# Patient Record
Sex: FEMALE | Race: WHITE | Employment: OTHER | URBAN - METROPOLITAN AREA
[De-identification: names, ages, dates, MRNs, and addresses within clinical notes are randomized per-mention and may not be internally consistent; named-entity substitution may affect disease eponyms.]

---

## 2017-02-22 DIAGNOSIS — K21.9 GASTROESOPHAGEAL REFLUX DISEASE WITHOUT ESOPHAGITIS: Chronic | ICD-10-CM

## 2017-02-23 RX ORDER — PANTOPRAZOLE SODIUM 40 MG/1
40 TABLET, DELAYED RELEASE ORAL DAILY
Qty: 90 TABLET | Refills: 1 | Status: SHIPPED | OUTPATIENT
Start: 2017-02-23 | End: 2017-06-15 | Stop reason: SDUPTHER

## 2017-06-05 ENCOUNTER — OFFICE VISIT (OUTPATIENT)
Dept: SLEEP MEDICINE | Age: 82
End: 2017-06-05

## 2017-06-05 VITALS
OXYGEN SATURATION: 97 % | BODY MASS INDEX: 35.82 KG/M2 | DIASTOLIC BLOOD PRESSURE: 90 MMHG | SYSTOLIC BLOOD PRESSURE: 150 MMHG | HEIGHT: 65 IN | HEART RATE: 97 BPM | WEIGHT: 215 LBS

## 2017-06-05 DIAGNOSIS — G47.33 OBSTRUCTIVE SLEEP APNEA (ADULT) (PEDIATRIC): Primary | Chronic | ICD-10-CM

## 2017-06-05 DIAGNOSIS — E66.9 NON MORBID OBESITY, UNSPECIFIED OBESITY TYPE: Chronic | ICD-10-CM

## 2017-06-05 DIAGNOSIS — I10 HYPERTENSION, ESSENTIAL: Chronic | ICD-10-CM

## 2017-06-05 DIAGNOSIS — K21.9 GASTROESOPHAGEAL REFLUX DISEASE WITHOUT ESOPHAGITIS: Chronic | ICD-10-CM

## 2017-06-05 PROCEDURE — 99214 OFFICE O/P EST MOD 30 MIN: CPT | Performed by: NURSE PRACTITIONER

## 2017-06-05 RX ORDER — MODAFINIL 200 MG/1
200 TABLET ORAL EVERY MORNING
Qty: 90 TABLET | Refills: 1 | Status: SHIPPED | OUTPATIENT
Start: 2017-06-05 | End: 2018-12-04 | Stop reason: SDUPTHER

## 2017-06-05 ASSESSMENT — ENCOUNTER SYMPTOMS
RHINORRHEA: 0
APNEA: 0
SHORTNESS OF BREATH: 0
COUGH: 0
ABDOMINAL PAIN: 0
ABDOMINAL DISTENTION: 0
SINUS PRESSURE: 0

## 2017-06-05 ASSESSMENT — SLEEP AND FATIGUE QUESTIONNAIRES
HOW LIKELY ARE YOU TO NOD OFF OR FALL ASLEEP WHILE SITTING AND READING: 1
HOW LIKELY ARE YOU TO NOD OFF OR FALL ASLEEP WHILE WATCHING TV: 1
HOW LIKELY ARE YOU TO NOD OFF OR FALL ASLEEP WHILE SITTING INACTIVE IN A PUBLIC PLACE: 0
HOW LIKELY ARE YOU TO NOD OFF OR FALL ASLEEP WHILE SITTING QUIETLY AFTER LUNCH WITHOUT ALCOHOL: 0
HOW LIKELY ARE YOU TO NOD OFF OR FALL ASLEEP WHILE LYING DOWN TO REST IN THE AFTERNOON WHEN CIRCUMSTANCES PERMIT: 0
HOW LIKELY ARE YOU TO NOD OFF OR FALL ASLEEP WHEN YOU ARE A PASSENGER IN A CAR FOR AN HOUR WITHOUT A BREAK: 0
ESS TOTAL SCORE: 2
HOW LIKELY ARE YOU TO NOD OFF OR FALL ASLEEP WHILE SITTING AND TALKING TO SOMEONE: 0
HOW LIKELY ARE YOU TO NOD OFF OR FALL ASLEEP IN A CAR, WHILE STOPPED FOR A FEW MINUTES IN TRAFFIC: 0

## 2017-06-15 ENCOUNTER — OFFICE VISIT (OUTPATIENT)
Dept: FAMILY MEDICINE CLINIC | Age: 82
End: 2017-06-15

## 2017-06-15 VITALS
BODY MASS INDEX: 36.44 KG/M2 | WEIGHT: 219 LBS | HEART RATE: 68 BPM | DIASTOLIC BLOOD PRESSURE: 78 MMHG | SYSTOLIC BLOOD PRESSURE: 128 MMHG

## 2017-06-15 DIAGNOSIS — E78.2 MIXED HYPERLIPIDEMIA: Chronic | ICD-10-CM

## 2017-06-15 DIAGNOSIS — G47.33 OBSTRUCTIVE SLEEP APNEA: Chronic | ICD-10-CM

## 2017-06-15 DIAGNOSIS — K21.9 GASTROESOPHAGEAL REFLUX DISEASE WITHOUT ESOPHAGITIS: Chronic | ICD-10-CM

## 2017-06-15 DIAGNOSIS — M19.90 ARTHRITIS: ICD-10-CM

## 2017-06-15 DIAGNOSIS — I10 ESSENTIAL HYPERTENSION: Primary | Chronic | ICD-10-CM

## 2017-06-15 PROCEDURE — 99214 OFFICE O/P EST MOD 30 MIN: CPT | Performed by: FAMILY MEDICINE

## 2017-06-15 RX ORDER — PRAVASTATIN SODIUM 20 MG
20 TABLET ORAL EVERY EVENING
Qty: 90 TABLET | Refills: 1 | Status: SHIPPED | OUTPATIENT
Start: 2017-06-15 | End: 2017-12-04 | Stop reason: SDUPTHER

## 2017-06-15 RX ORDER — CELECOXIB 200 MG/1
200 CAPSULE ORAL DAILY
Qty: 90 CAPSULE | Refills: 1 | Status: SHIPPED | OUTPATIENT
Start: 2017-06-15 | End: 2017-12-04 | Stop reason: SDUPTHER

## 2017-06-15 RX ORDER — PANTOPRAZOLE SODIUM 40 MG/1
40 TABLET, DELAYED RELEASE ORAL DAILY
Qty: 90 TABLET | Refills: 1 | Status: SHIPPED | OUTPATIENT
Start: 2017-06-15 | End: 2017-12-04 | Stop reason: SDUPTHER

## 2017-06-15 RX ORDER — LANOLIN ALCOHOL/MO/W.PET/CERES
1000 CREAM (GRAM) TOPICAL DAILY
COMMUNITY
End: 2019-12-09

## 2017-06-15 RX ORDER — AMLODIPINE BESYLATE AND BENAZEPRIL HYDROCHLORIDE 5; 40 MG/1; MG/1
1 CAPSULE ORAL DAILY
Qty: 90 CAPSULE | Refills: 1 | Status: SHIPPED | OUTPATIENT
Start: 2017-06-15 | End: 2017-12-04 | Stop reason: SDUPTHER

## 2017-07-18 ENCOUNTER — OFFICE VISIT (OUTPATIENT)
Dept: FAMILY MEDICINE CLINIC | Age: 82
End: 2017-07-18

## 2017-07-18 VITALS
SYSTOLIC BLOOD PRESSURE: 128 MMHG | WEIGHT: 223 LBS | HEART RATE: 76 BPM | DIASTOLIC BLOOD PRESSURE: 70 MMHG | BODY MASS INDEX: 37.11 KG/M2

## 2017-07-18 DIAGNOSIS — R30.0 DYSURIA: Primary | ICD-10-CM

## 2017-07-18 DIAGNOSIS — M25.562 ACUTE PAIN OF LEFT KNEE: ICD-10-CM

## 2017-07-18 DIAGNOSIS — M54.6 CHRONIC LEFT-SIDED THORACIC BACK PAIN: ICD-10-CM

## 2017-07-18 DIAGNOSIS — G89.29 CHRONIC LEFT-SIDED THORACIC BACK PAIN: ICD-10-CM

## 2017-07-18 LAB
BILIRUBIN, POC: NORMAL
BLOOD URINE, POC: NORMAL
CLARITY, POC: CLEAR
COLOR, POC: YELLOW
GLUCOSE URINE, POC: NORMAL
KETONES, POC: NORMAL
LEUKOCYTE EST, POC: NORMAL
NITRITE, POC: NORMAL
PH, POC: 5.5
PROTEIN, POC: NORMAL
SPECIFIC GRAVITY, POC: 1.02
UROBILINOGEN, POC: 0.2

## 2017-07-18 PROCEDURE — 81002 URINALYSIS NONAUTO W/O SCOPE: CPT | Performed by: FAMILY MEDICINE

## 2017-07-18 PROCEDURE — 99214 OFFICE O/P EST MOD 30 MIN: CPT | Performed by: FAMILY MEDICINE

## 2017-07-18 RX ORDER — METAXALONE 800 MG/1
800 TABLET ORAL 3 TIMES DAILY
Qty: 30 TABLET | Refills: 0 | Status: SHIPPED | OUTPATIENT
Start: 2017-07-18 | End: 2017-07-28

## 2017-07-18 RX ORDER — CHOLECALCIFEROL (VITAMIN D3) 1250 MCG
CAPSULE ORAL
COMMUNITY
End: 2020-06-08

## 2017-07-18 RX ORDER — DULOXETIN HYDROCHLORIDE 60 MG/1
60 CAPSULE, DELAYED RELEASE ORAL DAILY
Qty: 90 CAPSULE | Refills: 1 | Status: SHIPPED | OUTPATIENT
Start: 2017-07-18 | End: 2017-12-04 | Stop reason: SDUPTHER

## 2017-07-18 RX ORDER — SULFAMETHOXAZOLE AND TRIMETHOPRIM 800; 160 MG/1; MG/1
1 TABLET ORAL 2 TIMES DAILY
Qty: 14 TABLET | Refills: 0 | Status: SHIPPED | OUTPATIENT
Start: 2017-07-18 | End: 2017-07-25

## 2017-07-18 RX ORDER — MELOXICAM 15 MG/1
15 TABLET ORAL DAILY
Qty: 30 TABLET | Refills: 3 | Status: SHIPPED | OUTPATIENT
Start: 2017-07-18 | End: 2020-04-21 | Stop reason: ALTCHOICE

## 2017-07-21 LAB
ORGANISM: ABNORMAL
URINE CULTURE, ROUTINE: ABNORMAL

## 2017-07-21 RX ORDER — NITROFURANTOIN 25; 75 MG/1; MG/1
100 CAPSULE ORAL 2 TIMES DAILY
Qty: 14 CAPSULE | Refills: 0 | Status: SHIPPED | OUTPATIENT
Start: 2017-07-21 | End: 2017-07-28

## 2017-07-23 ASSESSMENT — ENCOUNTER SYMPTOMS
BACK PAIN: 1
EYES NEGATIVE: 1
RESPIRATORY NEGATIVE: 1

## 2017-12-04 ENCOUNTER — OFFICE VISIT (OUTPATIENT)
Dept: FAMILY MEDICINE CLINIC | Age: 82
End: 2017-12-04

## 2017-12-04 VITALS
SYSTOLIC BLOOD PRESSURE: 122 MMHG | DIASTOLIC BLOOD PRESSURE: 80 MMHG | BODY MASS INDEX: 37.28 KG/M2 | WEIGHT: 224 LBS | HEART RATE: 62 BPM

## 2017-12-04 DIAGNOSIS — M19.90 ARTHRITIS: ICD-10-CM

## 2017-12-04 DIAGNOSIS — R53.83 FATIGUE, UNSPECIFIED TYPE: ICD-10-CM

## 2017-12-04 DIAGNOSIS — M25.512 CHRONIC LEFT SHOULDER PAIN: ICD-10-CM

## 2017-12-04 DIAGNOSIS — I10 HYPERTENSION, UNSPECIFIED TYPE: ICD-10-CM

## 2017-12-04 DIAGNOSIS — G89.29 CHRONIC BILATERAL LOW BACK PAIN WITHOUT SCIATICA: ICD-10-CM

## 2017-12-04 DIAGNOSIS — R53.83 FATIGUE, UNSPECIFIED TYPE: Primary | ICD-10-CM

## 2017-12-04 DIAGNOSIS — M54.50 CHRONIC BILATERAL LOW BACK PAIN WITHOUT SCIATICA: ICD-10-CM

## 2017-12-04 DIAGNOSIS — G89.29 CHRONIC LEFT SHOULDER PAIN: ICD-10-CM

## 2017-12-04 DIAGNOSIS — K21.9 GASTROESOPHAGEAL REFLUX DISEASE WITHOUT ESOPHAGITIS: Chronic | ICD-10-CM

## 2017-12-04 LAB
ALBUMIN SERPL-MCNC: 4.4 G/DL (ref 3.4–5)
ALP BLD-CCNC: 62 U/L (ref 40–129)
ALT SERPL-CCNC: 22 U/L (ref 10–40)
ANION GAP SERPL CALCULATED.3IONS-SCNC: 15 MMOL/L (ref 3–16)
AST SERPL-CCNC: 22 U/L (ref 15–37)
BILIRUB SERPL-MCNC: 0.6 MG/DL (ref 0–1)
BILIRUBIN DIRECT: <0.2 MG/DL (ref 0–0.3)
BILIRUBIN, INDIRECT: NORMAL MG/DL (ref 0–1)
BUN BLDV-MCNC: 16 MG/DL (ref 7–20)
CALCIUM SERPL-MCNC: 9.7 MG/DL (ref 8.3–10.6)
CHLORIDE BLD-SCNC: 102 MMOL/L (ref 99–110)
CHOLESTEROL, TOTAL: 159 MG/DL (ref 0–199)
CO2: 26 MMOL/L (ref 21–32)
CREAT SERPL-MCNC: 0.7 MG/DL (ref 0.6–1.2)
GFR AFRICAN AMERICAN: >60
GFR NON-AFRICAN AMERICAN: >60
GLUCOSE BLD-MCNC: 118 MG/DL (ref 70–99)
HCT VFR BLD CALC: 41.6 % (ref 36–48)
HDLC SERPL-MCNC: 54 MG/DL (ref 40–60)
HEMOGLOBIN: 13.7 G/DL (ref 12–16)
LDL CHOLESTEROL CALCULATED: 75 MG/DL
MCH RBC QN AUTO: 31.6 PG (ref 26–34)
MCHC RBC AUTO-ENTMCNC: 32.9 G/DL (ref 31–36)
MCV RBC AUTO: 96 FL (ref 80–100)
PDW BLD-RTO: 14.8 % (ref 12.4–15.4)
PLATELET # BLD: 218 K/UL (ref 135–450)
PMV BLD AUTO: 9.3 FL (ref 5–10.5)
POTASSIUM SERPL-SCNC: 5.3 MMOL/L (ref 3.5–5.1)
RBC # BLD: 4.33 M/UL (ref 4–5.2)
SODIUM BLD-SCNC: 143 MMOL/L (ref 136–145)
TOTAL PROTEIN: 6.5 G/DL (ref 6.4–8.2)
TRIGL SERPL-MCNC: 149 MG/DL (ref 0–150)
TSH SERPL DL<=0.05 MIU/L-ACNC: 1.94 UIU/ML (ref 0.27–4.2)
VLDLC SERPL CALC-MCNC: 30 MG/DL
WBC # BLD: 6.4 K/UL (ref 4–11)

## 2017-12-04 PROCEDURE — G0008 ADMIN INFLUENZA VIRUS VAC: HCPCS | Performed by: FAMILY MEDICINE

## 2017-12-04 PROCEDURE — 99214 OFFICE O/P EST MOD 30 MIN: CPT | Performed by: FAMILY MEDICINE

## 2017-12-04 PROCEDURE — 90662 IIV NO PRSV INCREASED AG IM: CPT | Performed by: FAMILY MEDICINE

## 2017-12-04 RX ORDER — PANTOPRAZOLE SODIUM 40 MG/1
40 TABLET, DELAYED RELEASE ORAL DAILY
Qty: 90 TABLET | Refills: 1 | Status: SHIPPED | OUTPATIENT
Start: 2017-12-04 | End: 2018-07-19 | Stop reason: SDUPTHER

## 2017-12-04 RX ORDER — MODAFINIL 200 MG/1
TABLET ORAL
COMMUNITY
Start: 2017-11-14 | End: 2018-12-04 | Stop reason: CLARIF

## 2017-12-04 RX ORDER — PRAVASTATIN SODIUM 20 MG
20 TABLET ORAL EVERY EVENING
Qty: 90 TABLET | Refills: 1 | Status: SHIPPED | OUTPATIENT
Start: 2017-12-04 | End: 2018-07-20 | Stop reason: SDUPTHER

## 2017-12-04 RX ORDER — DULOXETIN HYDROCHLORIDE 60 MG/1
60 CAPSULE, DELAYED RELEASE ORAL DAILY
Qty: 90 CAPSULE | Refills: 1 | Status: SHIPPED | OUTPATIENT
Start: 2017-12-04 | End: 2018-07-19 | Stop reason: SDUPTHER

## 2017-12-04 RX ORDER — AMLODIPINE BESYLATE AND BENAZEPRIL HYDROCHLORIDE 5; 40 MG/1; MG/1
1 CAPSULE ORAL DAILY
Qty: 90 CAPSULE | Refills: 1 | Status: SHIPPED | OUTPATIENT
Start: 2017-12-04 | End: 2018-07-19 | Stop reason: SDUPTHER

## 2017-12-04 RX ORDER — CELECOXIB 200 MG/1
200 CAPSULE ORAL DAILY
Qty: 90 CAPSULE | Refills: 1 | Status: SHIPPED | OUTPATIENT
Start: 2017-12-04 | End: 2018-09-17 | Stop reason: SDUPTHER

## 2017-12-04 RX ORDER — INDAPAMIDE 1.25 MG/1
1.25 TABLET, FILM COATED ORAL EVERY MORNING
Qty: 30 TABLET | Refills: 2 | Status: SHIPPED | OUTPATIENT
Start: 2017-12-04 | End: 2018-01-23 | Stop reason: SDUPTHER

## 2017-12-04 RX ORDER — ERGOCALCIFEROL 1.25 MG/1
CAPSULE ORAL
COMMUNITY
Start: 2017-11-17 | End: 2020-06-08

## 2017-12-04 RX ORDER — HYDROCODONE BITARTRATE AND ACETAMINOPHEN 5; 325 MG/1; MG/1
TABLET ORAL
COMMUNITY
Start: 2017-11-29 | End: 2018-03-19

## 2017-12-04 NOTE — PROGRESS NOTES
Vaccine Information Sheet, \"Influenza - Inactivated\"  given to Be Wells, or parent/legal guardian of  Be Wells and verbalized understanding. Patient responses:    Have you ever had a reaction to a flu vaccine? No  Are you able to eat eggs without adverse effects? Yes  Do you have any current illness? No  Have you ever had [place] Syndrome? No    Flu vaccine given per order. Please see immunization tab.
malaise. Patient describes the following psychologic symptoms: none. Patient denies fever, significant change in weight, exercise intolerance, unusual rashes, cold intolerance, constipation and change in hair texture. and GI blood loss. Symptoms have stabilized. Severity has been mild. Previous visits for this problem: yes, last seen several months ago by me. Review of Systems   Constitutional:. Negative for fever, activity change and appetite change. HENT: Negative. Eyes: Negative. Respiratory: Negative. Cardiovascular: Negative. Gastrointestinal: Negative. Objective:   Physical Exam   Constitutional: She is oriented to person, place, and time. HENT:   Mouth/Throat: Oropharynx is clear and moist.   Eyes: Conjunctivae are normal. No scleral icterus. Neck: No JVD present. No thyromegaly present. Cardiovascular: Normal rate, regular rhythm, normal heart sounds and intact distal pulses. Exam reveals no gallop and no friction rub. No murmur heard. Pulmonary/Chest: Effort normal and breath sounds normal. She has no wheezes. She has no rales. Abdominal: Soft. Bowel sounds are normal. She exhibits no distension and no mass. There is no hepatosplenomegaly. There is no tenderness. No hernia. Musculoskeletal: She exhibits no edema. Lymphadenopathy:     She has no cervical adenopathy. Neurological: She is alert and oriented to person, place, and time. Skin: No rash noted. Psychiatric: She has a normal mood and affect. Vitals reviewed. Assessment:        1. Fatigue, unspecified type  TSH without Reflex    Basic Metabolic Panel    Hepatic Function Panel   2. Arthritis  amLODIPine-benazepril (LOTREL) 5-40 MG per capsule    celecoxib (CELEBREX) 200 MG capsule    CBC   3. Gastroesophageal reflux disease without esophagitis  pantoprazole (PROTONIX) 40 MG tablet   4. Hypertension, unspecified type  CBC    Lipid Panel   5.  Chronic left shoulder pain  External Referral To Physical

## 2017-12-05 ENCOUNTER — OFFICE VISIT (OUTPATIENT)
Dept: SLEEP MEDICINE | Age: 82
End: 2017-12-05

## 2017-12-05 VITALS
OXYGEN SATURATION: 93 % | BODY MASS INDEX: 37.32 KG/M2 | SYSTOLIC BLOOD PRESSURE: 138 MMHG | DIASTOLIC BLOOD PRESSURE: 79 MMHG | HEIGHT: 65 IN | HEART RATE: 76 BPM | WEIGHT: 224 LBS

## 2017-12-05 DIAGNOSIS — I10 HYPERTENSION, ESSENTIAL: Chronic | ICD-10-CM

## 2017-12-05 DIAGNOSIS — K21.9 GASTROESOPHAGEAL REFLUX DISEASE WITHOUT ESOPHAGITIS: Chronic | ICD-10-CM

## 2017-12-05 DIAGNOSIS — G47.33 OBSTRUCTIVE SLEEP APNEA SYNDROME: Primary | Chronic | ICD-10-CM

## 2017-12-05 DIAGNOSIS — E66.9 NON MORBID OBESITY, UNSPECIFIED OBESITY TYPE: Chronic | ICD-10-CM

## 2017-12-05 PROCEDURE — 99214 OFFICE O/P EST MOD 30 MIN: CPT | Performed by: NURSE PRACTITIONER

## 2017-12-05 RX ORDER — FLUTICASONE PROPIONATE 50 MCG
2 SPRAY, SUSPENSION (ML) NASAL DAILY
Qty: 3 BOTTLE | Refills: 3 | Status: SHIPPED | OUTPATIENT
Start: 2017-12-05 | End: 2019-08-12 | Stop reason: SDUPTHER

## 2017-12-05 ASSESSMENT — ENCOUNTER SYMPTOMS
ABDOMINAL DISTENTION: 0
SINUS PRESSURE: 0
APNEA: 0
COUGH: 0
SHORTNESS OF BREATH: 0
ABDOMINAL PAIN: 0
RHINORRHEA: 0

## 2017-12-05 ASSESSMENT — SLEEP AND FATIGUE QUESTIONNAIRES
HOW LIKELY ARE YOU TO NOD OFF OR FALL ASLEEP WHILE LYING DOWN TO REST IN THE AFTERNOON WHEN CIRCUMSTANCES PERMIT: 0
HOW LIKELY ARE YOU TO NOD OFF OR FALL ASLEEP WHEN YOU ARE A PASSENGER IN A CAR FOR AN HOUR WITHOUT A BREAK: 2
HOW LIKELY ARE YOU TO NOD OFF OR FALL ASLEEP IN A CAR, WHILE STOPPED FOR A FEW MINUTES IN TRAFFIC: 0
HOW LIKELY ARE YOU TO NOD OFF OR FALL ASLEEP WHILE SITTING AND READING: 1
HOW LIKELY ARE YOU TO NOD OFF OR FALL ASLEEP WHILE WATCHING TV: 1
HOW LIKELY ARE YOU TO NOD OFF OR FALL ASLEEP WHILE SITTING AND TALKING TO SOMEONE: 0
HOW LIKELY ARE YOU TO NOD OFF OR FALL ASLEEP WHILE SITTING INACTIVE IN A PUBLIC PLACE: 0
HOW LIKELY ARE YOU TO NOD OFF OR FALL ASLEEP WHILE SITTING QUIETLY AFTER LUNCH WITHOUT ALCOHOL: 1
ESS TOTAL SCORE: 5

## 2017-12-05 NOTE — PROGRESS NOTES
pantoprazole (PROTONIX) 40 MG tablet Take 1 tablet by mouth daily 12/4/17  Yes Archana Robin MD   pravastatin (PRAVACHOL) 20 MG tablet Take 1 tablet by mouth every evening 12/4/17  Yes Archana Robin MD   celecoxib (CELEBREX) 200 MG capsule Take 1 capsule by mouth daily 12/4/17  Yes Archana Robin MD   vitamin B-12 (CYANOCOBALAMIN) 1000 MCG tablet Take 1,000 mcg by mouth daily   Yes Historical Provider, MD   fluticasone (FLONASE) 50 MCG/ACT nasal spray 2 sprays by Nasal route daily 12/2/16  Yes Stacie Banerjee MD   meclizine (ANTIVERT) 25 MG tablet Take 25 mg by mouth 2 times daily. Yes Historical Provider, MD   vitamin E 1000 UNITS capsule Take 1,000 Units by mouth daily. Yes Historical Provider, MD   Methylcellulose, Laxative, (CITRUCEL) 500 MG TABS Take 1,000 mg by mouth daily. Yes Historical Provider, MD   Pyridoxine HCl (VITAMIN B-6) 100 MG tablet Take 100 mg by mouth daily. Yes Historical Provider, MD   HYDROcodone-acetaminophen [facility]) 5-325 MG per tablet  11/29/17   Historical Provider, MD   indapamide (LOZOL) 1.25 MG tablet Take 1 tablet by mouth every morning 12/4/17   Archana Robin MD   Cholecalciferol (VITAMIN D3) 05522 units CAPS Take by mouth    Historical Provider, MD   meloxicam (MOBIC) 15 MG tablet Take 1 tablet by mouth daily 7/18/17   Archana Robin MD   Ascorbic Acid (VITAMIN C) 500 MG tablet Take 500 mg by mouth daily.     Historical Provider, MD       Allergies as of 12/05/2017 - Review Complete 12/05/2017   Allergen Reaction Noted    Lodine [etodolac] Rash 07/07/2010       Patient Active Problem List   Diagnosis    Malignant neoplasm of breast ([address]    Hyperlipidemia    Hypertension    Hypercholesterolemia    Gastroesophageal reflux disease    Rectocele    Arthritis    Sleep apnea    Fatigue    Spinal stenosis of lumbar region    Numbness and tingling of both legs    GERD (gastroesophageal reflux disease)    Neuropathy (HCC)    B12 deficiency    Spinal stenosis    Elevated LFTs  Breast cancer in situ    Allergic rhinitis    Myxoma    Wound check, abscess    Depression screening    Obstructive sleep apnea       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 REMOVAL  1/08    bilateral    COLONOSCOPY  2005    30, R7X 7X 2005    CYSTOCELE REPAIR  1989    DILATION AND CURETTAGE OF UTERUS  yrs ago    DOPPLER ECHOCARDIOGRAPHY  2007    EYE SURGERY      bilat catarats    HYSTERECTOMY  4/1974    JOINT REPLACEMENT      left hip   [facility]    MOHS SURGERY  2/08    nose    OTHER SURGICAL HISTORY  12/22/06    epidural    [facility]    SUTURE REMOVAL  june 2004    sutures in 3 fingers    TONSILLECTOMY      TOTAL HIP ARTHROPLASTY  2006    L       Family History   Problem Relation Age of Onset    Cancer Brother      Prostate    Cancer Mother      Stomach Cancer at age 80    Stroke Father     Cancer Paternal Grandmother      intraadbominal at age 80    Cancer Other      Leukemia age 70       Vitals:  Weight BMI   Wt Readings from Last 3 Encounters:   12/05/17 224 lb (101.6 kg)   12/04/17 224 lb (101.6 kg)   07/18/17 223 lb (101.2 kg)    Body mass index is 37.28 kg/m².      BP HR SaO2   BP Readings from Last 3

## 2017-12-21 ENCOUNTER — TELEPHONE (OUTPATIENT)
Dept: FAMILY MEDICINE CLINIC | Age: 82
End: 2017-12-21

## 2017-12-21 NOTE — TELEPHONE ENCOUNTER
What does the note needs to say?, I have told the pt may need PT assessment so the insurance can cover some of the cost

## 2017-12-21 NOTE — TELEPHONE ENCOUNTER
Pt is calling about getting a stair lift. She says she is having them come out on 12/28/17 to install it. She needs a note from her doctor. See [employer] e-mail from 12/18/17.     Last office visit 12/5/17

## 2017-12-22 NOTE — TELEPHONE ENCOUNTER
Left patient a message informing her of the prescription being completed. Just need to find out if she wants us to fax it or if she is coming in the office and picking it up.

## 2018-01-23 ENCOUNTER — OFFICE VISIT (OUTPATIENT)
Dept: FAMILY MEDICINE CLINIC | Age: 83
End: 2018-01-23

## 2018-01-23 VITALS
RESPIRATION RATE: 16 BRPM | HEIGHT: 65 IN | BODY MASS INDEX: 37.72 KG/M2 | OXYGEN SATURATION: 96 % | SYSTOLIC BLOOD PRESSURE: 154 MMHG | HEART RATE: 73 BPM | DIASTOLIC BLOOD PRESSURE: 80 MMHG | WEIGHT: 226.4 LBS

## 2018-01-23 DIAGNOSIS — R82.998 LEUKOCYTES IN URINE: ICD-10-CM

## 2018-01-23 DIAGNOSIS — R53.83 FATIGUE, UNSPECIFIED TYPE: ICD-10-CM

## 2018-01-23 DIAGNOSIS — I10 ESSENTIAL HYPERTENSION: Primary | Chronic | ICD-10-CM

## 2018-01-23 DIAGNOSIS — R06.02 SOB (SHORTNESS OF BREATH): ICD-10-CM

## 2018-01-23 LAB
BILIRUBIN, POC: ABNORMAL
BLOOD URINE, POC: ABNORMAL
CLARITY, POC: CLEAR
COLOR, POC: YELLOW
GLUCOSE URINE, POC: ABNORMAL
KETONES, POC: ABNORMAL
LEUKOCYTE EST, POC: ABNORMAL
NITRITE, POC: ABNORMAL
PH, POC: 6.5
PROTEIN, POC: ABNORMAL
SPECIFIC GRAVITY, POC: 1.01
UROBILINOGEN, POC: 0.2

## 2018-01-23 PROCEDURE — 81002 URINALYSIS NONAUTO W/O SCOPE: CPT | Performed by: FAMILY MEDICINE

## 2018-01-23 PROCEDURE — 99214 OFFICE O/P EST MOD 30 MIN: CPT | Performed by: FAMILY MEDICINE

## 2018-01-23 RX ORDER — ERGOCALCIFEROL (VITAMIN D2) 1250 MCG
50000 CAPSULE ORAL
COMMUNITY
Start: 2018-01-08 | End: 2021-05-03 | Stop reason: SDUPTHER

## 2018-01-23 RX ORDER — INDAPAMIDE 1.25 MG/1
1.25 TABLET, FILM COATED ORAL EVERY MORNING
Qty: 90 TABLET | Refills: 1 | Status: SHIPPED | OUTPATIENT
Start: 2018-01-23 | End: 2018-07-19 | Stop reason: SDUPTHER

## 2018-01-23 ASSESSMENT — PATIENT HEALTH QUESTIONNAIRE - PHQ9
SUM OF ALL RESPONSES TO PHQ9 QUESTIONS 1 & 2: 0
1. LITTLE INTEREST OR PLEASURE IN DOING THINGS: 0
SUM OF ALL RESPONSES TO PHQ QUESTIONS 1-9: 0
2. FEELING DOWN, DEPRESSED OR HOPELESS: 0

## 2018-01-23 NOTE — PROGRESS NOTES
intraadbominal at age 80    Cancer Other      Leukemia age 70     Social History     Social History    Marital status:      Spouse name: Andrew Mendez Number of children: 2    Years of education: N/A     Social History Main Topics    Smoking status: Never Smoker    Smokeless tobacco: Never Used    Alcohol use Yes      Comment: very rare occasions    Drug use: No    Sexual activity: Yes     Partners: Male     Other Topics Concern    None     Social History Narrative    None     Current Outpatient Prescriptions   Medication Sig Dispense Refill    ergocalciferol (ERGOCALCIFEROL) 05455 units capsule Take 50,000 Units by mouth      indapamide (LOZOL) 1.25 MG tablet Take 1 tablet by mouth every morning 90 tablet 1    Calcium Polycarbophil (FIBER-CAPS PO) Take by mouth      Multiple Vitamins-Minerals (VISION VITAMINS PO) Take by mouth      fluticasone (FLONASE) 50 MCG/ACT nasal spray 2 sprays by Nasal route daily 3 Bottle 3    vitamin D (ERGOCALCIFEROL) 31663 units CAPS capsule       HYDROcodone-acetaminophen (NORCO) 5-325 MG per tablet       modafinil (PROVIGIL) 200 MG tablet       DULoxetine (CYMBALTA) 60 MG extended release capsule Take 1 capsule by mouth daily 90 capsule 1    amLODIPine-benazepril (LOTREL) 5-40 MG per capsule Take 1 capsule by mouth daily 90 capsule 1    metoprolol tartrate (LOPRESSOR) 25 MG tablet Take 1 tablet by mouth 2 times daily 180 tablet 1    pantoprazole (PROTONIX) 40 MG tablet Take 1 tablet by mouth daily 90 tablet 1    pravastatin (PRAVACHOL) 20 MG tablet Take 1 tablet by mouth every evening 90 tablet 1    celecoxib (CELEBREX) 200 MG capsule Take 1 capsule by mouth daily 90 capsule 1    Cholecalciferol (VITAMIN D3) 98717 units CAPS Take by mouth      meloxicam (MOBIC) 15 MG tablet Take 1 tablet by mouth daily 30 tablet 3    vitamin B-12 (CYANOCOBALAMIN) 1000 MCG tablet Take 1,000 mcg by mouth daily      meclizine (ANTIVERT) 25 MG tablet Take 25 mg by mouth 2 times daily.  vitamin E 1000 UNITS capsule Take 1,000 Units by mouth daily.  Methylcellulose, Laxative, (CITRUCEL) 500 MG TABS Take 1,000 mg by mouth daily.  Ascorbic Acid (VITAMIN C) 500 MG tablet Take 500 mg by mouth daily.  Pyridoxine HCl (VITAMIN B-6) 100 MG tablet Take 100 mg by mouth daily.  nitrofurantoin, macrocrystal-monohydrate, (MACROBID) 100 MG capsule Take 1 capsule by mouth 2 times daily for 5 days 10 capsule 0    phenazopyridine (PYRIDIUM) 200 MG tablet Take 1 tablet by mouth 3 times daily as needed for Pain 9 tablet 0     No current facility-administered medications for this visit. Allergies   Allergen Reactions    Lodine [Etodolac] Rash         Objective:   Physical Exam   Constitutional: She is oriented to person, place, and time. HENT:   Mouth/Throat: Oropharynx is clear and moist.   Eyes: Conjunctivae are normal. No scleral icterus. Neck: No JVD present. No thyromegaly present. Cardiovascular: Normal rate, regular rhythm, normal heart sounds and intact distal pulses. Exam reveals no gallop and no friction rub. No murmur heard. Pulmonary/Chest: Effort normal and breath sounds normal. She has no wheezes. She has no rales. Abdominal: Soft. Bowel sounds are normal. She exhibits no distension and no mass. There is no hepatosplenomegaly. There is no tenderness. No hernia. Musculoskeletal: She exhibits no edema. Lymphadenopathy:     She has no cervical adenopathy. Neurological: She is alert and oriented to person, place, and time. Skin: No rash noted. Psychiatric: She has a normal mood and affect. Vitals reviewed. Assessment:        1. Essential hypertension  POCT Urinalysis no Micro   2. Fatigue, unspecified type  ECHO 2D WO Color Doppler Complete    POCT Urinalysis no Micro   3. SOB (shortness of breath)  ECHO 2D WO Color Doppler Complete   4.  Leukocytes in urine  URINE CULTURE                Plan:      See orders  Close follow up

## 2018-01-24 RX ORDER — NITROFURANTOIN 25; 75 MG/1; MG/1
100 CAPSULE ORAL 2 TIMES DAILY
Qty: 10 CAPSULE | Refills: 0 | Status: SHIPPED | OUTPATIENT
Start: 2018-01-24 | End: 2020-05-25 | Stop reason: SDUPTHER

## 2018-01-24 RX ORDER — PHENAZOPYRIDINE HYDROCHLORIDE 200 MG/1
200 TABLET, FILM COATED ORAL 3 TIMES DAILY PRN
Qty: 9 TABLET | Refills: 0 | Status: SHIPPED | OUTPATIENT
Start: 2018-01-24 | End: 2019-01-24

## 2018-01-25 LAB
ORGANISM: ABNORMAL
URINE CULTURE, ROUTINE: ABNORMAL
URINE CULTURE, ROUTINE: ABNORMAL

## 2018-02-13 ENCOUNTER — OFFICE VISIT (OUTPATIENT)
Dept: SLEEP MEDICINE | Age: 83
End: 2018-02-13

## 2018-02-13 VITALS
OXYGEN SATURATION: 95 % | SYSTOLIC BLOOD PRESSURE: 138 MMHG | HEIGHT: 66 IN | HEART RATE: 112 BPM | DIASTOLIC BLOOD PRESSURE: 60 MMHG | BODY MASS INDEX: 35.36 KG/M2 | WEIGHT: 220 LBS

## 2018-02-13 DIAGNOSIS — G47.33 OBSTRUCTIVE SLEEP APNEA SYNDROME: Primary | Chronic | ICD-10-CM

## 2018-02-13 DIAGNOSIS — E66.9 NON MORBID OBESITY, UNSPECIFIED OBESITY TYPE: Chronic | ICD-10-CM

## 2018-02-13 DIAGNOSIS — K21.9 GASTROESOPHAGEAL REFLUX DISEASE WITHOUT ESOPHAGITIS: Chronic | ICD-10-CM

## 2018-02-13 DIAGNOSIS — I10 HYPERTENSION, ESSENTIAL: Chronic | ICD-10-CM

## 2018-02-13 PROCEDURE — 99214 OFFICE O/P EST MOD 30 MIN: CPT | Performed by: INTERNAL MEDICINE

## 2018-02-13 ASSESSMENT — SLEEP AND FATIGUE QUESTIONNAIRES
HOW LIKELY ARE YOU TO NOD OFF OR FALL ASLEEP WHILE SITTING AND TALKING TO SOMEONE: 0
HOW LIKELY ARE YOU TO NOD OFF OR FALL ASLEEP WHEN YOU ARE A PASSENGER IN A CAR FOR AN HOUR WITHOUT A BREAK: 0
HOW LIKELY ARE YOU TO NOD OFF OR FALL ASLEEP IN A CAR, WHILE STOPPED FOR A FEW MINUTES IN TRAFFIC: 0
HOW LIKELY ARE YOU TO NOD OFF OR FALL ASLEEP WHILE WATCHING TV: 1
HOW LIKELY ARE YOU TO NOD OFF OR FALL ASLEEP WHILE SITTING QUIETLY AFTER LUNCH WITHOUT ALCOHOL: 0
HOW LIKELY ARE YOU TO NOD OFF OR FALL ASLEEP WHILE SITTING AND READING: 1
HOW LIKELY ARE YOU TO NOD OFF OR FALL ASLEEP WHILE SITTING INACTIVE IN A PUBLIC PLACE: 0
ESS TOTAL SCORE: 2
HOW LIKELY ARE YOU TO NOD OFF OR FALL ASLEEP WHILE LYING DOWN TO REST IN THE AFTERNOON WHEN CIRCUMSTANCES PERMIT: 0

## 2018-02-13 ASSESSMENT — ENCOUNTER SYMPTOMS
RHINORRHEA: 0
SHORTNESS OF BREATH: 0
CHOKING: 0
COUGH: 0
APNEA: 0

## 2018-02-13 NOTE — LETTER
[facility]  [facility] [address] [place] [address]  Phone: [phone]  Fax: [phone]    February 13, 2018       Patient: Roosevelt Beckham   MR Number: [id number]   YOB: 1934   Date of Visit: 2/13/2018       Reagan Mata was seen for a follow up visit today. Here is my assessment and plan as well as an attached copy of her visit today:      ASSESSMENT:  Judy Mitchell was seen today for sleep apnea. Diagnoses and all orders for this visit:    Obstructive sleep apnea syndrome    Hypertension, essential    Gastroesophageal reflux disease without esophagitis    Non morbid obesity, unspecified obesity type        Plan:     Reviewed compliance download with pt. Supplies and parts as needed for her machine. These are medically necessary. Continue medications per her PCP and other physicians. Limit caffeine use after 3pm.  Encouraged her to work on weight loss through diet and exercise. The chronic medical conditions listed are directly related to the primary diagnosis listed above. The management of the primary diagnosis affects the secondary diagnosis and vice versa. Cont meds for HTN and GERD. Question possible wean of lopressor to see if adding to her fatigue if BP will allow. Despite several previous pressure changes and AHI <5 on her machine still with fatigue. 6 month f/u. If you have questions or concerns, please do not hesitate to call me. I look forward to following Judy Mitchell along with you.     Sincerely,      Efra Shannon MD    CC providers:  Jensen Martin MD  [address]   [address]  [facility] [address]  VIA [facility] [address]

## 2018-03-19 ENCOUNTER — OFFICE VISIT (OUTPATIENT)
Dept: FAMILY MEDICINE CLINIC | Age: 83
End: 2018-03-19

## 2018-03-19 VITALS
DIASTOLIC BLOOD PRESSURE: 90 MMHG | RESPIRATION RATE: 16 BRPM | BODY MASS INDEX: 37.32 KG/M2 | HEIGHT: 65 IN | WEIGHT: 224 LBS | SYSTOLIC BLOOD PRESSURE: 130 MMHG

## 2018-03-19 DIAGNOSIS — Z00.00 ROUTINE GENERAL MEDICAL EXAMINATION AT A HEALTH CARE FACILITY: Primary | ICD-10-CM

## 2018-03-19 PROCEDURE — G0439 PPPS, SUBSEQ VISIT: HCPCS | Performed by: FAMILY MEDICINE

## 2018-03-19 ASSESSMENT — LIFESTYLE VARIABLES: HOW OFTEN DO YOU HAVE A DRINK CONTAINING ALCOHOL: 0

## 2018-03-19 ASSESSMENT — PATIENT HEALTH QUESTIONNAIRE - PHQ9: SUM OF ALL RESPONSES TO PHQ QUESTIONS 1-9: 0

## 2018-03-19 ASSESSMENT — ANXIETY QUESTIONNAIRES: GAD7 TOTAL SCORE: 2

## 2018-03-19 NOTE — PATIENT INSTRUCTIONS
A serving is 1 slice of bread, 1 ounce of dry cereal, or ½ cup of cooked rice, pasta, or cooked cereal. Try to choose whole-grain products as much as possible. · Limit lean meat, poultry, and fish to 2 servings each day. A serving is 3 ounces, about the size of a deck of cards. · Eat 4 to 5 servings of nuts, seeds, and legumes (cooked dried beans, lentils, and split peas) each week. A serving is 1/3 cup of nuts, 2 tablespoons of seeds, or ½ cup of cooked beans or peas. · Limit fats and oils to 2 to 3 servings each day. A serving is 1 teaspoon of vegetable oil or 2 tablespoons of salad dressing. · Limit sweets and added sugars to 5 servings or less a week. A serving is 1 tablespoon jelly or jam, ½ cup sorbet, or 1 cup of lemonade. · Eat less than 2,300 milligrams (mg) of sodium a day. If you limit your sodium to 1,500 mg a day, you can lower your blood pressure even more. Tips for success  · Start small. Do not try to make dramatic changes to your diet all at once. You might feel that you are missing out on your favorite foods and then be more likely to not follow the plan. Make small changes, and stick with them. Once those changes become habit, add a few more changes. · Try some of the following:  ¨ Make it a goal to eat a fruit or vegetable at every meal and at snacks. This will make it easy to get the recommended amount of fruits and vegetables each day. ¨ Try yogurt topped with fruit and nuts for a snack or healthy dessert. ¨ Add lettuce, tomato, cucumber, and onion to sandwiches. ¨ Combine a ready-made pizza crust with low-fat mozzarella cheese and lots of vegetable toppings. Try using tomatoes, squash, spinach, broccoli, carrots, cauliflower, and onions. ¨ Have a variety of cut-up vegetables with a low-fat dip as an appetizer instead of chips and dip. ¨ Sprinkle sunflower seeds or chopped almonds over salads. Or try adding chopped walnuts or almonds to cooked vegetables.   ¨ Try some vegetarian tell you how much sodium is in each serving. Make sure that you look at the serving size. If you eat more than the serving size, you have eaten more sodium. · Food labels also tell you the Percent Daily Value for sodium. Choose products with low Percent Daily Values for sodium. · Be aware that sodium can come in forms other than salt, including monosodium glutamate (MSG), sodium citrate, and sodium bicarbonate (baking soda). MSG is often added to Asian food. When you eat out, you can sometimes ask for food without MSG or added salt. Buy low-sodium foods  · Buy foods that are labeled \"unsalted\" (no salt added), \"sodium-free\" (less than 5 mg of sodium per serving), or \"low-sodium\" (less than 140 mg of sodium per serving). Foods labeled \"reduced-sodium\" and \"light sodium\" may still have too much sodium. Be sure to read the label to see how much sodium you are getting. · Buy fresh vegetables, or frozen vegetables without added sauces. Buy low-sodium versions of canned vegetables, soups, and other canned goods. Prepare low-sodium meals  · Cut back on the amount of salt you use in cooking. This will help you adjust to the taste. Do not add salt after cooking. One teaspoon of salt has about 2,300 mg of sodium. · Take the salt shaker off the table. · Flavor your food with garlic, lemon juice, onion, vinegar, herbs, and spices. Do not use soy sauce, lite soy sauce, steak sauce, onion salt, garlic salt, celery salt, mustard, or ketchup on your food. · Use low-sodium salad dressings, sauces, and ketchup. Or make your own salad dressings and sauces without adding salt. · Use less salt (or none) when recipes call for it. You can often use half the salt a recipe calls for without losing flavor. Other foods such as rice, pasta, and grains do not need added salt. · Rinse canned vegetables, and cook them in fresh water. This removes some-but not all-of the salt.   · Avoid water that is naturally high in sodium or that has been treated with water softeners, which add sodium. Call your local water company to find out the sodium content of your water supply. If you buy bottled water, read the label and choose a sodium-free brand. Avoid high-sodium foods  · Avoid eating:  ¨ Smoked, cured, salted, and canned meat, fish, and poultry. ¨ Ham, goldstein, hot dogs, and luncheon meats. ¨ Regular, hard, and processed cheese and regular peanut butter. ¨ Crackers with salted tops, and other salted snack foods such as pretzels, chips, and salted popcorn. ¨ Frozen prepared meals, unless labeled low-sodium. ¨ Canned and dried soups, broths, and bouillon, unless labeled sodium-free or low-sodium. ¨ Canned vegetables, unless labeled sodium-free or low-sodium. ¨ Western Natalie fries, pizza, tacos, and other fast foods. ¨ Pickles, olives, ketchup, and other condiments, especially soy sauce, unless labeled sodium-free or low-sodium. Where can you learn more? Go to https://Digital Message Display.Boxbe. org and sign in to your Korrio account. Enter L093 in the [facility] box to learn more about \"Low Sodium Diet (2,000 Milligram): Care Instructions. \"     If you do not have an account, please click on the \"Sign Up Now\" link. Current as of: May 12, 2017  Content Version: 11.5  © 1218-8303 Healthwise, [employer]. Care instructions adapted under license by [facility] ([facility]). If you have questions about a medical condition or this instruction, always ask your healthcare professional. [place] [address] any warranty or liability for your use of this information. Patient Education        Preventing Falls: Care Instructions  Your Care Instructions    Getting around your home safely can be a challenge if you have injuries or health problems that make it easy for you to fall. Loose rugs and furniture in walkways are among the dangers for many older people who have problems walking or who have poor eyesight.  People who have conditions hands and knees, and crawl to the chair or other stable piece of furniture. 6. Put your hands on the chair. 7. Move one foot forward, and place it flat on the floor. Your other leg should be bent with the knee on the floor. 8. Rise slowly, turn your body, and sit in the chair. Stay seated for a bit and think about how you feel. Call for help. Even if you feel okay, let someone know what happened to you. You might not know that you have a serious injury. If you cannot get up  1. If you think you are injured after a fall or you cannot get up, try not to panic. 2. Call out for help. 3. If you have a phone within reach or you have an emergency call device, use it to call for help. 4. If you do not have a phone within reach, try to slide yourself toward it. If you cannot get to the phone, try to slide toward a door or window or a place where you think you can be heard. 5. Mayes or use an object to make noise so someone might hear you. 6. If you can reach something that you can use for a pillow, place it under your head. Try to stay warm by covering yourself with a blanket or clothing while you wait for help. When should you call for help? Call 911 anytime you think you may need emergency care. For example, call if:  ? · You passed out (lost consciousness). ? · You cannot get up after a fall. ? · You have severe pain. ?Call your doctor now or seek immediate medical care if:  ? · You have new or worse pain. ? · You are dizzy or lightheaded. ? · You hit your head. ? Watch closely for changes in your health, and be sure to contact your doctor if:  ? · You do not get better as expected. Where can you learn more? Go to https://Comenta TVxavier.ePod Solar. org and sign in to your [employer] account. Enter X079 in the [employer] box to learn more about \"How to Get Up Safely After a Fall: Care Instructions. \"     If you do not have an account, please click on the \"Sign Up Now\" link.   Current as the pavement may vary. · Make sure to wear the correct eyeglasses, if you need them. Reading glasses or bifocals can make it harder to see hazards that might be in your way. · If you are walking outdoors for exercise, try to:  ¨ Walk in well-lighted, well-maintained areas. These include high school or college tracks, shopping malls, and public spaces. ¨ Walk with a partner. ¨ Watch out for cracked sidewalks, curbs, changes in the height of the pavement, exposed tree roots, and debris such as fallen leaves or branches. Where can you learn more? Go to https://Salesconxpepiceweb.healthBaifendian. org and sign in to your [employer] account. Enter P532 in the [employer] box to learn more about \"Preventing Outdoor Falls: Care Instructions. \"     If you do not have an account, please click on the \"Sign Up Now\" link. Current as of: May 12, 2017  Content Version: 11.5  © 6010-7735 Healthwise, [employer]an[employer]. Care instructions adapted under license by [facility] ([facility]). If you have questions about a medical condition or this instruction, always ask your healthcare professional. [place] [address] any warranty or liability for your use of this information. Personalized Preventive Plan for Celine Carlson - 3/19/2018  Medicare offers a range of preventive health benefits. Some of the tests and screenings are paid in full while other may be subject to a deductible, co-insurance, and/or copay. Some of these benefits include a comprehensive review of your medical history including lifestyle, illnesses that may run in your family, and various assessments and screenings as appropriate. After reviewing your medical record and screening and assessments performed today your provider may have ordered immunizations, labs, imaging, and/or referrals for you. A list of these orders (if applicable) as well as your Preventive Care list are included within your After Visit Summary for your review.     Other

## 2018-03-19 NOTE — PROGRESS NOTES
SURGICAL HISTORY  12/22/06    epidural    RECTOCELE REPAIR  1989    SUTURE REMOVAL  june 2004    sutures in 3 fingers    TONSILLECTOMY      TOTAL HIP ARTHROPLASTY  2006    L     Family History   Problem Relation Age of Onset    Cancer Brother      Prostate    Cancer Mother      Stomach Cancer at age 80    Stroke Father     Cancer Paternal Grandmother      intraadbominal at age 80    Cancer Other      Leukemia age 70       CareTeam (Including outside providers/suppliers regularly involved in providing care):   Patient Care Team:  Joey Adan MD as PCP - General (Family Medicine)  Joey Adan MD as PCP - S Attributed Provider  Demetria Haney MD as Consulting Physician (Otolaryngology)  Fabi Plunkett MD as Consulting Physician (Sleep Medicine)    Wt Readings from Last 3 Encounters:   03/19/18 224 lb (101.6 kg)   02/13/18 220 lb (99.8 kg)   01/23/18 226 lb 6.4 oz (102.7 kg)     Vitals:    03/19/18 1017   Resp: 16   Weight: 224 lb (101.6 kg)   Height: 5' 4.5\" (1.638 m)       General Appearance: alert and oriented to person, place and time, well developed and well- nourished, in no acute distress  Skin: warm and dry, no rash or erythema  Head: normocephalic and atraumatic  Eyes: pupils equal, round, and reactive to light, extraocular eye movements intact, conjunctivae normal  ENT: tympanic membrane, external ear and ear canal normal bilaterally, nose without deformity, nasal mucosa and turbinates normal without polyps  Neck: supple and non-tender without mass, no thyromegaly or thyroid nodules, no cervical lymphadenopathy  Pulmonary/Chest: clear to auscultation bilaterally- no wheezes, rales or rhonchi, normal air movement, no respiratory distress  Cardiovascular: normal rate, regular rhythm, normal S1 and S2, no murmurs, rubs, clicks, or gallops, distal pulses intact, no carotid bruits  Abdomen: soft, non-tender, non-distended, normal bowel sounds, no masses or organomegaly  Extremities: no cyanosis, clubbing or edema  Musculoskeletal: normal range of motion, no joint swelling, deformity or tenderness  Neurologic: reflexes normal and symmetric, no cranial nerve deficit, gait, coordination and speech normal    Patient's complete Health Risk Assessment and screening values have been reviewed and are found in Flowsheets. The following problems were reviewed today and where indicated follow up appointments were made and/or referrals ordered. Positive Risk Factor Screenings with Interventions:             General Health:  General  In general, how would you say your health is?: Good  In the past 7 days, have you experienced any of the following?: (!) New or Increased Fatigue, New or Increased Pain  Do you get the social and emotional support that you need?: Yes  Do you have a Living Will?: Yes  General Health Risk Interventions:  · Pain issues: pt is in PT    Health Habits/Nutrition:  Health Habits/Nutrition  Do you exercise for at least 20 minutes 2-3 times per week?: Yes  Have you lost any weight without trying in the past 3 months?: No  Do you eat fewer than 2 meals per day?: No  Have you seen a dentist within the past year?: Yes  Body mass index is 37.86 kg/m².   Health Habits/Nutrition Interventions:  · Inadequate physical activity:  educational materials provided to promote increased physical activity    Hearing/Vision:  Hearing/Vision  Do you or your family notice any trouble with your hearing?: (!) Yes  Do you have difficulty driving, watching TV, or doing any of your daily activities because of your eyesight?: No  Have you had an eye exam within the past year?: Yes  Hearing/Vision Interventions:  · None indicated    Safety:  Safety  Do you have working smoke detectors?: Yes  Have all throw rugs been removed or fastened?: Yes  Do you have non-slip mats in all bathtubs?: (!) No  Do all of your stairways have a railing or banister?: Yes  Are your doorways, halls and stairs free of clutter?: Yes  Do you always fasten your seatbelt when you are in a car?: Yes  Safety Interventions:  · None indicated    ADL:  ADLs  In the past 7 days, did you need help from others to perform any of the following everyday activities?: (!) Walking/Balance  In the past 7 days, did you need help from others to take care of any of the following?: None  ADL Interventions:  · pt with PT       Personalized Preventive Plan   Current Health Maintenance Status  Immunization History   Administered Date(s) Administered    Influenza Virus Vaccine 12/20/2012    Influenza Whole 10/23/2008, 10/21/2009, 11/02/2010    Influenza, High Dose 11/01/2012, 11/01/2013, 12/04/2017    Pneumococcal 13-valent Conjugate ([id number]) 09/14/2015    Pneumococcal Conjugate 7-valent 09/14/2015    Pneumococcal Polysaccharide ([id number]) 01/01/2013    Td 07/01/2004    Zoster Live (Zostavax) 06/07/2012        Health Maintenance   Topic Date Due    DTaP/Tdap/Td vaccine (1 - Tdap) 07/02/2004    Shingles Vaccine (1 of 2 - 2 Dose Series) 08/07/2012    Potassium monitoring  12/04/2018    Creatinine monitoring  12/04/2018    DEXA (modify frequency per FRAX score)  Completed    Flu vaccine  Completed    Pneumococcal low/med risk  Completed     Recommendations for Preventive Services Due: see orders.   Pt is doing okay , continue the same   reviewed BW , discussed with the pt her life style, safety, information is given   Continue with PT at home on her own

## 2018-07-19 DIAGNOSIS — M19.90 ARTHRITIS: ICD-10-CM

## 2018-07-19 DIAGNOSIS — K21.9 GASTROESOPHAGEAL REFLUX DISEASE WITHOUT ESOPHAGITIS: Chronic | ICD-10-CM

## 2018-07-20 RX ORDER — DULOXETIN HYDROCHLORIDE 60 MG/1
CAPSULE, DELAYED RELEASE ORAL
Qty: 90 CAPSULE | Refills: 1 | Status: SHIPPED | OUTPATIENT
Start: 2018-07-20 | End: 2018-09-17 | Stop reason: SDUPTHER

## 2018-07-20 RX ORDER — INDAPAMIDE 1.25 MG/1
TABLET, FILM COATED ORAL
Qty: 90 TABLET | Refills: 1 | Status: SHIPPED | OUTPATIENT
Start: 2018-07-20 | End: 2018-09-17 | Stop reason: SDUPTHER

## 2018-07-20 RX ORDER — PANTOPRAZOLE SODIUM 40 MG/1
TABLET, DELAYED RELEASE ORAL
Qty: 90 TABLET | Refills: 1 | Status: SHIPPED | OUTPATIENT
Start: 2018-07-20 | End: 2018-09-17 | Stop reason: SDUPTHER

## 2018-07-20 RX ORDER — AMLODIPINE BESYLATE AND BENAZEPRIL HYDROCHLORIDE 5; 40 MG/1; MG/1
CAPSULE ORAL
Qty: 90 CAPSULE | Refills: 1 | Status: SHIPPED | OUTPATIENT
Start: 2018-07-20 | End: 2018-09-17 | Stop reason: SDUPTHER

## 2018-07-20 RX ORDER — PRAVASTATIN SODIUM 20 MG
TABLET ORAL
Qty: 90 TABLET | Refills: 0 | Status: SHIPPED | OUTPATIENT
Start: 2018-07-20 | End: 2018-09-19 | Stop reason: SDUPTHER

## 2018-07-20 NOTE — TELEPHONE ENCOUNTER
Medication:   Requested Prescriptions     Pending Prescriptions Disp Refills    indapamide (LOZOL) 1.25 MG tablet [Pharmacy Med Name: INDAPAMIDE TABS 1.25MG] 90 tablet 1     Sig: TAKE 1 TABLET EVERY MORNING    metoprolol tartrate (LOPRESSOR) 25 MG tablet [Pharmacy Med Name: METOPROLOL TARTRATE TABS 25MG] 180 tablet 1     Sig: TAKE 1 TABLET TWICE A DAY    amLODIPine-benazepril (LOTREL) 5-40 MG per capsule [Pharmacy Med Name: AMLODIPINE/BENAZEPRIL CAPS 5/40MG] 90 capsule 1     Sig: TAKE 1 CAPSULE DAILY    pantoprazole (PROTONIX) 40 MG tablet [Pharmacy Med Name: PANTOPRAZOLE SOD DR TABS 40MG] 90 tablet 1     Sig: TAKE 1 TABLET DAILY    DULoxetine (CYMBALTA) 60 MG extended release capsule [Pharmacy Med Name: DULOXETINE HCL DR CAPS 60MG] 90 capsule 1     Sig: TAKE 1 CAPSULE DAILY         Last appt: 3/19/2018   Next appt: 9/17/2018    Last Labs DM: No results found for: LABA1C  Last Lipid:   Lab Results   Component Value Date    CHOL 159 12/04/2017    TRIG 149 12/04/2017    HDL 54 12/04/2017    HDL 63 05/30/2012    LDLCALC 75 12/04/2017     Last PSA: No results found for: PSA  Last Thyroid:   Lab Results   Component Value Date    TSH 1.94 12/04/2017    T4FREE 1.3 08/18/2014

## 2018-09-17 ENCOUNTER — HOSPITAL ENCOUNTER (OUTPATIENT)
Dept: VASCULAR LAB | Age: 83
Discharge: HOME | End: 2018-09-17
Attending: FAMILY MEDICINE | Admitting: FAMILY MEDICINE

## 2018-09-17 ENCOUNTER — OFFICE VISIT (OUTPATIENT)
Dept: FAMILY MEDICINE CLINIC | Age: 83
End: 2018-09-17

## 2018-09-17 VITALS
BODY MASS INDEX: 36.37 KG/M2 | RESPIRATION RATE: 14 BRPM | HEART RATE: 71 BPM | SYSTOLIC BLOOD PRESSURE: 132 MMHG | OXYGEN SATURATION: 96 % | WEIGHT: 215.2 LBS | DIASTOLIC BLOOD PRESSURE: 70 MMHG

## 2018-09-17 DIAGNOSIS — M19.90 ARTHRITIS: ICD-10-CM

## 2018-09-17 DIAGNOSIS — I10 ESSENTIAL HYPERTENSION: Primary | Chronic | ICD-10-CM

## 2018-09-17 DIAGNOSIS — M79.89 OTHER SPECIFIED SOFT TISSUE DISORDERS: ICD-10-CM

## 2018-09-17 DIAGNOSIS — M25.561 PAIN AND SWELLING OF KNEE, RIGHT: ICD-10-CM

## 2018-09-17 DIAGNOSIS — M79.89 PAIN AND SWELLING OF RIGHT LOWER EXTREMITY: Primary | ICD-10-CM

## 2018-09-17 DIAGNOSIS — M79.604 PAIN AND SWELLING OF RIGHT LOWER EXTREMITY: Primary | ICD-10-CM

## 2018-09-17 DIAGNOSIS — E78.2 MIXED HYPERLIPIDEMIA: Chronic | ICD-10-CM

## 2018-09-17 DIAGNOSIS — R30.0 DYSURIA: ICD-10-CM

## 2018-09-17 DIAGNOSIS — K21.9 GASTROESOPHAGEAL REFLUX DISEASE WITHOUT ESOPHAGITIS: Chronic | ICD-10-CM

## 2018-09-17 DIAGNOSIS — M25.461 PAIN AND SWELLING OF KNEE, RIGHT: ICD-10-CM

## 2018-09-17 LAB
ALBUMIN SERPL-MCNC: 4.5 G/DL (ref 3.4–5)
ALP BLD-CCNC: 47 U/L (ref 40–129)
ALT SERPL-CCNC: 33 U/L (ref 10–40)
ANION GAP SERPL CALCULATED.3IONS-SCNC: 12 MMOL/L (ref 3–16)
AST SERPL-CCNC: 26 U/L (ref 15–37)
BILIRUB SERPL-MCNC: 0.7 MG/DL (ref 0–1)
BILIRUBIN DIRECT: <0.2 MG/DL (ref 0–0.3)
BILIRUBIN, INDIRECT: NORMAL MG/DL (ref 0–1)
BILIRUBIN, POC: ABNORMAL
BLOOD URINE, POC: ABNORMAL
BUN BLDV-MCNC: 17 MG/DL (ref 7–20)
CALCIUM SERPL-MCNC: 9.6 MG/DL (ref 8.3–10.6)
CHLORIDE BLD-SCNC: 100 MMOL/L (ref 99–110)
CHOLESTEROL, TOTAL: 161 MG/DL (ref 0–199)
CLARITY, POC: CLEAR
CO2: 28 MMOL/L (ref 21–32)
COLOR, POC: ABNORMAL
CREAT SERPL-MCNC: 0.9 MG/DL (ref 0.6–1.2)
GFR AFRICAN AMERICAN: >60
GFR NON-AFRICAN AMERICAN: 60
GLUCOSE BLD-MCNC: 118 MG/DL (ref 70–99)
GLUCOSE URINE, POC: ABNORMAL
HCT VFR BLD CALC: 43.6 % (ref 36–48)
HDLC SERPL-MCNC: 57 MG/DL (ref 40–60)
HEMOGLOBIN: 14.5 G/DL (ref 12–16)
KETONES, POC: ABNORMAL
LDL CHOLESTEROL CALCULATED: 79 MG/DL
LEUKOCYTE EST, POC: ABNORMAL
MCH RBC QN AUTO: 32.4 PG (ref 26–34)
MCHC RBC AUTO-ENTMCNC: 33.3 G/DL (ref 31–36)
MCV RBC AUTO: 97.2 FL (ref 80–100)
NITRITE, POC: ABNORMAL
PDW BLD-RTO: 13.8 % (ref 12.4–15.4)
PH, POC: 6.5
PLATELET # BLD: 227 K/UL (ref 135–450)
PMV BLD AUTO: 8.5 FL (ref 5–10.5)
POTASSIUM SERPL-SCNC: 5 MMOL/L (ref 3.5–5.1)
PROTEIN, POC: ABNORMAL
RBC # BLD: 4.49 M/UL (ref 4–5.2)
SODIUM BLD-SCNC: 140 MMOL/L (ref 136–145)
SPECIFIC GRAVITY, POC: 1.01
TOTAL PROTEIN: 6.5 G/DL (ref 6.4–8.2)
TRIGL SERPL-MCNC: 124 MG/DL (ref 0–150)
TSH SERPL DL<=0.05 MIU/L-ACNC: 1.46 UIU/ML (ref 0.27–4.2)
UROBILINOGEN, POC: 0.2
VLDLC SERPL CALC-MCNC: 25 MG/DL
WBC # BLD: 6.8 K/UL (ref 4–11)

## 2018-09-17 PROCEDURE — 99214 OFFICE O/P EST MOD 30 MIN: CPT | Performed by: FAMILY MEDICINE

## 2018-09-17 PROCEDURE — 81002 URINALYSIS NONAUTO W/O SCOPE: CPT | Performed by: FAMILY MEDICINE

## 2018-09-17 PROCEDURE — 36415 COLL VENOUS BLD VENIPUNCTURE: CPT | Performed by: FAMILY MEDICINE

## 2018-09-17 RX ORDER — CELECOXIB 200 MG/1
200 CAPSULE ORAL DAILY
Qty: 90 CAPSULE | Refills: 1 | Status: SHIPPED | OUTPATIENT
Start: 2018-09-17 | End: 2019-04-26 | Stop reason: SDUPTHER

## 2018-09-17 RX ORDER — DULOXETIN HYDROCHLORIDE 60 MG/1
60 CAPSULE, DELAYED RELEASE ORAL DAILY
Qty: 90 CAPSULE | Refills: 1 | Status: SHIPPED | OUTPATIENT
Start: 2018-09-17 | End: 2019-04-26 | Stop reason: SDUPTHER

## 2018-09-17 RX ORDER — AMLODIPINE BESYLATE AND BENAZEPRIL HYDROCHLORIDE 5; 40 MG/1; MG/1
1 CAPSULE ORAL DAILY
Qty: 90 CAPSULE | Refills: 1 | Status: SHIPPED | OUTPATIENT
Start: 2018-09-17 | End: 2019-04-26 | Stop reason: SDUPTHER

## 2018-09-17 RX ORDER — INDAPAMIDE 1.25 MG/1
1.25 TABLET, FILM COATED ORAL DAILY
Qty: 90 TABLET | Refills: 1 | Status: SHIPPED | OUTPATIENT
Start: 2018-09-17 | End: 2019-04-26 | Stop reason: SDUPTHER

## 2018-09-17 RX ORDER — PANTOPRAZOLE SODIUM 40 MG/1
40 TABLET, DELAYED RELEASE ORAL DAILY
Qty: 90 TABLET | Refills: 1 | Status: SHIPPED | OUTPATIENT
Start: 2018-09-17 | End: 2019-04-26 | Stop reason: SDUPTHER

## 2018-09-17 NOTE — PROGRESS NOTES
Subjective:      Patient ID: Hanna Ward is a 80 y.o. female. HPI  Hypertension: Patient here for follow-up of elevated blood pressure. She is not exercising and is adherent to low salt diet. Blood pressure is well controlled at home. Cardiac symptoms none. Patient denies chest pain, chest pressure/discomfort, claudication, dyspnea, exertional chest pressure/discomfort, fatigue, irregular heart beat, lower extremity edema, near-syncope, orthopnea, palpitations, paroxysmal nocturnal dyspnea and syncope. Cardiovascular risk factors: advanced age (older than 54 for men, 72 for women), dyslipidemia, hypertension and obesity (BMI >= 30 kg/m2). Use of agents associated with hypertension: none. History of target organ damage: none. The diuretic had helped her with the edema feels better, tolerate the med's well no SE  She feels more SOB recently with increase in activity, she had Echo years ago, no CP no palpitation she still does her daily activity but she needs to take break in between  Hyperlipidemia: Patient presents with hyperlipidemia. She was tested because HTN. Her last labs showed Total cholesterol see lab . fatigue. There is a family history of hyperlipidemia. There is not a family history of early ischemia heart disease. Pt with history of sleep apnea on CPAP doing okay she is followed by    GERD: Patient complains of heartburn. This has been associated with need to clear throat frequently. She denies no other symptoms. Symptoms have been present for several years. She denies dysphagia. She has not lost weight. She denies melena, hematochezia, hematemesis, and coffee ground emesis. Medical therapy in the past has included proton pump inhibitors. .   Patient seen Ortho recently for left knee pain the did injection now off steroids helps her so much now she feels better, she is concerned about the right knee swelling in the medial aspect coming from the knee has been there for  few weeks  not changing Pt. Patient concern about increased frequency urinating in a little bit of burning Felt the symptoms few days got better then she started feeling again no fever no chills no one lower abdominal pain or back pain no nausea she had history of recurrent UTIs in the past wants her urine to be checked    Review of Systems   Constitutional:. Negative for fever, activity change and appetite change. HENT: Negative. Eyes: Negative. Respiratory: Negative. Cardiovascular: Negative. Gastrointestinal: Negative.     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 REMOVAL  1/08    bilateral    COLONOSCOPY  2005    30, R7X 7X 2005    CYSTOCELE REPAIR  1989    DILATION AND CURETTAGE OF UTERUS  yrs ago    DOPPLER ECHOCARDIOGRAPHY  2007    EYE SURGERY      bilat catarats    HYSTERECTOMY  4/1974    JOINT REPLACEMENT      left hip   [address]        MOHS SURGERY  2/08    nose    OTHER SURGICAL HISTORY  12/22/06    epidural    [facility]    SUTURE REMOVAL  june 2004    sutures in 3 fingers    TONSILLECTOMY      TOTAL HIP ARTHROPLASTY  2006    L     Family History   Problem Relation Age of Onset    Cancer Brother         Prostate    Cancer Gastroesophageal reflux disease without esophagitis  pantoprazole (PROTONIX) 40 MG tablet   4. Pain and swelling of knee, right  US DOPPLER VENOUS LEG RIGHT   5. Dysuria  POCT Urinalysis no Micro    URINE CULTURE   6.  Mixed hyperlipidemia                Plan:      See orders  Increase hydration  Follow-up with  Ortho  ,

## 2018-09-19 DIAGNOSIS — M19.90 ARTHRITIS: ICD-10-CM

## 2018-09-19 LAB — URINE CULTURE, ROUTINE: NORMAL

## 2018-09-20 RX ORDER — CELECOXIB 200 MG/1
CAPSULE ORAL
Qty: 90 CAPSULE | Refills: 1 | OUTPATIENT
Start: 2018-09-20

## 2018-09-20 RX ORDER — PRAVASTATIN SODIUM 20 MG
TABLET ORAL
Qty: 90 TABLET | Refills: 0 | Status: SHIPPED | OUTPATIENT
Start: 2018-09-20 | End: 2019-04-26 | Stop reason: SDUPTHER

## 2018-09-20 NOTE — TELEPHONE ENCOUNTER
Medication:   Requested Prescriptions     Pending Prescriptions Disp Refills    pravastatin (PRAVACHOL) 20 MG tablet [Pharmacy Med Name: PRAVASTATIN TABS 20MG] 90 tablet 0     Sig: TAKE 1 TABLET EVERY EVENING     Last Filled:  7.24.18    Last appt: 9/17/2018   Next appt: Visit date not found    Last Lipid:   Lab Results   Component Value Date    CHOL 161 09/17/2018    TRIG 124 09/17/2018    HDL 57 09/17/2018    HDL 63 05/30/2012    LDLCALC 79 09/17/2018

## 2018-12-04 ENCOUNTER — OFFICE VISIT (OUTPATIENT)
Dept: PULMONOLOGY | Age: 83
End: 2018-12-04
Payer: MEDICARE

## 2018-12-04 VITALS
HEIGHT: 65 IN | BODY MASS INDEX: 36.32 KG/M2 | OXYGEN SATURATION: 97 % | DIASTOLIC BLOOD PRESSURE: 82 MMHG | WEIGHT: 218 LBS | SYSTOLIC BLOOD PRESSURE: 131 MMHG | HEART RATE: 80 BPM

## 2018-12-04 DIAGNOSIS — E66.9 NON MORBID OBESITY, UNSPECIFIED OBESITY TYPE: ICD-10-CM

## 2018-12-04 DIAGNOSIS — J30.9 ALLERGIC RHINITIS, UNSPECIFIED SEASONALITY, UNSPECIFIED TRIGGER: Chronic | ICD-10-CM

## 2018-12-04 DIAGNOSIS — K21.9 GASTROESOPHAGEAL REFLUX DISEASE, ESOPHAGITIS PRESENCE NOT SPECIFIED: Chronic | ICD-10-CM

## 2018-12-04 DIAGNOSIS — I10 ESSENTIAL HYPERTENSION: Chronic | ICD-10-CM

## 2018-12-04 DIAGNOSIS — G47.33 OBSTRUCTIVE SLEEP APNEA: Primary | Chronic | ICD-10-CM

## 2018-12-04 PROCEDURE — 99214 OFFICE O/P EST MOD 30 MIN: CPT | Performed by: INTERNAL MEDICINE

## 2018-12-04 RX ORDER — MODAFINIL 200 MG/1
200 TABLET ORAL EVERY MORNING
Qty: 90 TABLET | Refills: 1 | Status: SHIPPED | OUTPATIENT
Start: 2018-12-04 | End: 2019-06-02

## 2018-12-04 ASSESSMENT — SLEEP AND FATIGUE QUESTIONNAIRES
HOW LIKELY ARE YOU TO NOD OFF OR FALL ASLEEP IN A CAR, WHILE STOPPED FOR A FEW MINUTES IN TRAFFIC: 0
HOW LIKELY ARE YOU TO NOD OFF OR FALL ASLEEP WHILE SITTING INACTIVE IN A PUBLIC PLACE: 0
HOW LIKELY ARE YOU TO NOD OFF OR FALL ASLEEP WHILE WATCHING TV: 1
HOW LIKELY ARE YOU TO NOD OFF OR FALL ASLEEP WHILE LYING DOWN TO REST IN THE AFTERNOON WHEN CIRCUMSTANCES PERMIT: 0
HOW LIKELY ARE YOU TO NOD OFF OR FALL ASLEEP WHILE SITTING AND TALKING TO SOMEONE: 0
ESS TOTAL SCORE: 3
HOW LIKELY ARE YOU TO NOD OFF OR FALL ASLEEP WHEN YOU ARE A PASSENGER IN A CAR FOR AN HOUR WITHOUT A BREAK: 1
HOW LIKELY ARE YOU TO NOD OFF OR FALL ASLEEP WHILE SITTING AND READING: 1
HOW LIKELY ARE YOU TO NOD OFF OR FALL ASLEEP WHILE SITTING QUIETLY AFTER LUNCH WITHOUT ALCOHOL: 0

## 2018-12-04 ASSESSMENT — ENCOUNTER SYMPTOMS
CHOKING: 0
RHINORRHEA: 0
COUGH: 0
SHORTNESS OF BREATH: 0
APNEA: 0

## 2018-12-04 NOTE — LETTER
[facility] Sleep Medicine  [address]  [facility] [address]  Phone: [phone]  Fax: [phone]    December 4, 2018       Patient: Leni Arango   MR Number: [id number]   YOB: 1934   Date of Visit: 12/4/2018       John Hamm was seen for a follow up visit today. Here is my assessment and plan as well as an attached copy of her visit today:    Obstructive sleep apnea  Chronic- Stable. Reviewed compliance download with pt. Supplies and parts as needed for her machine. These are medically necessary. Continue medications per her PCP and other physicians. Limit caffeine use after 3pm.   Cont provigil PRN as needed while driving. Hypertension  Chronic- Stable. Cont meds per PCP and other physicians. GERD (gastroesophageal reflux disease)  Chronic- Stable. Cont meds per PCP and other physicians. Allergic rhinitis  Chronic- Stable. Cont meds per PCP and other physicians. Non morbid obesity, unspecified obesity type  Chronic-Stable. Encouraged her to work on weight loss through diet and exercise. If you have questions or concerns, please do not hesitate to call me. I look forward to following Elizabeth Puente along with you.     Sincerely,    Pauly Horne MD    CC providers:  Radha Stewart MD  [address] [facility]  [address]  [place] [address]  VIA [facility] [address]

## 2018-12-04 NOTE — PROGRESS NOTES
Nicki Cristina MD, FAA, [address]  [facility]  3rd Floor, [address] [facility], [address] [phone]   [address] [place] [address] [address] [place] [address] Esteban   [facility], . [address]ina [address] [phone] [address] [address] [phone]    Assessment/Plan:     Obstructive sleep apnea  Chronic- Stable. Reviewed compliance download with pt. Supplies and parts as needed for her machine. These are medically necessary. Continue medications per her PCP and other physicians. Limit caffeine use after 3pm.   Cont provigil PRN as needed while driving. Hypertension  Chronic- Stable. Cont meds per PCP and other physicians. GERD (gastroesophageal reflux disease)  Chronic- Stable. Cont meds per PCP and other physicians. Allergic rhinitis  Chronic- Stable. Cont meds per PCP and other physicians. Non morbid obesity, unspecified obesity type  Chronic-Stable. Encouraged her to work on weight loss through diet and exercise. The primary encounter diagnosis was Obstructive sleep apnea. Diagnoses of Essential hypertension, Gastroesophageal reflux disease, esophagitis presence not specified, Allergic rhinitis, unspecified seasonality, unspecified trigger, and Non morbid obesity, unspecified obesity type were also pertinent to this visit. The chronic medical conditions listed are directly related to the primary diagnosis listed above. The management of the primary diagnosis affects the secondary diagnosis and vice versa. Orders Placed This Encounter   Medications    modafinil (PROVIGIL) 200 MG tablet     Sig: Take 1 tablet by mouth every morning for 180 days. .     Dispense:  90 tablet     Refill:  1     Subjective:     Patient ID: Jacek Hills is a 80 y.o. female.     Chief Complaint   Patient presents with    Sleep Apnea     Subjective   HPI:    Machine Modem/Download Info:  Compliance (hours/night): 5.5 hrs/night  Download AHI (/hour): 5.6 /HR  Average CPAP Pressure : 15 cmH2O APAP - Settings  Pressure Min: 12 cmH2O  Pressure Max: 20 cmH2O     Comfort Settings  Humidity Level (0-8): 3  Heated Tubing (Yes/No): Yes  Flex/EPR (0-3): 3       NELLY: She continues to do well with her machine at the current settings. No issues with EDS, snoring, or apneas. She is waking refreshed, for the most part, in the am.  No HA, dryness, or congestion. No issues using her machine. Still taking provigil 200 mg PRN when driving ling distance which really helps. No rash or HA on provigil. Hypertension, gastroesophageal reflux disease, allergic rhinitis, and obesity: stable on meds and followed by pt's PCP and other physicians. 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      Social History Main Topics    Smoking status: Never Smoker    Smokeless tobacco: Never Used    Alcohol use Yes      Comment: very rare occasions    Drug use: No    Sexual activity: Yes     Partners: Male     Other Topics Concern    Not on file     Social History Narrative    No narrative on file       Current Outpatient Prescriptions   Medication Sig Dispense Refill    pravastatin (PRAVACHOL) 20 MG tablet TAKE 1 TABLET EVERY EVENING 90 tablet 0    amLODIPine-benazepril (LOTREL) 5-40 MG per capsule Take 1 capsule by mouth daily 90 capsule 1    celecoxib (CELEBREX) 200 MG capsule Take 1 capsule by mouth daily 90 capsule 1    DULoxetine (CYMBALTA) 60 MG extended release capsule Take 1 capsule by mouth daily 90 capsule 1    indapamide (LOZOL) 1.25 MG tablet Take 1 tablet by mouth daily 90 tablet 1    metoprolol tartrate (LOPRESSOR) 25 MG tablet Take 1 tablet by mouth 2 times daily 180 tablet 1    pantoprazole (PROTONIX) 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    Carcinoma nos     Chronic back pain     GERD (gastroesophageal reflux disease)     Hypercholesterolemia     Hyperlipidemia     Hypertension     Menopause age unsure    Non morbid obesity, unspecified obesity type 12/4/2018    Pneumonia     Pneumonia     Postmenopausal hormone replacement therapy     Rectocele 1970s    Seasonal allergies     Sleep apnea 2010    Spinal stenosis of lumbar region     Gets epidural injections every 6 months       Past Surgical History:   Procedure Laterality Date    BREAST BIOPSY Right 5/3/13    biopsy    BREAST LUMPECTOMY  9/27/05    right lumpectomy   [address] REMOVAL  1/08    bilateral    COLONOSCOPY  2005    30, R7X 7X 2005    CYSTOCELE REPAIR  1989    DILATION AND CURETTAGE OF UTERUS  yrs ago    DOPPLER ECHOCARDIOGRAPHY  2007    EYE SURGERY      bilat catarats    HYSTERECTOMY  4/1974    JOINT REPLACEMENT      left hip   [address] [facility]    MOHS SURGERY  2/08    nose    OTHER SURGICAL HISTORY  12/22/06    epidural    [facility]    SUTURE REMOVAL  june 2004    sutures in 3 fingers    TONSILLECTOMY      TOTAL HIP ARTHROPLASTY  2006    L       Family History   Problem Relation Age of Onset    Cancer Brother         Prostate    Cancer Mother         Stomach Cancer at age 80    Stroke Father     Cancer Paternal Grandmother         intraadbominal at age 80    Cancer Other         Leukemia age 70           ROS:  Review of Systems   Constitutional: Negative. HENT: Negative for congestion, ear pain and rhinorrhea. Respiratory: Negative for apnea, cough, choking and shortness of breath. Cardiovascular: Negative for chest pain, palpitations and leg swelling.    Musculoskeletal: Negative

## 2018-12-04 NOTE — ASSESSMENT & PLAN NOTE
Chronic- Stable. Reviewed compliance download with pt. Supplies and parts as needed for her machine. These are medically necessary. Continue medications per her PCP and other physicians. Limit caffeine use after 3pm.   Cont provigil PRN as needed while driving.

## 2019-04-26 ENCOUNTER — OFFICE VISIT (OUTPATIENT)
Dept: FAMILY MEDICINE CLINIC | Age: 84
End: 2019-04-26
Payer: MEDICARE

## 2019-04-26 VITALS
WEIGHT: 217 LBS | OXYGEN SATURATION: 99 % | BODY MASS INDEX: 36.11 KG/M2 | HEART RATE: 78 BPM | SYSTOLIC BLOOD PRESSURE: 138 MMHG | DIASTOLIC BLOOD PRESSURE: 80 MMHG

## 2019-04-26 DIAGNOSIS — M17.12 OSTEOARTHRITIS OF LEFT KNEE, UNSPECIFIED OSTEOARTHRITIS TYPE: ICD-10-CM

## 2019-04-26 DIAGNOSIS — I10 ESSENTIAL HYPERTENSION: Primary | ICD-10-CM

## 2019-04-26 DIAGNOSIS — K21.9 GASTROESOPHAGEAL REFLUX DISEASE WITHOUT ESOPHAGITIS: Chronic | ICD-10-CM

## 2019-04-26 DIAGNOSIS — E55.9 VITAMIN D DEFICIENCY: ICD-10-CM

## 2019-04-26 DIAGNOSIS — M19.90 ARTHRITIS: ICD-10-CM

## 2019-04-26 DIAGNOSIS — E78.2 MIXED HYPERLIPIDEMIA: ICD-10-CM

## 2019-04-26 LAB
ALBUMIN SERPL-MCNC: 4.2 G/DL (ref 3.4–5)
ALP BLD-CCNC: 48 U/L (ref 40–129)
ALT SERPL-CCNC: 32 U/L (ref 10–40)
ANION GAP SERPL CALCULATED.3IONS-SCNC: 16 MMOL/L (ref 3–16)
AST SERPL-CCNC: 50 U/L (ref 15–37)
BILIRUB SERPL-MCNC: 0.6 MG/DL (ref 0–1)
BILIRUBIN DIRECT: <0.2 MG/DL (ref 0–0.3)
BILIRUBIN, INDIRECT: ABNORMAL MG/DL (ref 0–1)
BUN BLDV-MCNC: 17 MG/DL (ref 7–20)
CALCIUM SERPL-MCNC: 9.4 MG/DL (ref 8.3–10.6)
CHLORIDE BLD-SCNC: 99 MMOL/L (ref 99–110)
CHOLESTEROL, TOTAL: 172 MG/DL (ref 0–199)
CO2: 25 MMOL/L (ref 21–32)
CREAT SERPL-MCNC: 0.7 MG/DL (ref 0.6–1.2)
GFR AFRICAN AMERICAN: >60
GFR NON-AFRICAN AMERICAN: >60
GLUCOSE BLD-MCNC: 141 MG/DL (ref 70–99)
HCT VFR BLD CALC: 42.1 % (ref 36–48)
HDLC SERPL-MCNC: 49 MG/DL (ref 40–60)
HEMOGLOBIN: 14.3 G/DL (ref 12–16)
LDL CHOLESTEROL CALCULATED: 91 MG/DL
MCH RBC QN AUTO: 33.2 PG (ref 26–34)
MCHC RBC AUTO-ENTMCNC: 34 G/DL (ref 31–36)
MCV RBC AUTO: 97.6 FL (ref 80–100)
PDW BLD-RTO: 13.3 % (ref 12.4–15.4)
PLATELET # BLD: 210 K/UL (ref 135–450)
PLATELET SLIDE REVIEW: ADEQUATE
PMV BLD AUTO: 10.4 FL (ref 5–10.5)
POTASSIUM SERPL-SCNC: 5.5 MMOL/L (ref 3.5–5.1)
RBC # BLD: 4.32 M/UL (ref 4–5.2)
SODIUM BLD-SCNC: 140 MMOL/L (ref 136–145)
TOTAL PROTEIN: 7 G/DL (ref 6.4–8.2)
TRIGL SERPL-MCNC: 160 MG/DL (ref 0–150)
TSH SERPL DL<=0.05 MIU/L-ACNC: 2.39 UIU/ML (ref 0.27–4.2)
VITAMIN D 25-HYDROXY: 24.3 NG/ML
VLDLC SERPL CALC-MCNC: 32 MG/DL
WBC # BLD: 8.4 K/UL (ref 4–11)

## 2019-04-26 PROCEDURE — 36415 COLL VENOUS BLD VENIPUNCTURE: CPT | Performed by: FAMILY MEDICINE

## 2019-04-26 PROCEDURE — 99214 OFFICE O/P EST MOD 30 MIN: CPT | Performed by: FAMILY MEDICINE

## 2019-04-26 RX ORDER — DULOXETIN HYDROCHLORIDE 60 MG/1
60 CAPSULE, DELAYED RELEASE ORAL DAILY
Qty: 90 CAPSULE | Refills: 1 | Status: SHIPPED | OUTPATIENT
Start: 2019-04-26 | End: 2019-08-12 | Stop reason: SDUPTHER

## 2019-04-26 RX ORDER — PRAVASTATIN SODIUM 20 MG
20 TABLET ORAL DAILY
Qty: 90 TABLET | Refills: 1 | Status: SHIPPED | OUTPATIENT
Start: 2019-04-26 | End: 2019-08-12 | Stop reason: SDUPTHER

## 2019-04-26 RX ORDER — CELECOXIB 200 MG/1
200 CAPSULE ORAL DAILY
Qty: 90 CAPSULE | Refills: 1 | Status: SHIPPED | OUTPATIENT
Start: 2019-04-26 | End: 2019-08-12 | Stop reason: SDUPTHER

## 2019-04-26 RX ORDER — PANTOPRAZOLE SODIUM 40 MG/1
40 TABLET, DELAYED RELEASE ORAL DAILY
Qty: 90 TABLET | Refills: 1 | Status: SHIPPED | OUTPATIENT
Start: 2019-04-26 | End: 2019-08-12 | Stop reason: SDUPTHER

## 2019-04-26 RX ORDER — AMLODIPINE BESYLATE AND BENAZEPRIL HYDROCHLORIDE 5; 40 MG/1; MG/1
1 CAPSULE ORAL DAILY
Qty: 90 CAPSULE | Refills: 1 | Status: SHIPPED | OUTPATIENT
Start: 2019-04-26 | End: 2020-03-19

## 2019-04-26 RX ORDER — INDAPAMIDE 1.25 MG/1
1.25 TABLET, FILM COATED ORAL DAILY
Qty: 90 TABLET | Refills: 1 | Status: SHIPPED | OUTPATIENT
Start: 2019-04-26 | End: 2019-08-12 | Stop reason: SDUPTHER

## 2019-04-26 ASSESSMENT — PATIENT HEALTH QUESTIONNAIRE - PHQ9
SUM OF ALL RESPONSES TO PHQ9 QUESTIONS 1 & 2: 0
SUM OF ALL RESPONSES TO PHQ QUESTIONS 1-9: 0
SUM OF ALL RESPONSES TO PHQ QUESTIONS 1-9: 0
2. FEELING DOWN, DEPRESSED OR HOPELESS: 0
1. LITTLE INTEREST OR PLEASURE IN DOING THINGS: 0

## 2019-04-26 NOTE — PROGRESS NOTES
Subjective:      Patient ID: Jamari Omer is a 80 y.o. female. HPI  Hypertension: Patient here for follow-up of elevated blood pressure. She is not exercising and is adherent to low salt diet. Blood pressure is well controlled at home. Cardiac symptoms none. Patient denies chest pain, chest pressure/discomfort, claudication, dyspnea, exertional chest pressure/discomfort, fatigue, irregular heart beat, lower extremity edema, near-syncope, orthopnea, palpitations, paroxysmal nocturnal dyspnea and syncope. Cardiovascular risk factors: advanced age (older than 54 for men, 72 for women), dyslipidemia, hypertension and obesity (BMI >= 30 kg/m2). Use of agents associated with hypertension: none. History of target organ damage: none. Hyperlipidemia: Patient presents with hyperlipidemia. She was tested because HTN. Her last labs showed Total cholesterol see lab . fatigue. There is a family history of hyperlipidemia. There is not a family history of early ischemia heart disease. Pt with history of sleep apnea on CPAP doing okay she is followed by    GERD: Patient complains of heartburn. This has been associated with need to clear throat frequently. She denies no other symptoms. Symptoms have been present for several years. She denies dysphagia. She has not lost weight. She denies melena, hematochezia, hematemesis, and coffee ground emesis. Medical therapy in the past has included proton pump inhibitors. .   Patient with left knee pain on and off she sees Ortho, not interested in surgery now she may be calling them for steroid injection   Patient recently was diagnosed with all cancer she seen a dentist/and an oral surgeon took care of it the recheck was completely fine  Issue with low vitamin D  Was diagnosed with abnormal memory gram she had history of breast cancer years ago, the biopsy show no evidence of atypia or malignancy recommend a 6 months follow-up  Constitutional:. Negative for fever, on file    Highest education level: Not on file   Occupational History    Not on file   Social Needs    Financial resource strain: Not on file    Food insecurity:     Worry: Not on file     Inability: Not on file    Transportation needs:     Medical: Not on file     Non-medical: Not on file   Tobacco Use    Smoking status: Never Smoker    Smokeless tobacco: Never Used   Substance and Sexual Activity    Alcohol use: Yes     Comment: very rare occasions    Drug use: No    Sexual activity: Yes     Partners: Male   Lifestyle    Physical activity:     Days per week: Not on file     Minutes per session: Not on file    Stress: Not on file   Relationships    Social connections:     Talks on phone: Not on file     Gets together: Not on file     Attends Advent service: Not on file     Active member of club or organization: Not on file     Attends meetings of clubs or organizations: Not on file     Relationship status: Not on file    Intimate partner violence:     Fear of current or ex partner: Not on file     Emotionally abused: Not on file     Physically abused: Not on file     Forced sexual activity: Not on file   Other Topics Concern    Not on file   Social History Narrative    Not on file     Current Outpatient Medications   Medication Sig Dispense Refill    modafinil (PROVIGIL) 200 MG tablet Take 1 tablet by mouth every morning for 180 days. . 90 tablet 1    pravastatin (PRAVACHOL) 20 MG tablet TAKE 1 TABLET EVERY EVENING 90 tablet 0    amLODIPine-benazepril (LOTREL) 5-40 MG per capsule Take 1 capsule by mouth daily 90 capsule 1    celecoxib (CELEBREX) 200 MG capsule Take 1 capsule by mouth daily 90 capsule 1    DULoxetine (CYMBALTA) 60 MG extended release capsule Take 1 capsule by mouth daily 90 capsule 1    metoprolol tartrate (LOPRESSOR) 25 MG tablet Take 1 tablet by mouth 2 times daily 180 tablet 1    pantoprazole (PROTONIX) 40 MG tablet Take 1 tablet by mouth daily 90 tablet 1    Calcium Polycarbophil (FIBER-CAPS PO) Take by mouth      Multiple Vitamins-Minerals (VISION VITAMINS PO) Take by mouth      vitamin D (ERGOCALCIFEROL) 33784 units CAPS capsule       Cholecalciferol (VITAMIN D3) 64141 units CAPS Take by mouth      meloxicam (MOBIC) 15 MG tablet Take 1 tablet by mouth daily 30 tablet 3    vitamin B-12 (CYANOCOBALAMIN) 1000 MCG tablet Take 1,000 mcg by mouth daily      meclizine (ANTIVERT) 25 MG tablet Take 25 mg by mouth 2 times daily.  vitamin E 1000 UNITS capsule Take 1,000 Units by mouth daily.  Methylcellulose, Laxative, (CITRUCEL) 500 MG TABS Take 1,000 mg by mouth daily.  Ascorbic Acid (VITAMIN C) 500 MG tablet Take 500 mg by mouth daily.  Pyridoxine HCl (VITAMIN B-6) 100 MG tablet Take 100 mg by mouth daily.  indapamide (LOZOL) 1.25 MG tablet Take 1 tablet by mouth daily 90 tablet 1    ergocalciferol (ERGOCALCIFEROL) 30393 units capsule Take 50,000 Units by mouth      fluticasone (FLONASE) 50 MCG/ACT nasal spray 2 sprays by Nasal route daily 3 Bottle 3     No current facility-administered medications for this visit. Allergies   Allergen Reactions    Lodine [Etodolac] Rash         Objective:   Physical Exam   Constitutional: She is oriented to person, place, and time. HENT:   Mouth/Throat: Oropharynx is clear and moist.   Eyes: Conjunctivae are normal. No scleral icterus. Neck: No JVD present. No thyromegaly present. Cardiovascular: Normal rate, regular rhythm, normal heart sounds and intact distal pulses. Exam reveals no gallop and no friction rub. No murmur heard. Pulmonary/Chest: Effort normal and breath sounds normal. She has no wheezes. She has no rales. Abdominal: Soft. Bowel sounds are normal. She exhibits no distension and no mass. There is no hepatosplenomegaly. There is no tenderness. No hernia. Musculoskeletal: She exhibits no edema. Lymphadenopathy:     She has no cervical adenopathy.    Neurological: She is alert and oriented to person, place, and time. Skin: No rash noted. Psychiatric: She has a normal mood and affect. Vitals reviewed. Assessment:         Diagnosis Orders   1. Essential hypertension  CBC    Basic Metabolic Panel    Hepatic Function Panel    TSH without Reflex    Lipid Panel    Lipid Panel    TSH without Reflex    Hepatic Function Panel    Basic Metabolic Panel    CBC   2. Mixed hyperlipidemia  CBC    Basic Metabolic Panel    Hepatic Function Panel    TSH without Reflex    Lipid Panel    Lipid Panel    TSH without Reflex    Hepatic Function Panel    Basic Metabolic Panel    CBC   3. Osteoarthritis of left knee, unspecified osteoarthritis type     4. Arthritis  amLODIPine-benazepril (LOTREL) 5-40 MG per capsule    celecoxib (CELEBREX) 200 MG capsule   5. Gastroesophageal reflux disease without esophagitis  pantoprazole (PROTONIX) 40 MG tablet   6.  Vitamin D deficiency  Vitamin D 25 Hydroxy    Vitamin D 25 Hydroxy              Plan:      See orders    ,

## 2019-04-29 DIAGNOSIS — R73.9 HYPERGLYCEMIA: Primary | ICD-10-CM

## 2019-04-30 DIAGNOSIS — R73.9 HYPERGLYCEMIA: ICD-10-CM

## 2019-04-30 LAB
ESTIMATED AVERAGE GLUCOSE: 148.5 MG/DL
HBA1C MFR BLD: 6.8 %

## 2019-06-03 ENCOUNTER — OFFICE VISIT (OUTPATIENT)
Dept: PULMONOLOGY | Age: 84
End: 2019-06-03
Payer: MEDICARE

## 2019-06-03 VITALS
BODY MASS INDEX: 36.32 KG/M2 | SYSTOLIC BLOOD PRESSURE: 118 MMHG | HEIGHT: 65 IN | DIASTOLIC BLOOD PRESSURE: 70 MMHG | OXYGEN SATURATION: 97 % | WEIGHT: 218 LBS | HEART RATE: 82 BPM

## 2019-06-03 DIAGNOSIS — I10 ESSENTIAL HYPERTENSION: Chronic | ICD-10-CM

## 2019-06-03 DIAGNOSIS — K21.9 GASTROESOPHAGEAL REFLUX DISEASE, ESOPHAGITIS PRESENCE NOT SPECIFIED: Chronic | ICD-10-CM

## 2019-06-03 DIAGNOSIS — E66.9 NON MORBID OBESITY, UNSPECIFIED OBESITY TYPE: Chronic | ICD-10-CM

## 2019-06-03 DIAGNOSIS — G47.33 OBSTRUCTIVE SLEEP APNEA: Chronic | ICD-10-CM

## 2019-06-03 PROCEDURE — 99214 OFFICE O/P EST MOD 30 MIN: CPT | Performed by: INTERNAL MEDICINE

## 2019-06-03 RX ORDER — MODAFINIL 200 MG/1
200 TABLET ORAL PRN
COMMUNITY
End: 2022-02-15

## 2019-06-03 ASSESSMENT — ENCOUNTER SYMPTOMS
SHORTNESS OF BREATH: 0
RHINORRHEA: 0
CHOKING: 0
COUGH: 0
APNEA: 0

## 2019-06-03 ASSESSMENT — SLEEP AND FATIGUE QUESTIONNAIRES
HOW LIKELY ARE YOU TO NOD OFF OR FALL ASLEEP IN A CAR, WHILE STOPPED FOR A FEW MINUTES IN TRAFFIC: 0
HOW LIKELY ARE YOU TO NOD OFF OR FALL ASLEEP WHILE SITTING QUIETLY AFTER LUNCH WITHOUT ALCOHOL: 1
HOW LIKELY ARE YOU TO NOD OFF OR FALL ASLEEP WHILE SITTING INACTIVE IN A PUBLIC PLACE: 0
HOW LIKELY ARE YOU TO NOD OFF OR FALL ASLEEP WHEN YOU ARE A PASSENGER IN A CAR FOR AN HOUR WITHOUT A BREAK: 0
HOW LIKELY ARE YOU TO NOD OFF OR FALL ASLEEP WHILE SITTING AND READING: 0
HOW LIKELY ARE YOU TO NOD OFF OR FALL ASLEEP WHILE LYING DOWN TO REST IN THE AFTERNOON WHEN CIRCUMSTANCES PERMIT: 0
HOW LIKELY ARE YOU TO NOD OFF OR FALL ASLEEP WHILE WATCHING TV: 0
ESS TOTAL SCORE: 1
HOW LIKELY ARE YOU TO NOD OFF OR FALL ASLEEP WHILE SITTING AND TALKING TO SOMEONE: 0

## 2019-06-03 NOTE — LETTER
[facility]  [address] [place] [address]  Phone: [phone]  Fax: [phone]    Anne Marie 3, 2019       Patient: Rosetta Larkin   MR Number: [id number]   YOB: 1934   Date of Visit: 6/3/2019       Heather Vallejo was seen for a follow up visit today. Here is my assessment and plan as well as an attached copy of her visit today:    Obstructive sleep apnea  Chronic- Stable. Reviewed compliance download with pt. Supplies and parts as needed for her machine. These are medically necessary. Continue medications per her PCP and other physicians. Limit caffeine use after 3pm.  Cont provigil 200 mg when driving. Essential hypertension  Chronic- Stable. Cont meds per PCP and other physicians. GERD (gastroesophageal reflux disease)  Chronic- Stable. Cont meds per PCP and other physicians. Non morbid obesity, unspecified obesity type  Chronic-Stable. Encouraged her to work on weight loss through diet and exercise. If you have questions or concerns, please do not hesitate to call me. I look forward to following Binta Still along with you.     Sincerely,    Desirae Kilpatrick MD    CC providers:  Alexander Hatfield MD  [address] [facility]  [address]  [place] [address]  VIA [facility] [place]

## 2019-06-03 NOTE — PROGRESS NOTES
Cris Moran MD, FAA, [address]  [facility]  3rd Floor, [address] [facility], [address] [phone]   [address] [place] [address] [address] [place] [address] Ciara Case. Jina Gal[address] [phone] [address] [facility]  [address] [place] [id number] [phone]    Assessment/Plan:     Obstructive sleep apnea  Chronic- Stable. Reviewed compliance download with pt. Supplies and parts as needed for her machine. These are medically necessary. Continue medications per her PCP and other physicians. Limit caffeine use after 3pm.  Cont provigil 200 mg when driving. Essential hypertension  Chronic- Stable. Cont meds per PCP and other physicians. GERD (gastroesophageal reflux disease)  Chronic- Stable. Cont meds per PCP and other physicians. Non morbid obesity, unspecified obesity type  Chronic-Stable. Encouraged her to work on weight loss through diet and exercise. Diagnoses of Obstructive sleep apnea, Essential hypertension, Gastroesophageal reflux disease, esophagitis presence not specified, and Non morbid obesity, unspecified obesity type were pertinent to this visit. The chronic medical conditions listed are directly related to the primary diagnosis listed above. The management of the primary diagnosis affects the secondary diagnosis and vice versa. Subjective:     Patient ID: Marck Fisher is a 80 y.o. female. Chief Complaint   Patient presents with    Sleep Apnea     Subjective   HPI:    Machine Modem/Download Info:  Compliance (hours/night): 4.7 hrs/night  Download AHI (/hour): 4.4 /HR  Average CPAP Pressure : 15.1 cmH2O APAP - Settings  Pressure Min: 12 cmH2O  Pressure Max: 20 cmH2O     Comfort Settings  Humidity Level (0-8): 3  Heated Tubing (Yes/No): Yes  Flex/EPR (0-3): 3       NELLY: She continues to do well with her machine at the current settings.   No issues with EDS, snoring, or apneas. She is waking refreshed, for the most part, in the am.  No HA, dryness, or congestion. No issues using her machine. 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 Using provigil 200 mg driving which really helps. No rash or oral ulcers. Hypertension, gastroesophageal reflux disease, and obesity: stable on meds and followed by pt's PCP and other physicians.     DME [facility]    [place] - Total score: 1    Social History     Socioeconomic History    Marital status:      Spouse name: Sherie Saeed Number of children: 2    Years of education: Not on file    Highest education level: Not on file   Occupational History    Not on file   Social Needs    Financial resource strain: Not on file    Food insecurity:     Worry: Not on file     Inability: Not on file    Transportation needs:     Medical: Not on file     Non-medical: Not on file   Tobacco Use    Smoking status: Never Smoker    Smokeless tobacco: Never Used   Substance and Sexual Activity    Alcohol use: Yes     Comment: very rare occasions    Drug use: No    Sexual activity: Yes     Partners: Male   Lifestyle    Physical activity:     Days per week: Not on file     Minutes per session: Not on file    Stress: Not on file   Relationships    Social connections:     Talks on phone: Not on file     Gets together: Not on file     Attends Quaker service: Not on file     Active member of club or organization: Not on file     Attends meetings of clubs or organizations: Not on file     Relationship status: Not on file    Intimate partner violence:     Fear of current or ex partner: Not on file     Emotionally abused: Not on file     Physically abused: Not on file     Forced sexual activity: Not on file   Other Topics Concern    Not on file   Social History Narrative    Not on file       Current Outpatient Medications   Medication Sig Dispense Refill    modafinil (PROVIGIL) 200 MG tablet Take 200 mg by mouth daily.  pravastatin (PRAVACHOL) 20 MG tablet Take 1 tablet by mouth daily 90 tablet 1    amLODIPine-benazepril (LOTREL) 5-40 MG per capsule Take 1 capsule by mouth daily 90 capsule 1    celecoxib (CELEBREX) 200 MG capsule Take 1 capsule by mouth daily 90 capsule 1    DULoxetine (CYMBALTA) 60 MG extended release capsule Take 1 capsule by mouth daily 90 capsule 1    metoprolol tartrate (LOPRESSOR) 25 MG tablet Take 1 tablet by mouth 2 times daily 180 tablet 1    pantoprazole (PROTONIX) 40 MG tablet Take 1 tablet by mouth daily 90 tablet 1    indapamide (LOZOL) 1.25 MG tablet Take 1 tablet by mouth daily 90 tablet 1    ergocalciferol (ERGOCALCIFEROL) 17556 units capsule Take 50,000 Units by mouth      Calcium Polycarbophil (FIBER-CAPS PO) Take by mouth      Multiple Vitamins-Minerals (VISION VITAMINS PO) Take by mouth      fluticasone (FLONASE) 50 MCG/ACT nasal spray 2 sprays by Nasal route daily 3 Bottle 3    vitamin D (ERGOCALCIFEROL) 64284 units CAPS capsule       Cholecalciferol (VITAMIN D3) 86112 units CAPS Take by mouth      meloxicam (MOBIC) 15 MG tablet Take 1 tablet by mouth daily 30 tablet 3    vitamin B-12 (CYANOCOBALAMIN) 1000 MCG tablet Take 1,000 mcg by mouth daily      meclizine (ANTIVERT) 25 MG tablet Take 25 mg by mouth 2 times daily.  vitamin E 1000 UNITS capsule Take 1,000 Units by mouth daily.  Methylcellulose, Laxative, (CITRUCEL) 500 MG TABS Take 1,000 mg by mouth daily.  Ascorbic Acid (VITAMIN C) 500 MG tablet Take 500 mg by mouth daily.  Pyridoxine HCl (VITAMIN B-6) 100 MG tablet Take 100 mg by mouth daily. No current facility-administered medications for this visit.         Allergies as of 06/03/2019 - Review Complete 06/03/2019   Allergen Reaction Noted    Lodine [etodolac] Rash 07/07/2010       Patient Active Problem List   Diagnosis    Malignant neoplasm of breast ([facility] [address]    Hyperlipidemia    Essential hypertension    Hypercholesterolemia    Rectocele    Arthritis    Spinal stenosis of lumbar region    Numbness and tingling of both legs    GERD (gastroesophageal reflux disease)    Neuropathy    B12 deficiency    Spinal stenosis    Elevated LFTs    Breast cancer in situ    Allergic rhinitis    Myxoma    Wound check, abscess    Obstructive sleep apnea    Non morbid obesity, unspecified obesity type       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 replacement therapy     Rectocele 1970s    Seasonal allergies     Sleep apnea 2010    Spinal stenosis of lumbar region     Gets epidural injections every 6 months       Past Surgical History:   Procedure Laterality Date    BREAST BIOPSY Right 5/3/13    biopsy    BREAST LUMPECTOMY  9/27/05    right lumpectomy   [address] [place] [address] CATARACT REMOVAL  1/08    bilateral    COLONOSCOPY  2005    30, R7X 7X 2005    CYSTOCELE REPAIR  1989    DILATION AND CURETTAGE OF UTERUS  yrs ago    DOPPLER ECHOCARDIOGRAPHY  2007    EYE SURGERY      bilat catarats    HYSTERECTOMY  4/1974    JOINT REPLACEMENT      left hip    KNEE SURGERY  1991    L    KNEE SURGERY  1993    R    MOHS SURGERY  2/08    nose    OTHER SURGICAL HISTORY  12/22/06    epidural    RECTOCELE REPAIR  1989    SUTURE REMOVAL  june 2004    sutures in 3 fingers    TONSILLECTOMY      TOTAL HIP ARTHROPLASTY  2006    L       Family History   Problem Relation Age of Onset    Cancer Brother         Prostate    Cancer Mother         Stomach Cancer

## 2019-08-12 ENCOUNTER — TELEPHONE (OUTPATIENT)
Dept: PRIMARY CARE CLINIC | Age: 84
End: 2019-08-12

## 2019-08-12 ENCOUNTER — OFFICE VISIT (OUTPATIENT)
Dept: PRIMARY CARE CLINIC | Age: 84
End: 2019-08-12
Payer: MEDICARE

## 2019-08-12 VITALS
SYSTOLIC BLOOD PRESSURE: 142 MMHG | HEART RATE: 63 BPM | BODY MASS INDEX: 35.61 KG/M2 | WEIGHT: 214 LBS | DIASTOLIC BLOOD PRESSURE: 73 MMHG | RESPIRATION RATE: 14 BRPM | OXYGEN SATURATION: 97 %

## 2019-08-12 DIAGNOSIS — R73.9 HYPERGLYCEMIA: ICD-10-CM

## 2019-08-12 DIAGNOSIS — R30.0 DYSURIA: ICD-10-CM

## 2019-08-12 DIAGNOSIS — I10 ESSENTIAL HYPERTENSION: Chronic | ICD-10-CM

## 2019-08-12 DIAGNOSIS — I10 ESSENTIAL HYPERTENSION: Primary | Chronic | ICD-10-CM

## 2019-08-12 DIAGNOSIS — M19.90 ARTHRITIS: ICD-10-CM

## 2019-08-12 DIAGNOSIS — K21.9 GASTROESOPHAGEAL REFLUX DISEASE WITHOUT ESOPHAGITIS: Chronic | ICD-10-CM

## 2019-08-12 DIAGNOSIS — E78.2 MIXED HYPERLIPIDEMIA: Chronic | ICD-10-CM

## 2019-08-12 LAB
BILIRUBIN, POC: ABNORMAL
BLOOD URINE, POC: ABNORMAL
CLARITY, POC: ABNORMAL
COLOR, POC: YELLOW
GLUCOSE URINE, POC: ABNORMAL
HCT VFR BLD CALC: 41.4 % (ref 36–48)
HEMOGLOBIN: 14.1 G/DL (ref 12–16)
KETONES, POC: ABNORMAL
LEUKOCYTE EST, POC: ABNORMAL
MCH RBC QN AUTO: 33 PG (ref 26–34)
MCHC RBC AUTO-ENTMCNC: 34 G/DL (ref 31–36)
MCV RBC AUTO: 97.2 FL (ref 80–100)
NITRITE, POC: ABNORMAL
PDW BLD-RTO: 14.4 % (ref 12.4–15.4)
PH, POC: 7
PLATELET # BLD: 222 K/UL (ref 135–450)
PMV BLD AUTO: 8.9 FL (ref 5–10.5)
PROTEIN, POC: ABNORMAL
RBC # BLD: 4.26 M/UL (ref 4–5.2)
SPECIFIC GRAVITY, POC: 1.01
UROBILINOGEN, POC: 0.2
WBC # BLD: 8.2 K/UL (ref 4–11)

## 2019-08-12 PROCEDURE — 81002 URINALYSIS NONAUTO W/O SCOPE: CPT | Performed by: FAMILY MEDICINE

## 2019-08-12 PROCEDURE — 99214 OFFICE O/P EST MOD 30 MIN: CPT | Performed by: FAMILY MEDICINE

## 2019-08-12 RX ORDER — PRAVASTATIN SODIUM 20 MG
20 TABLET ORAL DAILY
Qty: 90 TABLET | Refills: 1 | Status: SHIPPED | OUTPATIENT
Start: 2019-08-12 | End: 2019-08-13 | Stop reason: SDUPTHER

## 2019-08-12 RX ORDER — FLUTICASONE PROPIONATE 50 MCG
2 SPRAY, SUSPENSION (ML) NASAL DAILY
Qty: 3 BOTTLE | Refills: 1 | Status: SHIPPED | OUTPATIENT
Start: 2019-08-12 | End: 2019-08-13 | Stop reason: SDUPTHER

## 2019-08-12 RX ORDER — INDAPAMIDE 1.25 MG/1
1.25 TABLET, FILM COATED ORAL DAILY
Qty: 90 TABLET | Refills: 1 | Status: SHIPPED | OUTPATIENT
Start: 2019-08-12 | End: 2019-08-13 | Stop reason: SDUPTHER

## 2019-08-12 RX ORDER — CELECOXIB 200 MG/1
200 CAPSULE ORAL DAILY
Qty: 90 CAPSULE | Refills: 1 | Status: SHIPPED | OUTPATIENT
Start: 2019-08-12 | End: 2020-04-10

## 2019-08-12 RX ORDER — DULOXETIN HYDROCHLORIDE 60 MG/1
60 CAPSULE, DELAYED RELEASE ORAL DAILY
Qty: 90 CAPSULE | Refills: 1 | Status: SHIPPED | OUTPATIENT
Start: 2019-08-12 | End: 2019-08-13 | Stop reason: SDUPTHER

## 2019-08-12 RX ORDER — PANTOPRAZOLE SODIUM 40 MG/1
40 TABLET, DELAYED RELEASE ORAL DAILY
Qty: 90 TABLET | Refills: 1 | Status: SHIPPED | OUTPATIENT
Start: 2019-08-12 | End: 2019-08-13 | Stop reason: SDUPTHER

## 2019-08-12 RX ORDER — PHENAZOPYRIDINE HYDROCHLORIDE 200 MG/1
200 TABLET, FILM COATED ORAL 3 TIMES DAILY PRN
Qty: 9 TABLET | Refills: 1 | Status: SHIPPED | OUTPATIENT
Start: 2019-08-12 | End: 2019-08-15

## 2019-08-12 NOTE — TELEPHONE ENCOUNTER
Pt has an appt set up long ago at 10.28.19 at 11:15am and the pt is asking Dr. Davy Barros is that ok to keep?    Call pt at [phone]

## 2019-08-12 NOTE — PROGRESS NOTES
Socioeconomic History    Marital status:      Spouse name: Kyara Shah Number of children: 2    Years of education: None    Highest education level: None   Occupational History    None   Social Needs    Financial resource strain: None    Food insecurity:     Worry: None     Inability: None    Transportation needs:     Medical: None     Non-medical: None   Tobacco Use    Smoking status: Never Smoker    Smokeless tobacco: Never Used   Substance and Sexual Activity    Alcohol use: Yes     Comment: very rare occasions    Drug use: No    Sexual activity: Yes     Partners: Male   Lifestyle    Physical activity:     Days per week: None     Minutes per session: None    Stress: None   Relationships    Social connections:     Talks on phone: None     Gets together: None     Attends Zoroastrian service: None     Active member of club or organization: None     Attends meetings of clubs or organizations: None     Relationship status: None    Intimate partner violence:     Fear of current or ex partner: None     Emotionally abused: None     Physically abused: None     Forced sexual activity: None   Other Topics Concern    None   Social History Narrative    None     Current Outpatient Medications   Medication Sig Dispense Refill    modafinil (PROVIGIL) 200 MG tablet Take 200 mg by mouth daily.       pravastatin (PRAVACHOL) 20 MG tablet Take 1 tablet by mouth daily 90 tablet 1    amLODIPine-benazepril (LOTREL) 5-40 MG per capsule Take 1 capsule by mouth daily 90 capsule 1    celecoxib (CELEBREX) 200 MG capsule Take 1 capsule by mouth daily 90 capsule 1    DULoxetine (CYMBALTA) 60 MG extended release capsule Take 1 capsule by mouth daily 90 capsule 1    metoprolol tartrate (LOPRESSOR) 25 MG tablet Take 1 tablet by mouth 2 times daily 180 tablet 1    pantoprazole (PROTONIX) 40 MG tablet Take 1 tablet by mouth daily 90 tablet 1    indapamide (LOZOL) 1.25 MG tablet Take 1 tablet by mouth daily 90 tablet 1  ergocalciferol (ERGOCALCIFEROL) 77365 units capsule Take 50,000 Units by mouth      Calcium Polycarbophil (FIBER-CAPS PO) Take by mouth      Multiple Vitamins-Minerals (VISION VITAMINS PO) Take by mouth      fluticasone (FLONASE) 50 MCG/ACT nasal spray 2 sprays by Nasal route daily 3 Bottle 3    vitamin D (ERGOCALCIFEROL) 55657 units CAPS capsule       Cholecalciferol (VITAMIN D3) 99273 units CAPS Take by mouth      meloxicam (MOBIC) 15 MG tablet Take 1 tablet by mouth daily 30 tablet 3    vitamin B-12 (CYANOCOBALAMIN) 1000 MCG tablet Take 1,000 mcg by mouth daily      meclizine (ANTIVERT) 25 MG tablet Take 25 mg by mouth 2 times daily.  vitamin E 1000 UNITS capsule Take 1,000 Units by mouth daily.  Methylcellulose, Laxative, (CITRUCEL) 500 MG TABS Take 1,000 mg by mouth daily.  Ascorbic Acid (VITAMIN C) 500 MG tablet Take 500 mg by mouth daily.  Pyridoxine HCl (VITAMIN B-6) 100 MG tablet Take 100 mg by mouth daily. No current facility-administered medications for this visit. Allergies   Allergen Reactions    Lodine [Etodolac] Rash         Objective:   Physical Exam   Constitutional: She is oriented to person, place, and time. HENT:   Mouth/Throat: Oropharynx is clear and moist.   Eyes: Conjunctivae are normal. No scleral icterus. Neck: No JVD present. No thyromegaly present. Cardiovascular: Normal rate, regular rhythm, normal heart sounds and intact distal pulses. Exam reveals no gallop and no friction rub. No murmur heard. Pulmonary/Chest: Effort normal and breath sounds normal. She has no wheezes. She has no rales. Abdominal: Soft. Bowel sounds are normal. She exhibits no distension and no mass. There is no hepatosplenomegaly. There is no tenderness. No hernia. Musculoskeletal: She exhibits no edema. Lymphadenopathy:     She has no cervical adenopathy. Neurological: She is alert and oriented to person, place, and time. Skin: No rash noted. Psychiatric: She has a normal mood and affect. Vitals reviewed. Assessment:         Diagnosis Orders   1. Essential hypertension  CBC    Basic Metabolic Panel    Hemoglobin A1C    TSH without Reflex    Lipid Panel    Hepatic Function Panel   2. Gastroesophageal reflux disease without esophagitis  pantoprazole (PROTONIX) 40 MG tablet   3. Arthritis  celecoxib (CELEBREX) 200 MG capsule   4. Mixed hyperlipidemia     5. Hyperglycemia  Basic Metabolic Panel    Hemoglobin A1C   6.  Dysuria  POCT Urinalysis no Micro    URINE CULTURE            Plan:      See orders  Increase  Hydration   May need to be on med's for DM     ,

## 2019-08-13 DIAGNOSIS — K21.9 GASTROESOPHAGEAL REFLUX DISEASE WITHOUT ESOPHAGITIS: Chronic | ICD-10-CM

## 2019-08-13 LAB
ALBUMIN SERPL-MCNC: 4.4 G/DL (ref 3.4–5)
ALP BLD-CCNC: 46 U/L (ref 40–129)
ALT SERPL-CCNC: 29 U/L (ref 10–40)
ANION GAP SERPL CALCULATED.3IONS-SCNC: 15 MMOL/L (ref 3–16)
AST SERPL-CCNC: 22 U/L (ref 15–37)
BILIRUB SERPL-MCNC: 0.9 MG/DL (ref 0–1)
BILIRUBIN DIRECT: <0.2 MG/DL (ref 0–0.3)
BILIRUBIN, INDIRECT: NORMAL MG/DL (ref 0–1)
BUN BLDV-MCNC: 18 MG/DL (ref 7–20)
CALCIUM SERPL-MCNC: 9.7 MG/DL (ref 8.3–10.6)
CHLORIDE BLD-SCNC: 96 MMOL/L (ref 99–110)
CHOLESTEROL, TOTAL: 167 MG/DL (ref 0–199)
CO2: 27 MMOL/L (ref 21–32)
CREAT SERPL-MCNC: 0.9 MG/DL (ref 0.6–1.2)
ESTIMATED AVERAGE GLUCOSE: 148.5 MG/DL
GFR AFRICAN AMERICAN: >60
GFR NON-AFRICAN AMERICAN: 59
GLUCOSE BLD-MCNC: 107 MG/DL (ref 70–99)
HBA1C MFR BLD: 6.8 %
HDLC SERPL-MCNC: 64 MG/DL (ref 40–60)
LDL CHOLESTEROL CALCULATED: 81 MG/DL
POTASSIUM SERPL-SCNC: 5.3 MMOL/L (ref 3.5–5.1)
SODIUM BLD-SCNC: 138 MMOL/L (ref 136–145)
TOTAL PROTEIN: 6.5 G/DL (ref 6.4–8.2)
TRIGL SERPL-MCNC: 111 MG/DL (ref 0–150)
TSH SERPL DL<=0.05 MIU/L-ACNC: 1.83 UIU/ML (ref 0.27–4.2)
VLDLC SERPL CALC-MCNC: 22 MG/DL

## 2019-08-13 RX ORDER — DULOXETIN HYDROCHLORIDE 60 MG/1
60 CAPSULE, DELAYED RELEASE ORAL DAILY
Qty: 90 CAPSULE | Refills: 1 | Status: SHIPPED | OUTPATIENT
Start: 2019-08-13 | End: 2019-11-26 | Stop reason: SDUPTHER

## 2019-08-13 RX ORDER — INDAPAMIDE 1.25 MG/1
1.25 TABLET, FILM COATED ORAL DAILY
Qty: 90 TABLET | Refills: 1 | Status: SHIPPED | OUTPATIENT
Start: 2019-08-13 | End: 2020-04-21 | Stop reason: ALTCHOICE

## 2019-08-13 RX ORDER — PANTOPRAZOLE SODIUM 40 MG/1
40 TABLET, DELAYED RELEASE ORAL DAILY
Qty: 90 TABLET | Refills: 1 | Status: SHIPPED | OUTPATIENT
Start: 2019-08-13 | End: 2020-04-10

## 2019-08-13 RX ORDER — PRAVASTATIN SODIUM 20 MG
20 TABLET ORAL DAILY
Qty: 90 TABLET | Refills: 1 | Status: SHIPPED | OUTPATIENT
Start: 2019-08-13 | End: 2020-06-02

## 2019-08-13 RX ORDER — FLUTICASONE PROPIONATE 50 MCG
2 SPRAY, SUSPENSION (ML) NASAL DAILY
Qty: 3 BOTTLE | Refills: 0 | Status: SHIPPED | OUTPATIENT
Start: 2019-08-13 | End: 2019-11-26 | Stop reason: SDUPTHER

## 2019-08-15 LAB
ORGANISM: ABNORMAL
URINE CULTURE, ROUTINE: ABNORMAL

## 2019-08-15 RX ORDER — NITROFURANTOIN 25; 75 MG/1; MG/1
100 CAPSULE ORAL 2 TIMES DAILY
Qty: 10 CAPSULE | Refills: 0 | Status: SHIPPED | OUTPATIENT
Start: 2019-08-15 | End: 2019-08-25

## 2019-08-16 ENCOUNTER — TELEPHONE (OUTPATIENT)
Dept: PRIMARY CARE CLINIC | Age: 84
End: 2019-08-16

## 2019-08-16 NOTE — TELEPHONE ENCOUNTER
Per Dr Newberry Sleight should be 1 tablet BID  #20 x 0  Called [place] spoke with [facility] [employer]

## 2019-10-28 ENCOUNTER — OFFICE VISIT (OUTPATIENT)
Dept: PRIMARY CARE CLINIC | Age: 84
End: 2019-10-28
Payer: MEDICARE

## 2019-10-28 VITALS
HEART RATE: 68 BPM | OXYGEN SATURATION: 96 % | DIASTOLIC BLOOD PRESSURE: 82 MMHG | BODY MASS INDEX: 36.18 KG/M2 | SYSTOLIC BLOOD PRESSURE: 144 MMHG | RESPIRATION RATE: 14 BRPM | WEIGHT: 217.4 LBS

## 2019-10-28 DIAGNOSIS — I10 ESSENTIAL HYPERTENSION: Chronic | ICD-10-CM

## 2019-10-28 DIAGNOSIS — R73.9 HYPERGLYCEMIA: ICD-10-CM

## 2019-10-28 DIAGNOSIS — H92.02 LEFT EAR PAIN: ICD-10-CM

## 2019-10-28 DIAGNOSIS — I10 ESSENTIAL HYPERTENSION: Primary | Chronic | ICD-10-CM

## 2019-10-28 LAB
ANION GAP SERPL CALCULATED.3IONS-SCNC: 17 MMOL/L (ref 3–16)
BUN BLDV-MCNC: 18 MG/DL (ref 7–20)
CALCIUM SERPL-MCNC: 9.3 MG/DL (ref 8.3–10.6)
CHLORIDE BLD-SCNC: 96 MMOL/L (ref 99–110)
CO2: 26 MMOL/L (ref 21–32)
CREAT SERPL-MCNC: 0.8 MG/DL (ref 0.6–1.2)
GFR AFRICAN AMERICAN: >60
GFR NON-AFRICAN AMERICAN: >60
GLUCOSE BLD-MCNC: 129 MG/DL (ref 70–99)
POTASSIUM SERPL-SCNC: 4.6 MMOL/L (ref 3.5–5.1)
SODIUM BLD-SCNC: 139 MMOL/L (ref 136–145)

## 2019-10-28 PROCEDURE — 99214 OFFICE O/P EST MOD 30 MIN: CPT | Performed by: FAMILY MEDICINE

## 2019-10-29 LAB
ESTIMATED AVERAGE GLUCOSE: 131.2 MG/DL
HBA1C MFR BLD: 6.2 %

## 2019-11-26 RX ORDER — FLUTICASONE PROPIONATE 50 MCG
SPRAY, SUSPENSION (ML) NASAL
Qty: 48 G | Refills: 4 | Status: SHIPPED | OUTPATIENT
Start: 2019-11-26 | End: 2020-05-22

## 2019-11-26 RX ORDER — DULOXETIN HYDROCHLORIDE 60 MG/1
CAPSULE, DELAYED RELEASE ORAL
Qty: 90 CAPSULE | Refills: 4 | Status: SHIPPED | OUTPATIENT
Start: 2019-11-26 | End: 2021-05-10

## 2019-12-09 ENCOUNTER — OFFICE VISIT (OUTPATIENT)
Dept: PULMONOLOGY | Age: 84
End: 2019-12-09
Payer: MEDICARE

## 2019-12-09 VITALS
WEIGHT: 221 LBS | HEART RATE: 73 BPM | SYSTOLIC BLOOD PRESSURE: 122 MMHG | DIASTOLIC BLOOD PRESSURE: 70 MMHG | HEIGHT: 65 IN | BODY MASS INDEX: 36.82 KG/M2 | OXYGEN SATURATION: 96 %

## 2019-12-09 DIAGNOSIS — K21.9 GASTROESOPHAGEAL REFLUX DISEASE, ESOPHAGITIS PRESENCE NOT SPECIFIED: Chronic | ICD-10-CM

## 2019-12-09 DIAGNOSIS — E66.9 NON MORBID OBESITY, UNSPECIFIED OBESITY TYPE: Chronic | ICD-10-CM

## 2019-12-09 DIAGNOSIS — G47.33 OBSTRUCTIVE SLEEP APNEA: Chronic | ICD-10-CM

## 2019-12-09 DIAGNOSIS — J30.9 ALLERGIC RHINITIS, UNSPECIFIED SEASONALITY, UNSPECIFIED TRIGGER: Chronic | ICD-10-CM

## 2019-12-09 DIAGNOSIS — I10 ESSENTIAL HYPERTENSION: Chronic | ICD-10-CM

## 2019-12-09 PROCEDURE — 99214 OFFICE O/P EST MOD 30 MIN: CPT | Performed by: INTERNAL MEDICINE

## 2019-12-09 ASSESSMENT — SLEEP AND FATIGUE QUESTIONNAIRES
HOW LIKELY ARE YOU TO NOD OFF OR FALL ASLEEP WHILE WATCHING TV: 1
HOW LIKELY ARE YOU TO NOD OFF OR FALL ASLEEP WHEN YOU ARE A PASSENGER IN A CAR FOR AN HOUR WITHOUT A BREAK: 1
HOW LIKELY ARE YOU TO NOD OFF OR FALL ASLEEP WHILE SITTING AND TALKING TO SOMEONE: 0
HOW LIKELY ARE YOU TO NOD OFF OR FALL ASLEEP WHILE SITTING INACTIVE IN A PUBLIC PLACE: 0
ESS TOTAL SCORE: 3
HOW LIKELY ARE YOU TO NOD OFF OR FALL ASLEEP WHILE LYING DOWN TO REST IN THE AFTERNOON WHEN CIRCUMSTANCES PERMIT: 0
HOW LIKELY ARE YOU TO NOD OFF OR FALL ASLEEP IN A CAR, WHILE STOPPED FOR A FEW MINUTES IN TRAFFIC: 0
HOW LIKELY ARE YOU TO NOD OFF OR FALL ASLEEP WHILE SITTING QUIETLY AFTER LUNCH WITHOUT ALCOHOL: 0
HOW LIKELY ARE YOU TO NOD OFF OR FALL ASLEEP WHILE SITTING AND READING: 1

## 2019-12-09 ASSESSMENT — ENCOUNTER SYMPTOMS
RHINORRHEA: 0
CHOKING: 0
SHORTNESS OF BREATH: 0
COUGH: 0
APNEA: 0

## 2020-03-19 RX ORDER — AMLODIPINE BESYLATE AND BENAZEPRIL HYDROCHLORIDE 5; 40 MG/1; MG/1
CAPSULE ORAL
Qty: 90 CAPSULE | Refills: 3 | Status: SHIPPED | OUTPATIENT
Start: 2020-03-19 | End: 2020-05-22 | Stop reason: SINTOL

## 2020-03-19 NOTE — TELEPHONE ENCOUNTER
Medication:   Requested Prescriptions     Pending Prescriptions Disp Refills    amLODIPine-benazepril (LOTREL) 5-40 MG per capsule [Pharmacy Med Name: AMLODIPINE/BENAZEPRIL CAPS 5/40MG] 90 capsule 3     Sig: TAKE 1 CAPSULE DAILY       Last Filled:      Patient Phone Number: [phone] (home) [phone] (work)    Last appt: 10/28/2019   Next appt: Visit date not found    Last BMP:   Lab Results   Component Value Date     10/28/2019    K 4.6 10/28/2019    CL 96 10/28/2019    CO2 26 10/28/2019    ANIONGAP 17 10/28/2019    GLUCOSE 129 10/28/2019    GLUCOSE 97 05/03/2012    BUN 18 10/28/2019    CREATININE 0.8 10/28/2019    LABGLOM >60 10/28/2019    LABGLOM 66 05/03/2012    LABGLOM 54 05/03/2012    GFRAA >60 10/28/2019    GFRAA >60 06/11/2013    CALCIUM 9.3 10/28/2019      Last CMP:   Lab Results   Component Value Date     10/28/2019    K 4.6 10/28/2019    CL 96 10/28/2019    CO2 26 10/28/2019    ANIONGAP 17 10/28/2019    GLUCOSE 129 10/28/2019    GLUCOSE 97 05/03/2012    BUN 18 10/28/2019    CREATININE 0.8 10/28/2019    LABGLOM >60 10/28/2019    LABGLOM 66 05/03/2012    LABGLOM 54 05/03/2012    GFRAA >60 10/28/2019    GFRAA >60 06/11/2013    PROT 6.5 08/12/2019    PROT 6.9 12/04/2012    LABALBU 4.4 08/12/2019    LABALBU 3.8 07/20/2010    AGRATIO 2.1 05/21/2015    BILITOT 0.9 08/12/2019    ALKPHOS 46 08/12/2019    ALT 29 08/12/2019    AST 22 08/12/2019    GLOB 2.1 05/21/2015     Last Renal Function:   Lab Results   Component Value Date     10/28/2019    K 4.6 10/28/2019    CL 96 10/28/2019    CO2 26 10/28/2019    GLUCOSE 129 10/28/2019    GLUCOSE 97 05/03/2012    BUN 18 10/28/2019    CREATININE 0.8 10/28/2019    LABALBU 4.4 08/12/2019    LABALBU 3.8 07/20/2010    CALCIUM 9.3 10/28/2019    GFR >60 06/11/2013    GFRAA >60 10/28/2019    GFRAA >60 06/11/2013       Last OARRS:   RX Monitoring 6/3/2019   Attestation The Prescription Monitoring Report for this patient was reviewed today.    Periodic Controlled Substance Monitoring -       Preferred Pharmacy:   [facility] [address], [place] [phone] Triny Maynard [phone]  [address] [place] [address]  Phone: [phone] Fax: [phone] - Kathy Marroquin, [facility] [address] [phone] - F [phone]  [place] [address]  Phone: [phone] Fax: [phone]

## 2020-03-20 ENCOUNTER — TELEPHONE (OUTPATIENT)
Dept: PRIMARY CARE CLINIC | Age: 85
End: 2020-03-20

## 2020-03-20 RX ORDER — FUROSEMIDE 20 MG/1
20 TABLET ORAL DAILY
Qty: 10 TABLET | Refills: 0 | Status: SHIPPED | OUTPATIENT
Start: 2020-03-20 | End: 2020-04-03 | Stop reason: ALTCHOICE

## 2020-03-20 NOTE — TELEPHONE ENCOUNTER
Spoke with the patient  Denies any redness or tenderness at the calvaria no chest pain or shortness of breath  It is mainly around the ankle goes up a little bit  Recently was diagnosed with infection in her toes was seen by podiatrist finish the course of antibiotic  I will send Lasix discussed with the patient elevating legs while sitting if not better by Monday she will give me a call

## 2020-03-23 ENCOUNTER — TELEPHONE (OUTPATIENT)
Dept: PRIMARY CARE CLINIC | Age: 85
End: 2020-03-23

## 2020-03-23 NOTE — TELEPHONE ENCOUNTER
She can continue 2 more days taking  Lasix  Continue to monitor blood pressure  If not better needs to be seen

## 2020-03-23 NOTE — TELEPHONE ENCOUNTER
Pt called and stated that her legs and foot swelling is going down a little since she took her RX. Her bp is 150/83.     Please call the house phone first and if she isn't there call her cell at: [phone]

## 2020-04-02 ENCOUNTER — TELEPHONE (OUTPATIENT)
Dept: PRIMARY CARE CLINIC | Age: 85
End: 2020-04-02

## 2020-04-02 RX ORDER — PHENAZOPYRIDINE HYDROCHLORIDE 100 MG/1
100 TABLET, FILM COATED ORAL 3 TIMES DAILY PRN
Qty: 9 TABLET | Refills: 0 | Status: SHIPPED | OUTPATIENT
Start: 2020-04-02 | End: 2020-04-04

## 2020-04-02 NOTE — TELEPHONE ENCOUNTER
Patient called and stated that she is currently having a UTI and needs medication called in. She also stated that she is swelling again after finishing her water pills that you gave her. She said call this in to [facility] [place].

## 2020-04-03 ENCOUNTER — OFFICE VISIT (OUTPATIENT)
Dept: PRIMARY CARE CLINIC | Age: 85
End: 2020-04-03
Payer: MEDICARE

## 2020-04-03 ENCOUNTER — HOSPITAL ENCOUNTER (OUTPATIENT)
Dept: VASCULAR LAB | Age: 85
Discharge: HOME | End: 2020-04-03
Payer: MEDICARE

## 2020-04-03 ENCOUNTER — TELEPHONE (OUTPATIENT)
Dept: PRIMARY CARE CLINIC | Age: 85
End: 2020-04-03

## 2020-04-03 VITALS
OXYGEN SATURATION: 97 % | BODY MASS INDEX: 37.44 KG/M2 | RESPIRATION RATE: 12 BRPM | WEIGHT: 225 LBS | DIASTOLIC BLOOD PRESSURE: 80 MMHG | HEART RATE: 85 BPM | SYSTOLIC BLOOD PRESSURE: 130 MMHG

## 2020-04-03 LAB
BILIRUBIN, POC: ABNORMAL
BLOOD URINE, POC: ABNORMAL
CLARITY, POC: ABNORMAL
COLOR, POC: ABNORMAL
GLUCOSE URINE, POC: 100
KETONES, POC: ABNORMAL
LEUKOCYTE EST, POC: ABNORMAL
NITRITE, POC: POSITIVE
PH, POC: 6.5
PROTEIN, POC: 100
SPECIFIC GRAVITY, POC: 1.01
UROBILINOGEN, POC: 4

## 2020-04-03 PROCEDURE — 99214 OFFICE O/P EST MOD 30 MIN: CPT | Performed by: FAMILY MEDICINE

## 2020-04-03 PROCEDURE — 81002 URINALYSIS NONAUTO W/O SCOPE: CPT | Performed by: FAMILY MEDICINE

## 2020-04-03 PROCEDURE — 93971 EXTREMITY STUDY: CPT

## 2020-04-03 RX ORDER — SULFAMETHOXAZOLE AND TRIMETHOPRIM 800; 160 MG/1; MG/1
1 TABLET ORAL 2 TIMES DAILY
Qty: 14 TABLET | Refills: 0 | Status: SHIPPED | OUTPATIENT
Start: 2020-04-03 | End: 2020-04-10

## 2020-04-03 RX ORDER — CLOTRIMAZOLE AND BETAMETHASONE DIPROPIONATE 10; .64 MG/G; MG/G
CREAM TOPICAL
COMMUNITY
Start: 2020-03-03 | End: 2020-06-08

## 2020-04-03 ASSESSMENT — PATIENT HEALTH QUESTIONNAIRE - PHQ9
SUM OF ALL RESPONSES TO PHQ QUESTIONS 1-9: 0
SUM OF ALL RESPONSES TO PHQ9 QUESTIONS 1 & 2: 0
2. FEELING DOWN, DEPRESSED OR HOPELESS: 0
1. LITTLE INTEREST OR PLEASURE IN DOING THINGS: 0
SUM OF ALL RESPONSES TO PHQ QUESTIONS 1-9: 0

## 2020-04-03 ASSESSMENT — ENCOUNTER SYMPTOMS
EYES NEGATIVE: 1
RESPIRATORY NEGATIVE: 1
GASTROINTESTINAL NEGATIVE: 1

## 2020-04-05 LAB
ORGANISM: ABNORMAL
URINE CULTURE, ROUTINE: ABNORMAL

## 2020-04-06 RX ORDER — CIPROFLOXACIN 500 MG/1
500 TABLET, FILM COATED ORAL 2 TIMES DAILY
Qty: 14 TABLET | Refills: 0 | Status: SHIPPED | OUTPATIENT
Start: 2020-04-06 | End: 2020-04-13

## 2020-04-10 RX ORDER — CELECOXIB 200 MG/1
CAPSULE ORAL
Qty: 90 CAPSULE | Refills: 3 | Status: SHIPPED | OUTPATIENT
Start: 2020-04-10 | End: 2020-05-22

## 2020-04-10 RX ORDER — PANTOPRAZOLE SODIUM 40 MG/1
TABLET, DELAYED RELEASE ORAL
Qty: 90 TABLET | Refills: 3 | Status: SHIPPED | OUTPATIENT
Start: 2020-04-10 | End: 2021-05-27

## 2020-04-10 NOTE — TELEPHONE ENCOUNTER
Medication:   Requested Prescriptions     Pending Prescriptions Disp Refills    pantoprazole (PROTONIX) 40 MG tablet [Pharmacy Med Name: PANTOPRAZOLE SOD DR TABS 40MG] 90 tablet 3     Sig: TAKE 1 TABLET DAILY    celecoxib (CELEBREX) 200 MG capsule [Pharmacy Med Name: CELECOXIB CAPS 200MG] 90 capsule 3     Sig: TAKE 1 CAPSULE DAILY     Last Filled:  8/13/19 8/12/19    Last appt: 4/3/2020   Next appt: Visit date not found    Last OARRS:   RX Monitoring 6/3/2019   Attestation The Prescription Monitoring Report for this patient was reviewed today.    Periodic Controlled Substance Monitoring -

## 2020-04-20 ENCOUNTER — NURSE TRIAGE (OUTPATIENT)
Dept: OTHER | Facility: CLINIC | Age: 85
End: 2020-04-20

## 2020-04-20 ENCOUNTER — TELEPHONE (OUTPATIENT)
Dept: PRIMARY CARE CLINIC | Age: 85
End: 2020-04-20

## 2020-04-20 NOTE — TELEPHONE ENCOUNTER
Patient call transferred from NT, due to swelling in the right leg which is also a side affect of the meds. She was taking. Patient did not want to do a vv would rather be seen in the office if possible.

## 2020-04-21 ENCOUNTER — OFFICE VISIT (OUTPATIENT)
Dept: PRIMARY CARE CLINIC | Age: 85
End: 2020-04-21
Payer: MEDICARE

## 2020-04-21 VITALS
RESPIRATION RATE: 16 BRPM | SYSTOLIC BLOOD PRESSURE: 152 MMHG | OXYGEN SATURATION: 96 % | BODY MASS INDEX: 37.67 KG/M2 | WEIGHT: 226.4 LBS | DIASTOLIC BLOOD PRESSURE: 78 MMHG | HEART RATE: 87 BPM

## 2020-04-21 PROCEDURE — 99214 OFFICE O/P EST MOD 30 MIN: CPT | Performed by: FAMILY MEDICINE

## 2020-04-21 RX ORDER — FUROSEMIDE 20 MG/1
20 TABLET ORAL DAILY
Qty: 30 TABLET | Refills: 1 | Status: SHIPPED | OUTPATIENT
Start: 2020-04-21 | End: 2020-05-22 | Stop reason: SDUPTHER

## 2020-04-21 ASSESSMENT — ENCOUNTER SYMPTOMS
CHOKING: 0
APNEA: 0
GASTROINTESTINAL NEGATIVE: 1
STRIDOR: 0
SHORTNESS OF BREATH: 0
WHEEZING: 0
COUGH: 0
CHEST TIGHTNESS: 0
EYES NEGATIVE: 1

## 2020-04-21 NOTE — PATIENT INSTRUCTIONS
sodium\" may still have too much sodium. Be sure to read the label to see how much sodium you are getting. · Buy fresh vegetables, or frozen vegetables without added sauces. Buy low-sodium versions of canned vegetables, soups, and other canned goods. Prepare low-sodium meals  · Cut back on the amount of salt you use in cooking. This will help you adjust to the taste. Do not add salt after cooking. One teaspoon of salt has about 2,300 mg of sodium. · Take the salt shaker off the table. · Flavor your food with garlic, lemon juice, onion, vinegar, herbs, and spices. Do not use soy sauce, lite soy sauce, steak sauce, onion salt, garlic salt, celery salt, mustard, or ketchup on your food. · Use low-sodium salad dressings, sauces, and ketchup. Or make your own salad dressings and sauces without adding salt. · Use less salt (or none) when recipes call for it. You can often use half the salt a recipe calls for without losing flavor. Other foods such as rice, pasta, and grains do not need added salt. · Rinse canned vegetables, and cook them in fresh water. This removes some--but not all--of the salt. · Avoid water that is naturally high in sodium or that has been treated with water softeners, which add sodium. Call your local water company to find out the sodium content of your water supply. If you buy bottled water, read the label and choose a sodium-free brand. Avoid high-sodium foods  · Avoid eating:  ? Smoked, cured, salted, and canned meat, fish, and poultry. ? Ham, goldstein, hot dogs, and luncheon meats. ? Regular, hard, and processed cheese and regular peanut butter. ? Crackers with salted tops, and other salted snack foods such as pretzels, chips, and salted popcorn. ? Frozen prepared meals, unless labeled low-sodium. ? Canned and dried soups, broths, and bouillon, unless labeled sodium-free or low-sodium. ? Canned vegetables, unless labeled sodium-free or low-sodium. ?  Western Natalie fries, pizza, tacos, and other fast foods. ? Pickles, olives, ketchup, and other condiments, especially soy sauce, unless labeled sodium-free or low-sodium. Where can you learn more? Go to https://chpeelvia.real trends. org and sign in to your TripIt account. Enter G816 in the [facility] box to learn more about \"Low Sodium Diet (2,000 Milligram): Care Instructions. \"     If you do not have an account, please click on the \"Sign Up Now\" link. Current as of: August 21, 2019Content Version: 12.4  © 9213-2477 [employer]. Care instructions adapted under license by [facility] ([facility]). If you have questions about a medical condition or this instruction, always ask your healthcare professional. [address] any warranty or liability for your use of this information. Patient Education        Learning About Low-Sodium Foods  What foods are low in sodium? The foods you eat contain nutrients, such as vitamins and minerals. Sodium is a nutrient. Your body needs the right amount to stay healthy and work as it should. You can use the list below to help you make choices about which foods to eat.   Fruits  · Fresh, frozen, canned, or dried fruit  Vegetables  · Fresh or frozen vegetables, with no added salt  · Canned vegetables, low-sodium or with no added salt  Grains  · Bagels without salted tops  · Cereal with no added salt  · Corn tortillas  · Crackers with no added salt  · Oatmeal, cooked without salt  · Popcorn with no salt  · Pasta and noodles, cooked without salt  · Rice, cooked without salt  · Unsalted pretzels  Dairy and dairy alternatives  · Butter, unsalted  · Cream cheese  · Ice cream  · Milk  · Soy milk  Meats and other protein foods  · Beans and peas, canned with no salt  · Eggs  · Fresh fish (not smoked)  · Fresh meats (not smoked or cured)  · Nuts and nut butter, prepared without salt  · Poultry, not packaged with sodium solution  · Tofu, unseasoned  · Tuna, canned without A serving is 1 tablespoon jelly or jam, ½ cup sorbet, or 1 cup of lemonade. · Eat less than 2,300 milligrams (mg) of sodium a day. If you limit your sodium to 1,500 mg a day, you can lower your blood pressure even more. Tips for success  · Start small. Do not try to make dramatic changes to your diet all at once. You might feel that you are missing out on your favorite foods and then be more likely to not follow the plan. Make small changes, and stick with them. Once those changes become habit, add a few more changes. · Try some of the following:  ? Make it a goal to eat a fruit or vegetable at every meal and at snacks. This will make it easy to get the recommended amount of fruits and vegetables each day. ? Try yogurt topped with fruit and nuts for a snack or healthy dessert. ? Add lettuce, tomato, cucumber, and onion to sandwiches. ? Combine a ready-made pizza crust with low-fat mozzarella cheese and lots of vegetable toppings. Try using tomatoes, squash, spinach, broccoli, carrots, cauliflower, and onions. ? Have a variety of cut-up vegetables with a low-fat dip as an appetizer instead of chips and dip. ? Sprinkle sunflower seeds or chopped almonds over salads. Or try adding chopped walnuts or almonds to cooked vegetables. ? Try some vegetarian meals using beans and peas. Add garbanzo or kidney beans to salads. Make burritos and tacos with mashed moreno beans or black beans. Where can you learn more? Go to https://iota Computingxavier.Public Good Software. org and sign in to your [employer] account. Enter W962 in the [facility] box to learn more about \"DASH Diet: Care Instructions. \"     If you do not have an account, please click on the \"Sign Up Now\" link. Current as of: December 15, 2019Content Version: 12.4  © 3252-5071 [employer] Incorporated. Care instructions adapted under license by [facility] ([facility]).  If you have questions about a medical condition or this instruction, always ask your healthcare

## 2020-04-24 ENCOUNTER — TELEPHONE (OUTPATIENT)
Dept: PRIMARY CARE CLINIC | Age: 85
End: 2020-04-24

## 2020-04-30 ENCOUNTER — VIRTUAL VISIT (OUTPATIENT)
Dept: PRIMARY CARE CLINIC | Age: 85
End: 2020-04-30
Payer: MEDICARE

## 2020-04-30 VITALS
HEART RATE: 80 BPM | TEMPERATURE: 98 F | SYSTOLIC BLOOD PRESSURE: 136 MMHG | RESPIRATION RATE: 16 BRPM | DIASTOLIC BLOOD PRESSURE: 80 MMHG | WEIGHT: 227 LBS | BODY MASS INDEX: 37.77 KG/M2 | OXYGEN SATURATION: 98 %

## 2020-04-30 PROCEDURE — 99213 OFFICE O/P EST LOW 20 MIN: CPT | Performed by: FAMILY MEDICINE

## 2020-04-30 RX ORDER — AMOXICILLIN AND CLAVULANATE POTASSIUM 875; 125 MG/1; MG/1
1 TABLET, FILM COATED ORAL 2 TIMES DAILY
Qty: 14 TABLET | Refills: 0 | Status: SHIPPED | OUTPATIENT
Start: 2020-04-30 | End: 2020-05-07

## 2020-04-30 SDOH — ECONOMIC STABILITY: INCOME INSECURITY: HOW HARD IS IT FOR YOU TO PAY FOR THE VERY BASICS LIKE FOOD, HOUSING, MEDICAL CARE, AND HEATING?: NOT HARD AT ALL

## 2020-04-30 SDOH — ECONOMIC STABILITY: TRANSPORTATION INSECURITY
IN THE PAST 12 MONTHS, HAS THE LACK OF TRANSPORTATION KEPT YOU FROM MEDICAL APPOINTMENTS OR FROM GETTING MEDICATIONS?: NO

## 2020-04-30 SDOH — ECONOMIC STABILITY: TRANSPORTATION INSECURITY
IN THE PAST 12 MONTHS, HAS LACK OF TRANSPORTATION KEPT YOU FROM MEETINGS, WORK, OR FROM GETTING THINGS NEEDED FOR DAILY LIVING?: NO

## 2020-04-30 SDOH — ECONOMIC STABILITY: FOOD INSECURITY: WITHIN THE PAST 12 MONTHS, YOU WORRIED THAT YOUR FOOD WOULD RUN OUT BEFORE YOU GOT MONEY TO BUY MORE.: NEVER TRUE

## 2020-04-30 SDOH — ECONOMIC STABILITY: FOOD INSECURITY: WITHIN THE PAST 12 MONTHS, THE FOOD YOU BOUGHT JUST DIDN'T LAST AND YOU DIDN'T HAVE MONEY TO GET MORE.: NEVER TRUE

## 2020-04-30 ASSESSMENT — ENCOUNTER SYMPTOMS
APNEA: 0
STRIDOR: 0
COUGH: 0
CHOKING: 0
GASTROINTESTINAL NEGATIVE: 1
SHORTNESS OF BREATH: 0
EYES NEGATIVE: 1
WHEEZING: 0
CHEST TIGHTNESS: 0

## 2020-04-30 NOTE — PROGRESS NOTES
Medical: No     Non-medical: No   Tobacco Use    Smoking status: Never Smoker    Smokeless tobacco: Never Used   Substance and Sexual Activity    Alcohol use: Yes     Comment: very rare occasions    Drug use: No    Sexual activity: Yes     Partners: Male   Lifestyle    Physical activity     Days per week: None     Minutes per session: None    Stress: None   Relationships    Social connections     Talks on phone: None     Gets together: None     Attends Restoration service: None     Active member of club or organization: None     Attends meetings of clubs or organizations: None     Relationship status: None    Intimate partner violence     Fear of current or ex partner: None     Emotionally abused: None     Physically abused: None     Forced sexual activity: None   Other Topics Concern    None   Social History Narrative    None     Current Outpatient Medications   Medication Sig Dispense Refill    furosemide (LASIX) 20 MG tablet Take 1 tablet by mouth daily 30 tablet 1    pantoprazole (PROTONIX) 40 MG tablet TAKE 1 TABLET DAILY 90 tablet 3    celecoxib (CELEBREX) 200 MG capsule TAKE 1 CAPSULE DAILY 90 capsule 3    amLODIPine-benazepril (LOTREL) 5-40 MG per capsule TAKE 1 CAPSULE DAILY 90 capsule 3    fluticasone (FLONASE) 50 MCG/ACT nasal spray USE 2 SPRAYS NASALLY DAILY 48 g 4    DULoxetine (CYMBALTA) 60 MG extended release capsule TAKE 1 CAPSULE DAILY 90 capsule 4    pravastatin (PRAVACHOL) 20 MG tablet Take 1 tablet by mouth daily 90 tablet 1    metoprolol tartrate (LOPRESSOR) 25 MG tablet Take 1 tablet by mouth 2 times daily 180 tablet 1    ergocalciferol (ERGOCALCIFEROL) 51849 units capsule Take 50,000 Units by mouth      Calcium Polycarbophil (FIBER-CAPS PO) Take by mouth      Multiple Vitamins-Minerals (VISION VITAMINS PO) Take by mouth      vitamin D (ERGOCALCIFEROL) 79137 units CAPS capsule       Cholecalciferol (VITAMIN D3) 30617 units CAPS Take by mouth      meclizine (ANTIVERT) 25

## 2020-05-13 ENCOUNTER — TELEPHONE (OUTPATIENT)
Dept: PRIMARY CARE CLINIC | Age: 85
End: 2020-05-13

## 2020-05-13 ENCOUNTER — OFFICE VISIT (OUTPATIENT)
Dept: PRIMARY CARE CLINIC | Age: 85
End: 2020-05-13

## 2020-05-14 LAB — SARS-COV-2: NOT DETECTED

## 2020-05-14 NOTE — RESULT ENCOUNTER NOTE
Please contact patient with their testing results: Your test for COVID-19, also known as novel coronavirus, came back negative. No virus was detected from the sample collected. Until your symptoms are fully resolved, you may still be contagious. We recommend that you remain isolated for 7 days minimum or 72 hours after your symptoms have completely resolved, whichever is longer. Continually monitor symptoms. Contact a medical provider if symptoms are worsening. If you have any additional questions, contact your PCP.     For additional information, please visit the Centers for Disease Control and Prevention ([email or web address]

## 2020-05-19 LAB
HCT VFR BLD CALC: 39.3 % (ref 35–45)
HEMOGLOBIN: 13.6 G/DL (ref 11.7–15.5)
LV EF: 58 %
LVEF MODALITY: NORMAL
MCH RBC QN AUTO: 32.9 PG (ref 27–33)
MCHC RBC AUTO-ENTMCNC: 34.5 G/DL (ref 32–36)
MCV RBC AUTO: 95.3 FL (ref 80–100)
PDW BLD-RTO: 13.5 % (ref 11–15)
PLATELET # BLD: 212 10E3/UL (ref 140–400)
PMV BLD AUTO: 8.9 FL (ref 7.5–11.5)
RBC # BLD: 4.13 10E6/UL (ref 3.8–5.1)
WBC # BLD: 6.8 10E3/UL (ref 3.8–10.8)

## 2020-05-21 ENCOUNTER — TELEPHONE (OUTPATIENT)
Dept: PULMONOLOGY | Age: 85
End: 2020-05-21

## 2020-05-21 ENCOUNTER — TELEPHONE (OUTPATIENT)
Dept: PRIMARY CARE CLINIC | Age: 85
End: 2020-05-21

## 2020-05-22 ENCOUNTER — OFFICE VISIT (OUTPATIENT)
Dept: PRIMARY CARE CLINIC | Age: 85
End: 2020-05-22
Payer: MEDICARE

## 2020-05-22 VITALS
HEART RATE: 78 BPM | RESPIRATION RATE: 14 BRPM | SYSTOLIC BLOOD PRESSURE: 136 MMHG | WEIGHT: 227 LBS | BODY MASS INDEX: 37.77 KG/M2 | OXYGEN SATURATION: 96 % | DIASTOLIC BLOOD PRESSURE: 78 MMHG | TEMPERATURE: 98.1 F

## 2020-05-22 LAB
ANION GAP SERPL CALCULATED.3IONS-SCNC: 11 MMOL/L (ref 3–16)
BUN BLDV-MCNC: 14 MG/DL (ref 7–20)
CALCIUM SERPL-MCNC: 9.1 MG/DL (ref 8.3–10.6)
CHLORIDE BLD-SCNC: 101 MMOL/L (ref 99–110)
CO2: 26 MMOL/L (ref 21–32)
CREAT SERPL-MCNC: 0.8 MG/DL (ref 0.6–1.2)
GFR AFRICAN AMERICAN: >60
GFR NON-AFRICAN AMERICAN: >60
GLUCOSE BLD-MCNC: 114 MG/DL (ref 70–99)
HBA1C MFR BLD: 6.7 %
POTASSIUM SERPL-SCNC: 4.2 MMOL/L (ref 3.5–5.1)
SODIUM BLD-SCNC: 138 MMOL/L (ref 136–145)

## 2020-05-22 PROCEDURE — 83036 HEMOGLOBIN GLYCOSYLATED A1C: CPT | Performed by: FAMILY MEDICINE

## 2020-05-22 PROCEDURE — 99213 OFFICE O/P EST LOW 20 MIN: CPT | Performed by: FAMILY MEDICINE

## 2020-05-22 RX ORDER — BENAZEPRIL HYDROCHLORIDE 40 MG/1
40 TABLET, FILM COATED ORAL EVERY EVENING
Qty: 30 TABLET | Refills: 3 | Status: SHIPPED | OUTPATIENT
Start: 2020-05-22 | End: 2020-07-27 | Stop reason: SDUPTHER

## 2020-05-22 RX ORDER — FUROSEMIDE 20 MG/1
20 TABLET ORAL EVERY MORNING
Qty: 30 TABLET | Refills: 3 | Status: SHIPPED | OUTPATIENT
Start: 2020-05-22 | End: 2020-08-07

## 2020-05-22 ASSESSMENT — ENCOUNTER SYMPTOMS
GASTROINTESTINAL NEGATIVE: 1
EYES NEGATIVE: 1
RESPIRATORY NEGATIVE: 1

## 2020-05-22 NOTE — PROGRESS NOTES
SUBJECTIVE:  Patient ID: Lima Velásquez is a 80 y.o. y.o. female     HPI   Edema: Patient is here for follow up on edema. The location of the edema both sides worse is ankle(s) right, up to the knee level,  The edema has been mild.  Onset of symptoms was several days ago, gradually worsening since that time. The edema is present all day. The patient states never.  The swelling has been aggravated by dependency of involved area and Patient with fracture on the right foot she is followed by podiatry, relieved by nothing, and been associated with nothing. Cardiac risk factors include advanced age (older than 54 for men, 72 for women), obesity (BMI >= 30 kg/m2) and sedentary lifestyle. He thinks Lasix had helped some  She finished antibiotic    Echocardiogram was done within normal limit  Doppler was done last visit was normal no DVT. Hypertension: Patient here for follow-up of elevated blood pressure. She is not exercising and is adherent to low salt diet.  Blood pressure is well controlled at home. Cardiac symptoms none. Patient denies chest pain, chest pressure/discomfort, claudication, dyspnea, exertional chest pressure/discomfort, fatigue, irregular heart beat, lower extremity edema, near-syncope, orthopnea, palpitations, paroxysmal nocturnal dyspnea and syncope.  Cardiovascular risk factors: advanced age (older than 54 for men, 72 for women), dyslipidemia, hypertension and obesity (BMI >= 30 kg/m2). Use of agents associated with hypertension: none. History of target organ damage: none.   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    Carcinoma nos     Chronic back pain     GERD (gastroesophageal reflux disease)     Hypercholesterolemia     Hyperlipidemia     Hypertension     Menopause age unsure    Non morbid Partners: Male   Lifestyle    Physical activity     Days per week: None     Minutes per session: None    Stress: None   Relationships    Social connections     Talks on phone: None     Gets together: None     Attends Yazdanism service: None     Active member of club or organization: None     Attends meetings of clubs or organizations: None     Relationship status: None    Intimate partner violence     Fear of current or ex partner: None     Emotionally abused: None     Physically abused: None     Forced sexual activity: None   Other Topics Concern    None   Social History Narrative    None     Current Outpatient Medications   Medication Sig Dispense Refill    furosemide (LASIX) 20 MG tablet Take 1 tablet by mouth daily 30 tablet 1    pantoprazole (PROTONIX) 40 MG tablet TAKE 1 TABLET DAILY 90 tablet 3    DULoxetine (CYMBALTA) 60 MG extended release capsule TAKE 1 CAPSULE DAILY 90 capsule 4    pravastatin (PRAVACHOL) 20 MG tablet Take 1 tablet by mouth daily 90 tablet 1    metoprolol tartrate (LOPRESSOR) 25 MG tablet Take 1 tablet by mouth 2 times daily 180 tablet 1    modafinil (PROVIGIL) 200 MG tablet Take 200 mg by mouth daily.  ergocalciferol (ERGOCALCIFEROL) 92710 units capsule Take 50,000 Units by mouth      Calcium Polycarbophil (FIBER-CAPS PO) Take by mouth      Multiple Vitamins-Minerals (VISION VITAMINS PO) Take by mouth      meclizine (ANTIVERT) 25 MG tablet Take 25 mg by mouth 2 times daily.  celecoxib (CELEBREX) 200 MG capsule TAKE 1 CAPSULE DAILY 90 capsule 3    clotrimazole-betamethasone (LOTRISONE) 1-0.05 % cream Apply to affected areas daily      fluticasone (FLONASE) 50 MCG/ACT nasal spray USE 2 SPRAYS NASALLY DAILY (Patient not taking: Reported on 5/22/2020) 48 g 4    vitamin D (ERGOCALCIFEROL) 03659 units CAPS capsule       Cholecalciferol (VITAMIN D3) 69014 units CAPS Take by mouth       No current facility-administered medications for this visit.       Allergies pressure  Discussed other etiology for edema   If not better may need further testing  CT scan abdomen and pelvis/arterial Doppler study may be seeing a vascular surgeon.

## 2020-05-23 LAB
ESTIMATED AVERAGE GLUCOSE: 145.6 MG/DL
HBA1C MFR BLD: 6.7 %

## 2020-05-25 ENCOUNTER — TELEPHONE (OUTPATIENT)
Dept: FAMILY MEDICINE CLINIC | Age: 85
End: 2020-05-25

## 2020-05-25 RX ORDER — NITROFURANTOIN 25; 75 MG/1; MG/1
100 CAPSULE ORAL 2 TIMES DAILY
Qty: 10 CAPSULE | Refills: 0 | Status: SHIPPED | OUTPATIENT
Start: 2020-05-25 | End: 2020-05-30

## 2020-05-25 RX ORDER — PHENAZOPYRIDINE HYDROCHLORIDE 200 MG/1
200 TABLET, FILM COATED ORAL 3 TIMES DAILY PRN
Qty: 6 TABLET | Refills: 0 | Status: SHIPPED | OUTPATIENT
Start: 2020-05-25 | End: 2020-05-27

## 2020-05-25 NOTE — TELEPHONE ENCOUNTER
Had a UTI last month- felt better after medication (Cipro)  Denies symptoms of UTI on Friday; symptoms worse Saturday  Reports she can tell when she has one- a lot of dysuria; denies fevers or flank pain  Took a couple doses of pyridium  + drinking fluids  Wants to Bailee Butler- discussed risks and benefits with patient  Discussed if symptoms worsening patient to call her PCP.

## 2020-06-02 RX ORDER — PRAVASTATIN SODIUM 20 MG
TABLET ORAL
Qty: 90 TABLET | Refills: 3 | Status: SHIPPED | OUTPATIENT
Start: 2020-06-02 | End: 2021-05-27

## 2020-06-05 ENCOUNTER — TELEPHONE (OUTPATIENT)
Dept: PULMONOLOGY | Age: 85
End: 2020-06-05

## 2020-06-08 ENCOUNTER — VIRTUAL VISIT (OUTPATIENT)
Dept: PULMONOLOGY | Age: 85
End: 2020-06-08
Payer: MEDICARE

## 2020-06-08 PROCEDURE — 99213 OFFICE O/P EST LOW 20 MIN: CPT | Performed by: INTERNAL MEDICINE

## 2020-06-08 ASSESSMENT — SLEEP AND FATIGUE QUESTIONNAIRES
HOW LIKELY ARE YOU TO NOD OFF OR FALL ASLEEP WHILE SITTING INACTIVE IN A PUBLIC PLACE: 0
HOW LIKELY ARE YOU TO NOD OFF OR FALL ASLEEP WHILE SITTING AND READING: 0
HOW LIKELY ARE YOU TO NOD OFF OR FALL ASLEEP WHEN YOU ARE A PASSENGER IN A CAR FOR AN HOUR WITHOUT A BREAK: 0
HOW LIKELY ARE YOU TO NOD OFF OR FALL ASLEEP WHILE SITTING QUIETLY AFTER LUNCH WITHOUT ALCOHOL: 0
HOW LIKELY ARE YOU TO NOD OFF OR FALL ASLEEP WHILE WATCHING TV: 1
HOW LIKELY ARE YOU TO NOD OFF OR FALL ASLEEP WHILE LYING DOWN TO REST IN THE AFTERNOON WHEN CIRCUMSTANCES PERMIT: 0
HOW LIKELY ARE YOU TO NOD OFF OR FALL ASLEEP IN A CAR, WHILE STOPPED FOR A FEW MINUTES IN TRAFFIC: 0
HOW LIKELY ARE YOU TO NOD OFF OR FALL ASLEEP WHILE SITTING AND TALKING TO SOMEONE: 0
ESS TOTAL SCORE: 1

## 2020-06-08 NOTE — PROGRESS NOTES
tablet 3    furosemide (LASIX) 20 MG tablet Take 1 tablet by mouth every morning 30 tablet 3    pantoprazole (PROTONIX) 40 MG tablet TAKE 1 TABLET DAILY 90 tablet 3    DULoxetine (CYMBALTA) 60 MG extended release capsule TAKE 1 CAPSULE DAILY 90 capsule 4    metoprolol tartrate (LOPRESSOR) 25 MG tablet Take 1 tablet by mouth 2 times daily 180 tablet 1    modafinil (PROVIGIL) 200 MG tablet Take 200 mg by mouth daily.  ergocalciferol (ERGOCALCIFEROL) 03971 units capsule Take 50,000 Units by mouth      Calcium Polycarbophil (FIBER-CAPS PO) Take by mouth      Multiple Vitamins-Minerals (VISION VITAMINS PO) Take by mouth      meclizine (ANTIVERT) 25 MG tablet Take 25 mg by mouth 2 times daily. No current facility-administered medications for this visit.         Allergies as of 06/08/2020 - Review Complete 06/08/2020   Allergen Reaction Noted    Lodine [etodolac] Rash 07/07/2010       Patient Active Problem List   Diagnosis    Malignant neoplasm of breast ([facility] [address]    Hyperlipidemia    Essential hypertension    Hypercholesterolemia    Rectocele    Arthritis    Spinal stenosis of lumbar region    Numbness and tingling of both legs    GERD (gastroesophageal reflux disease)    Neuropathy    B12 deficiency    Spinal stenosis    Elevated LFTs    Breast cancer in situ    Allergic rhinitis    Myxoma    Wound check, abscess    Obstructive sleep apnea    Non morbid obesity, unspecified obesity type       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

## 2020-06-08 NOTE — LETTER
[facility]  [address] [address]  Phone: [phone]  Fax: [phone]    June 8, 2020       Patient: Pan Young   MR Number: [id number]   YOB: 1934   Date of Visit: 6/8/2020       Nicole Santiago was seen for a follow up visit today. Here is my assessment and plan as well as an attached copy of her visit today:    Obstructive sleep apnea  Chronic- Stable. Reviewed compliance download with pt. Supplies and parts as needed for her machine. These are medically necessary. Continue medications per her PCP and other physicians. Limit caffeine use after 3pm.  Cont provigil 200 mg when driving. 6 month f/u. Essential hypertension  Chronic- Stable. Cont meds per PCP and other physicians. GERD (gastroesophageal reflux disease)  Chronic- Stable. Cont meds per PCP and other physicians. Allergic rhinitis  Chronic- Stable. Cont meds per PCP and other physicians. Non morbid obesity, unspecified obesity type  Chronic-Stable. Encouraged her to work on weight loss through diet and exercise. If you have questions or concerns, please do not hesitate to call me. I look forward to following Melva Weeks along with you.     Sincerely,    Kevin Campos MD    CC providers:  Cecil Dunbar MD  [address] [facility] [address] [facility] [address]  [facility]

## 2020-06-17 ENCOUNTER — TELEPHONE (OUTPATIENT)
Dept: PRIMARY CARE CLINIC | Age: 85
End: 2020-06-17

## 2020-06-17 NOTE — TELEPHONE ENCOUNTER
ECC received a call from:    [facility] Caller[facility]\"    Relationship to patient: self    Organization name: n/a    Best contact number: [phone]    Reason for call:   Pt is requesting a call back to be scheduled for a 3 wk f/u for edema in the legs per pcp. Please advise.

## 2020-06-22 ENCOUNTER — TELEPHONE (OUTPATIENT)
Dept: PRIMARY CARE CLINIC | Age: 85
End: 2020-06-22

## 2020-06-22 ENCOUNTER — OFFICE VISIT (OUTPATIENT)
Dept: PRIMARY CARE CLINIC | Age: 85
End: 2020-06-22
Payer: MEDICARE

## 2020-06-22 VITALS
SYSTOLIC BLOOD PRESSURE: 141 MMHG | RESPIRATION RATE: 14 BRPM | OXYGEN SATURATION: 97 % | TEMPERATURE: 97.2 F | HEART RATE: 79 BPM | BODY MASS INDEX: 36.04 KG/M2 | DIASTOLIC BLOOD PRESSURE: 73 MMHG | WEIGHT: 216.6 LBS

## 2020-06-22 PROCEDURE — 99213 OFFICE O/P EST LOW 20 MIN: CPT | Performed by: FAMILY MEDICINE

## 2020-06-22 ASSESSMENT — ENCOUNTER SYMPTOMS
GASTROINTESTINAL NEGATIVE: 1
RESPIRATORY NEGATIVE: 1
EYES NEGATIVE: 1

## 2020-06-23 ENCOUNTER — TELEPHONE (OUTPATIENT)
Dept: PULMONOLOGY | Age: 85
End: 2020-06-23

## 2020-06-23 NOTE — TELEPHONE ENCOUNTER
Left detailed message advising pt that the last oder sent to [facility] in dec 2019 had heated tubing marked, she will need to contact them. left their number

## 2020-06-24 NOTE — PROGRESS NOTES
Jane Woodruff         : 1934   [place]    Diagnosis: [x] NELLY (G47.33) [] CSA (G47.31) [] Apnea (G47.30)   Length of Need: [x] 12 Months [] 99 Months [] Other:    Machine (JOSEFA!): [x] Respironics Dream Station      Auto [] ResMed AirSense     Auto [] Other:     []  CPAP () [] Bilevel ()   Mode: [] Auto [] Spontaneous    Mode: [] Auto [] Spontaneous                            Comfort Settings:   - Ramp Pressure cmH2O                                        - Ramp time: 15 min                                     -  Flex/EPR - 3 full time                                    - For ResMed Bilevel (TiMax-4 sec   TiMin- 0.2 sec)     Humidifier: [] Heated ()        [x] Water chamber replacement ()/ 1 per 6 months        Mask:   [] Nasal () /1 per 3 months [] Full Face () /1 per 3 months   [] Patient choice -Size and fit mask [] Patient Choice - Size and fit mask   [] Dispense:  [] Dispense:    [] Headgear () / 1 per 3 months [] Headgear () / 1 per 3 months   [] Replacement Nasal Cushion ()/2 per month [] Interface Replacement ()/1 per month   [] Replacement Nasal Pillows ()/2 per month         Tubing: [x] Heated ()/1 per 3 months    [] Standard ()/1 per 3 months [] Other:           Filters: [x] Non-disposable ()/1 per 6 months     [x] Ultra-Fine, Disposable ()/2 per month        Miscellaneous: [] Chin Strap ()/ 1 per 6 months [] O2 bleed-in:       LPM   [] Oximetry on CPAP/Bilevel []  Other:    [x] Modem: ()         Start Order Date: 20    MEDICAL JUSTIFICATION:  I, the undersigned, certify that the above prescribed supplies are medically necessary for this patients wellbeing. In my opinion, the supplies are both reasonable and necessary in reference to accepted standards of medicalpractice in treatment of this patients condition.     Ranjit Rachel MD      NPI: [id number]       Order Signed Date:

## 2020-07-21 NOTE — TELEPHONE ENCOUNTER
Medication:   Requested Prescriptions     Pending Prescriptions Disp Refills    metoprolol tartrate (LOPRESSOR) 25 MG tablet [Pharmacy Med Name: METOPROLOL TARTRATE TABS 25MG] 180 tablet 3     Sig: TAKE 1 TABLET TWICE A DAY     Last Filled: 8.13.19  Last appt: 6/22/2020   Next appt: 9/22/2020    Last OARRS:   RX Monitoring 6/3/2019   Attestation The Prescription Monitoring Report for this patient was reviewed today.    Periodic Controlled Substance Monitoring -

## 2020-07-23 ENCOUNTER — TELEPHONE (OUTPATIENT)
Dept: PRIMARY CARE CLINIC | Age: 85
End: 2020-07-23

## 2020-07-23 NOTE — TELEPHONE ENCOUNTER
----- Message from Jayro Cahmorro sent at 7/23/2020  1:11 PM EDT -----  Subject: Message to Provider    QUESTIONS  Information for Provider? Patient calling in is requesting a medication be   sent in for UTI. Pharmacy? [facility]  CHARANJIT [phone] - F [phone]   ---------------------------------------------------------------------------  --------------  Bradley MASTERS  What is the best way for the office to contact you? OK to leave message on   voicemail  Preferred Call Back Phone Number? [phone]  ---------------------------------------------------------------------------  --------------  SCRIPT ANSWERS  Relationship to Patient?  Self

## 2020-07-24 ENCOUNTER — TELEPHONE (OUTPATIENT)
Dept: PRIMARY CARE CLINIC | Age: 85
End: 2020-07-24

## 2020-07-24 ENCOUNTER — NURSE ONLY (OUTPATIENT)
Dept: PRIMARY CARE CLINIC | Age: 85
End: 2020-07-24
Payer: MEDICARE

## 2020-07-24 PROCEDURE — 81002 URINALYSIS NONAUTO W/O SCOPE: CPT | Performed by: FAMILY MEDICINE

## 2020-07-24 RX ORDER — PHENAZOPYRIDINE HYDROCHLORIDE 100 MG/1
100 TABLET, FILM COATED ORAL 3 TIMES DAILY PRN
Qty: 9 TABLET | Refills: 0 | Status: SHIPPED | OUTPATIENT
Start: 2020-07-24 | End: 2020-07-27

## 2020-07-24 RX ORDER — NITROFURANTOIN 25; 75 MG/1; MG/1
100 CAPSULE ORAL 2 TIMES DAILY
Qty: 14 CAPSULE | Refills: 0 | Status: SHIPPED | OUTPATIENT
Start: 2020-07-24 | End: 2020-07-31

## 2020-07-27 LAB
ORGANISM: ABNORMAL
URINE CULTURE, ROUTINE: ABNORMAL

## 2020-07-27 RX ORDER — BENAZEPRIL HYDROCHLORIDE 40 MG/1
40 TABLET, FILM COATED ORAL EVERY EVENING
Qty: 30 TABLET | Refills: 3 | Status: SHIPPED | OUTPATIENT
Start: 2020-07-27 | End: 2020-07-28 | Stop reason: SDUPTHER

## 2020-07-27 NOTE — TELEPHONE ENCOUNTER
Medication:   Requested Prescriptions     Pending Prescriptions Disp Refills    benazepril (LOTENSIN) 40 MG tablet 30 tablet 3     Sig: Take 1 tablet by mouth every evening     Last Filled:  5/22/20    Last appt: 6/22/2020   Next appt: 9/22/2020    Last Labs DM:   Lab Results   Component Value Date    LABA1C 6.7 05/22/2020     Last Lipid:   Lab Results   Component Value Date    CHOL 167 08/12/2019    TRIG 111 08/12/2019    HDL 64 08/12/2019    HDL 63 05/30/2012    LDLCALC 81 08/12/2019     Last PSA: No results found for: PSA  Last Thyroid:   Lab Results   Component Value Date    TSH 1.83 08/12/2019    T4FREE 1.3 08/18/2014

## 2020-07-28 RX ORDER — BENAZEPRIL HYDROCHLORIDE 40 MG/1
40 TABLET, FILM COATED ORAL EVERY EVENING
Qty: 30 TABLET | Refills: 3 | Status: SHIPPED | OUTPATIENT
Start: 2020-07-28 | End: 2020-09-15 | Stop reason: SDUPTHER

## 2020-07-29 ENCOUNTER — TELEPHONE (OUTPATIENT)
Dept: PRIMARY CARE CLINIC | Age: 85
End: 2020-07-29

## 2020-08-07 RX ORDER — FUROSEMIDE 20 MG/1
TABLET ORAL
Qty: 30 TABLET | Refills: 2 | Status: SHIPPED | OUTPATIENT
Start: 2020-08-07 | End: 2020-09-15

## 2020-08-07 NOTE — TELEPHONE ENCOUNTER
Medication:   Requested Prescriptions     Pending Prescriptions Disp Refills    furosemide (LASIX) 20 MG tablet [Pharmacy Med Name: FUROSEMIDE 20 MG TABLET] 30 tablet 2     Sig: TAKE ONE TABLET BY MOUTH EVERY MORNING     Last Filled:  5/22/20    Last appt: 6/22/2020   Next appt: 9/22/2020    Last Labs DM:   Lab Results   Component Value Date    LABA1C 6.7 05/22/2020     Last Lipid:   Lab Results   Component Value Date    CHOL 167 08/12/2019    TRIG 111 08/12/2019    HDL 64 08/12/2019    HDL 63 05/30/2012    LDLCALC 81 08/12/2019     Last PSA: No results found for: PSA  Last Thyroid:   Lab Results   Component Value Date    TSH 1.83 08/12/2019    T4FREE 1.3 08/18/2014

## 2020-09-14 ENCOUNTER — TELEPHONE (OUTPATIENT)
Dept: PRIMARY CARE CLINIC | Age: 85
End: 2020-09-14

## 2020-09-14 NOTE — TELEPHONE ENCOUNTER
Pt fell hit head chair next to her bed head was hurting  in the middle of the night on 9/11/20 went to er on 9/12/20   Needs to be seen by her PCP   Pt stated that her BP was high she dont remember the diastolic but she stated that her systolic was very high   075     Pt needs to be seen please advise     Lencho Day\"  [phone]

## 2020-09-15 ENCOUNTER — TELEPHONE (OUTPATIENT)
Dept: PRIMARY CARE CLINIC | Age: 85
End: 2020-09-15

## 2020-09-15 ENCOUNTER — OFFICE VISIT (OUTPATIENT)
Dept: PRIMARY CARE CLINIC | Age: 85
End: 2020-09-15
Payer: MEDICARE

## 2020-09-15 VITALS
OXYGEN SATURATION: 96 % | WEIGHT: 216.2 LBS | HEART RATE: 98 BPM | SYSTOLIC BLOOD PRESSURE: 130 MMHG | TEMPERATURE: 98 F | BODY MASS INDEX: 36.91 KG/M2 | DIASTOLIC BLOOD PRESSURE: 80 MMHG | HEIGHT: 64 IN

## 2020-09-15 PROBLEM — E66.01 MORBIDLY OBESE (HCC): Status: ACTIVE | Noted: 2020-09-15

## 2020-09-15 PROCEDURE — G0008 ADMIN INFLUENZA VIRUS VAC: HCPCS | Performed by: FAMILY MEDICINE

## 2020-09-15 PROCEDURE — 90653 IIV ADJUVANT VACCINE IM: CPT | Performed by: FAMILY MEDICINE

## 2020-09-15 PROCEDURE — 99214 OFFICE O/P EST MOD 30 MIN: CPT | Performed by: FAMILY MEDICINE

## 2020-09-15 RX ORDER — BENAZEPRIL HYDROCHLORIDE 40 MG/1
40 TABLET, FILM COATED ORAL EVERY EVENING
Qty: 30 TABLET | Refills: 1 | Status: SHIPPED | OUTPATIENT
Start: 2020-09-15 | End: 2020-09-15 | Stop reason: SDUPTHER

## 2020-09-15 RX ORDER — BENAZEPRIL HYDROCHLORIDE 40 MG/1
40 TABLET, FILM COATED ORAL EVERY EVENING
Qty: 90 TABLET | Refills: 1 | Status: SHIPPED | OUTPATIENT
Start: 2020-09-15 | End: 2020-09-24 | Stop reason: SDUPTHER

## 2020-09-15 RX ORDER — BENAZEPRIL HYDROCHLORIDE 40 MG/1
40 TABLET, FILM COATED ORAL EVERY EVENING
Qty: 90 TABLET | Refills: 1 | Status: SHIPPED | OUTPATIENT
Start: 2020-09-15 | End: 2020-09-15 | Stop reason: SDUPTHER

## 2020-09-15 NOTE — PROGRESS NOTES
SUBJECTIVE:  Patient ID: Brain Sender is a 80 y.o. y.o. female     HPI   Patient is here for follow-up and recent hospital visit  She had a fall at home when she is trying to get out of bed to go the restroom does not remember the detail she landed on the ground and her body ache, does not recall any syncope like a dizzy lightheaded she was conscious through this she started to have a headache than the end of the emergency room  CT scan was normal she feels better now  Occasional headache, could be due to the fall seems getting slowly better  Her back pain has improved, she had surgery in the past  They brought all her medication with her to make sure she is taking the right thing at home I reviewed all the medication with the patient and her   Took away the one she is not supposed to be taken.   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 replacement therapy     Rectocele 1970s    Seasonal allergies     Sleep apnea 2010    Spinal stenosis of lumbar region     Gets epidural injections every 6 months      Past Surgical History:   Procedure Laterality Date    BREAST BIOPSY Right 5/3/13    biopsy    BREAST LUMPECTOMY  9/27/05    right lumpectomy   [address] REMOVAL  1/08    bilateral    COLONOSCOPY  2005    30, R7X 7X 2005    CYSTOCELE REPAIR  1989    DILATION AND CURETTAGE OF UTERUS  yrs ago    DOPPLER ECHOCARDIOGRAPHY  2007    EYE SURGERY      bilat catarats    HYSTERECTOMY  4/1974    JOINT REPLACEMENT      left hip    KNEE SURGERY  1991    L    KNEE SURGERY  1993    R    MOHS SURGERY  2/08    nose    OTHER SURGICAL HISTORY  12/22/06    epidural    RECTOCELE REPAIR  1989    SUTURE REMOVAL  june 2004    sutures in 3 fingers    TONSILLECTOMY      TOTAL HIP ARTHROPLASTY  2006    L     Family History   Problem Relation Age of Onset    Cancer Brother         Prostate    Cancer Mother         Stomach Cancer at age 80    Stroke Father     Cancer Paternal Grandmother         intraadbominal at age 80    Cancer Other         Leukemia age 70     Social History     Socioeconomic History    Marital status:      Spouse name: Maurilio Avila Number of children: 2    Years of education: Not on file    Highest education level: Not on file   Occupational History     Employer: RETIRED   Social Needs    Financial resource strain: Not hard at all   [employer] insecurity     Worry: Never true     Inability: Never true    Transportation needs     Medical: No     Non-medical: No   Tobacco Use    Smoking status: Never Smoker    Smokeless tobacco: Never Used   Substance and Sexual Activity    Alcohol use: Yes     Comment: very rare occasions    Drug use: No    Sexual activity: Yes     Partners: Male   Lifestyle    Physical activity     Days per week: Not on file     Minutes per session: Not on file    Stress: Not on file   Relationships    Social connections     Talks on phone: Not on file     Gets together: Not on file     Attends Restorationist service: Not on file     Active member of club or organization: Not on file     Attends meetings of clubs or organizations: Not on file     Relationship status: Not on file    Intimate partner violence     Fear of current or ex partner: Not on file     Emotionally abused: Not on file     Physically abused: Not on file     Forced sexual activity: Not on file   Other Topics Concern    Not on file Social History Narrative    Not on file     Current Outpatient Medications   Medication Sig Dispense Refill    benazepril (LOTENSIN) 40 MG tablet Take 1 tablet by mouth every evening 90 tablet 1    metoprolol tartrate (LOPRESSOR) 25 MG tablet TAKE 1 TABLET TWICE A  tablet 3    pravastatin (PRAVACHOL) 20 MG tablet TAKE 1 TABLET DAILY 90 tablet 3    pantoprazole (PROTONIX) 40 MG tablet TAKE 1 TABLET DAILY 90 tablet 3    DULoxetine (CYMBALTA) 60 MG extended release capsule TAKE 1 CAPSULE DAILY 90 capsule 4    ergocalciferol (ERGOCALCIFEROL) 96666 units capsule Take 50,000 Units by mouth      Calcium Polycarbophil (FIBER-CAPS PO) Take by mouth      Multiple Vitamins-Minerals (VISION VITAMINS PO) Take by mouth      meclizine (ANTIVERT) 25 MG tablet Take 25 mg by mouth 2 times daily.  modafinil (PROVIGIL) 200 MG tablet Take 200 mg by mouth daily. No current facility-administered medications for this visit. Allergies   Allergen Reactions    Lodine [Etodolac] Rash       Review of Systems   Constitutional: Negative. HENT: Negative. Eyes: Negative. Respiratory: Negative. Cardiovascular: Negative. Gastrointestinal: Negative. Musculoskeletal: Positive for arthralgias and back pain. Neurological: Positive for headaches. All other systems reviewed and are negative. OBJECTIVE:  /80 (Site: Left Upper Arm, Position: Sitting, Cuff Size: Medium Adult)   Pulse 98   Temp 98 °F (36.7 °C) (Temporal)   Ht 5' 4\" (1.626 m)   Wt 216 lb 3.2 oz (98.1 kg)   SpO2 96%   BMI 37.11 kg/m²     Physical Exam  Vitals signs reviewed. Constitutional:       Appearance: Normal appearance. Eyes:      General: No scleral icterus. Conjunctiva/sclera: Conjunctivae normal.   Neck:      Thyroid: No thyromegaly. Vascular: No JVD. Cardiovascular:      Rate and Rhythm: Normal rate and regular rhythm. Heart sounds: Normal heart sounds. No murmur. No friction rub. No gallop. Pulmonary:      Effort: Pulmonary effort is normal.      Breath sounds: Normal breath sounds. No wheezing or rales. Abdominal:      General: Bowel sounds are normal. There is no distension. Palpations: Abdomen is soft. There is no mass. Tenderness: There is no abdominal tenderness. Hernia: No hernia is present. Lymphadenopathy:      Cervical: No cervical adenopathy. Skin:     Findings: No rash. Neurological:      Mental Status: She is alert and oriented to person, place, and time. ASSESSMENT:   Diagnosis Orders   1. Bilateral low back pain without sciatica, unspecified chronicity  XR THORACIC SPINE (2 VIEWS)    XR LUMBAR SPINE (2-3 VIEWS)   2. Morbidly obese ([address]     3. Essential hypertension  benazepril (LOTENSIN) 40 MG tablet    DISCONTINUED: benazepril (LOTENSIN) 40 MG tablet    DISCONTINUED: benazepril (LOTENSIN) 40 MG tablet   4.  Acute intractable headache, unspecified headache type             PLAN:    Reviewed medication/ER visit records imaging and blood work  A new list of medication is given to the patient and her   Discussed healthy lifestyle placed focus on calorie intake  Headache rest hydration avoid any stimulants let me know if continue to have a headache

## 2020-09-15 NOTE — TELEPHONE ENCOUNTER
Pt left medicine here at visit, thinking we could dispose of these meds we can not. I was instructed to inform pt to pick medicine up from the office.

## 2020-09-15 NOTE — PROGRESS NOTES
Vaccine Information Sheet, \"Influenza - Inactivated\"  given to Brain Sender, or parent/legal guardian of  Brain Sender and verbalized understanding. Patient responses:    Have you ever had a reaction to a flu vaccine? NO  Do you have any current illness? NO  Have you ever had Guillian [place] Syndrome? NO  Do you have a serious allergy to any of the follow: Neomycin, Polymyxin, Thimerosal, eggs or egg products? NO    Flu vaccine given per order. Please see immunization tab. Risks and benefits explained. Current VIS given.       Immunizations Administered     Name Date Dose Route    Influenza, Triv, inactivated, subunit, adjuvanted, IM (Fluad 65 yrs and older) 9/15/2020 0.5 mL Intramuscular    Site: Deltoid- Left    Lot: [id number]    [address]: [phone]

## 2020-09-17 ASSESSMENT — ENCOUNTER SYMPTOMS
BACK PAIN: 1
GASTROINTESTINAL NEGATIVE: 1
EYES NEGATIVE: 1
RESPIRATORY NEGATIVE: 1

## 2020-09-21 ENCOUNTER — NURSE TRIAGE (OUTPATIENT)
Dept: OTHER | Facility: CLINIC | Age: 85
End: 2020-09-21

## 2020-09-21 NOTE — TELEPHONE ENCOUNTER
09/17 fell and went to ED to get evaluated. Still having intermitted headaches. Calling for a follow up appointment. Reason for Disposition   Headache is a chronic symptom (recurrent or ongoing AND lasting > 4 weeks)    Answer Assessment - Initial Assessment Questions  1. LOCATION: \"Where does it hurt? \"       Back part, appears to be right where she hit her head on the fall. 2. ONSET: \"When did the headache start? \" (Minutes, hours or days)       *No Answer*  3. PATTERN: \"Does the pain come and go, or has it been constant since it started? \"      *No Answer*  4. SEVERITY: \"How bad is the pain? \" and \"What does it keep you from doing? \"  (e.g., Scale 1-10; mild, moderate, or severe)    - MILD (1-3): doesn't interfere with normal activities     - MODERATE (4-7): interferes with normal activities or awakens from sleep     - SEVERE (8-10): excruciating pain, unable to do any normal activities         0/10 at this time; 5-6/10 comes and goes   5. RECURRENT SYMPTOM: \"Have you ever had headaches before? \" If so, ask: \"When was the last time? \" and \"What happened that time? \"       *No Answer*  6. CAUSE: \"What do you think is causing the headache? \"      *No Answer*  7. MIGRAINE: \"Have you been diagnosed with migraine headaches? \" If so, ask: \"Is this headache similar? \"       *No Answer*  8. HEAD INJURY: \"Has there been any recent injury to the head? \"       Yes she fell and was evaluated in the ED. 9. OTHER SYMPTOMS: \"Do you have any other symptoms? \" (fever, stiff neck, eye pain, sore throat, cold symptoms)      *No Answer*  10. PREGNANCY: \"Is there any chance you are pregnant? \" \"When was your last menstrual period? \"        *No Answer*    Protocols used: HEADACHE-ADULT-[place]    Care advice given. Instructed to call back, go to urgent care or ED if symptoms worsen. Connected caller to [facility]. Call from [place]. Caller plans to follow through with advice. No further questions.      Please do not respond to the triage nurse through this encounter. Any subsequent communication should be directly with the patient.

## 2020-09-24 ENCOUNTER — HOSPITAL ENCOUNTER (OUTPATIENT)
Age: 85
Discharge: HOME | End: 2020-09-24
Payer: MEDICARE

## 2020-09-24 ENCOUNTER — OFFICE VISIT (OUTPATIENT)
Dept: PRIMARY CARE CLINIC | Age: 85
End: 2020-09-24
Payer: MEDICARE

## 2020-09-24 ENCOUNTER — HOSPITAL ENCOUNTER (OUTPATIENT)
Dept: GENERAL RADIOLOGY | Age: 85
Discharge: HOME | End: 2020-09-24
Payer: MEDICARE

## 2020-09-24 VITALS
OXYGEN SATURATION: 98 % | HEART RATE: 88 BPM | TEMPERATURE: 98.2 F | RESPIRATION RATE: 16 BRPM | SYSTOLIC BLOOD PRESSURE: 124 MMHG | BODY MASS INDEX: 36.05 KG/M2 | WEIGHT: 210 LBS | DIASTOLIC BLOOD PRESSURE: 80 MMHG

## 2020-09-24 PROCEDURE — 72070 X-RAY EXAM THORAC SPINE 2VWS: CPT

## 2020-09-24 PROCEDURE — 72100 X-RAY EXAM L-S SPINE 2/3 VWS: CPT

## 2020-09-24 PROCEDURE — 99213 OFFICE O/P EST LOW 20 MIN: CPT | Performed by: FAMILY MEDICINE

## 2020-09-24 RX ORDER — BENAZEPRIL HYDROCHLORIDE 40 MG/1
40 TABLET, FILM COATED ORAL EVERY EVENING
Qty: 90 TABLET | Refills: 1 | Status: SHIPPED | OUTPATIENT
Start: 2020-09-24 | End: 2020-09-24 | Stop reason: SDUPTHER

## 2020-09-24 RX ORDER — BENAZEPRIL HYDROCHLORIDE 40 MG/1
40 TABLET, FILM COATED ORAL EVERY EVENING
Qty: 30 TABLET | Refills: 1 | Status: SHIPPED | OUTPATIENT
Start: 2020-09-24 | End: 2020-09-24 | Stop reason: SDUPTHER

## 2020-09-24 RX ORDER — BENAZEPRIL HYDROCHLORIDE 40 MG/1
40 TABLET, FILM COATED ORAL EVERY EVENING
Qty: 30 TABLET | Refills: 1 | Status: SHIPPED | OUTPATIENT
Start: 2020-09-24 | End: 2020-12-08

## 2020-09-24 ASSESSMENT — ENCOUNTER SYMPTOMS
GASTROINTESTINAL NEGATIVE: 1
RESPIRATORY NEGATIVE: 1
EYES NEGATIVE: 1

## 2020-09-24 NOTE — PROGRESS NOTES
SUBJECTIVE:  Patient ID: Karthikeyan Castro is a 80 y.o. y.o. female     HPI   Today with a office visit because of persistent headache after the fall, today she has no headache, she feels well today  Hypertension: Patient here for follow-up of elevated blood pressure. She is not exercising and is adherent to low salt diet.  Blood pressure is well controlled at home. Cardiac symptoms none. Patient denies chest pain, chest pressure/discomfort, claudication, dyspnea, exertional chest pressure/discomfort, fatigue, irregular heart beat, lower extremity edema, near-syncope, orthopnea, palpitations, paroxysmal nocturnal dyspnea and syncope.  Cardiovascular risk factors: advanced age (older than 54 for men, 72 for women), dyslipidemia, hypertension and obesity (BMI >= 30 kg/m2). Use of agents associated with hypertension: none. History of target organ damage: none.   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 replacement therapy     Rectocele 1970s    Seasonal allergies     Sleep apnea 2010    Spinal stenosis of lumbar region     Gets epidural injections every 6 months      Past Surgical History:   Procedure Laterality Date    BREAST BIOPSY Right 5/3/13    biopsy    BREAST LUMPECTOMY  9/27/05    right lumpectomy   [address] [facility] [place] [address] REMOVAL  1/08    bilateral    COLONOSCOPY  2005    30, R7X 7X 2005    CYSTOCELE REPAIR  None     Current Outpatient Medications   Medication Sig Dispense Refill    benazepril (LOTENSIN) 40 MG tablet Take 1 tablet by mouth every evening 30 tablet 1    metoprolol tartrate (LOPRESSOR) 25 MG tablet TAKE 1 TABLET TWICE A  tablet 3    pravastatin (PRAVACHOL) 20 MG tablet TAKE 1 TABLET DAILY 90 tablet 3    pantoprazole (PROTONIX) 40 MG tablet TAKE 1 TABLET DAILY 90 tablet 3    DULoxetine (CYMBALTA) 60 MG extended release capsule TAKE 1 CAPSULE DAILY 90 capsule 4    modafinil (PROVIGIL) 200 MG tablet Take 200 mg by mouth daily.  ergocalciferol (ERGOCALCIFEROL) 98475 units capsule Take 50,000 Units by mouth      Calcium Polycarbophil (FIBER-CAPS PO) Take by mouth      Multiple Vitamins-Minerals (VISION VITAMINS PO) Take by mouth      meclizine (ANTIVERT) 25 MG tablet Take 25 mg by mouth 2 times daily. No current facility-administered medications for this visit. Allergies   Allergen Reactions    Lodine [Etodolac] Rash       Review of Systems   Constitutional: Negative. HENT: Negative. Eyes: Negative. Respiratory: Negative. Cardiovascular: Negative. Gastrointestinal: Negative. Neurological: Negative for dizziness, facial asymmetry and headaches. All other systems reviewed and are negative. OBJECTIVE:  /80 (Site: Left Upper Arm, Position: Sitting, Cuff Size: Medium Adult)   Pulse 88   Temp 98.2 °F (36.8 °C) (Skin)   Resp 16   Wt 210 lb (95.3 kg)   SpO2 98%   BMI 36.05 kg/m²     Physical Exam  Vitals signs reviewed. Eyes:      General: No scleral icterus. Conjunctiva/sclera: Conjunctivae normal.   Neck:      Thyroid: No thyromegaly. Vascular: No JVD. Cardiovascular:      Rate and Rhythm: Normal rate and regular rhythm. Heart sounds: Normal heart sounds. No murmur. No friction rub. No gallop. Pulmonary:      Effort: Pulmonary effort is normal.      Breath sounds: Normal breath sounds. No wheezing or rales.    Abdominal: General: Bowel sounds are normal. There is no distension. Palpations: Abdomen is soft. There is no mass. Tenderness: There is no abdominal tenderness. Hernia: No hernia is present. Lymphadenopathy:      Cervical: No cervical adenopathy. Skin:     Findings: No rash. Neurological:      Mental Status: She is alert and oriented to person, place, and time. ASSESSMENT:     Diagnosis Orders   1. Essential hypertension  benazepril (LOTENSIN) 40 MG tablet    DISCONTINUED: benazepril (LOTENSIN) 40 MG tablet    DISCONTINUED: benazepril (LOTENSIN) 40 MG tablet       PLAN:  Per the patient for some reason she did not receive the medication from [facility] another prescription sent today  Along with a printed prescription to take to [facility] to get 30 days  Continue to monitor blood pressure  Reviewed medication printed list of what she supposed to be on.

## 2020-10-15 ENCOUNTER — TELEPHONE (OUTPATIENT)
Dept: PRIMARY CARE CLINIC | Age: 85
End: 2020-10-15

## 2020-10-15 RX ORDER — NITROFURANTOIN 25; 75 MG/1; MG/1
100 CAPSULE ORAL 2 TIMES DAILY
Qty: 20 CAPSULE | Refills: 0 | Status: SHIPPED | OUTPATIENT
Start: 2020-10-15 | End: 2020-10-25

## 2020-10-15 NOTE — TELEPHONE ENCOUNTER
[phone] (home) [phone] (work)  S[poke w/patient, has had pain and burning for the last 3 days, would like ABX called in tonight.   Pt scheduled appt 10/16

## 2020-10-15 NOTE — TELEPHONE ENCOUNTER
Patient called and states that her UTI is getting worse and needs something today for it. She states that it is burning pretty bad.      CB- [phone]    LOV 09/24/2020

## 2020-10-16 ENCOUNTER — OFFICE VISIT (OUTPATIENT)
Dept: PRIMARY CARE CLINIC | Age: 85
End: 2020-10-16
Payer: MEDICARE

## 2020-10-16 VITALS
OXYGEN SATURATION: 98 % | BODY MASS INDEX: 36.22 KG/M2 | HEART RATE: 80 BPM | RESPIRATION RATE: 16 BRPM | TEMPERATURE: 97.2 F | SYSTOLIC BLOOD PRESSURE: 138 MMHG | DIASTOLIC BLOOD PRESSURE: 74 MMHG | WEIGHT: 211 LBS

## 2020-10-16 LAB
BILIRUBIN, POC: NORMAL
BLOOD URINE, POC: NORMAL
CLARITY, POC: NORMAL
COLOR, POC: NORMAL
GLUCOSE URINE, POC: NORMAL
KETONES, POC: NORMAL
LEUKOCYTE EST, POC: NORMAL
NITRITE, POC: NORMAL
PH, POC: 7
PROTEIN, POC: NORMAL
SPECIFIC GRAVITY, POC: 1.01
UROBILINOGEN, POC: 0.2

## 2020-10-16 PROCEDURE — 99214 OFFICE O/P EST MOD 30 MIN: CPT | Performed by: FAMILY MEDICINE

## 2020-10-16 PROCEDURE — 81002 URINALYSIS NONAUTO W/O SCOPE: CPT | Performed by: FAMILY MEDICINE

## 2020-10-16 ASSESSMENT — ENCOUNTER SYMPTOMS
EYES NEGATIVE: 1
RESPIRATORY NEGATIVE: 1
GASTROINTESTINAL NEGATIVE: 1

## 2020-10-16 NOTE — PROGRESS NOTES
SUBJECTIVE:  Patient ID: Claudette Saber is a 80 y.o. y.o. female     HPI   Urinary Tract Infection: Patient complains of dysuria, frequency, urgency, inability to void, suprapubic pressure, bilaterally flank pain She has had symptoms for a few days. Patient also complains of back pain and stomach ache. Patient denies congestion, cough, fever, headache, rhinitis and sorethroat. Patient does have a history of recurrent UTI. Patient does not have a history of pyelonephritis.    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 replacement therapy     Rectocele 1970s    Seasonal allergies     Sleep apnea 2010    Spinal stenosis of lumbar region     Gets epidural injections every 6 months      Past Surgical History:   Procedure Laterality Date    BREAST BIOPSY Right 5/3/13    biopsy    BREAST LUMPECTOMY  9/27/05    right lumpectomy   [address]  1/08    bilateral    COLONOSCOPY  2005    30, R7X 7X 2005    CYSTOCELE REPAIR  1989    DILATION AND CURETTAGE OF UTERUS  yrs ago    DOPPLER ECHOCARDIOGRAPHY  2007    EYE SURGERY      bilat catarats    HYSTERECTOMY  4/1974    JOINT REPLACEMENT      left hip   [address]    MOHS SURGERY  2/08    nose    OTHER SURGICAL HISTORY 12/22/06    epidural    RECTOCELE REPAIR  1989    SUTURE REMOVAL  june 2004    sutures in 3 fingers    TONSILLECTOMY      TOTAL HIP ARTHROPLASTY  2006    L     Family History   Problem Relation Age of Onset    Cancer Brother         Prostate    Cancer Mother         Stomach Cancer at age 80    Stroke Father     Cancer Paternal Grandmother         intraadbominal at age 80    Cancer Other         Leukemia age 70     Social History     Socioeconomic History    Marital status:      Spouse name: Frank Burnett Number of children: 2    Years of education: Not on file    Highest education level: Not on file   Occupational History     Employer: RETIRED   Social Needs    Financial resource strain: Not hard at all   [employer] insecurity     Worry: Never true     Inability: Never true    Transportation needs     Medical: No     Non-medical: No   Tobacco Use    Smoking status: Never Smoker    Smokeless tobacco: Never Used   Substance and Sexual Activity    Alcohol use: Yes     Comment: very rare occasions    Drug use: No    Sexual activity: Yes     Partners: Male   Lifestyle    Physical activity     Days per week: Not on file     Minutes per session: Not on file    Stress: Not on file   Relationships    Social connections     Talks on phone: Not on file     Gets together: Not on file     Attends Christian service: Not on file     Active member of club or organization: Not on file     Attends meetings of clubs or organizations: Not on file     Relationship status: Not on file    Intimate partner violence     Fear of current or ex partner: Not on file     Emotionally abused: Not on file     Physically abused: Not on file     Forced sexual activity: Not on file   Other Topics Concern    Not on file   Social History Narrative    Not on file     Current Outpatient Medications   Medication Sig Dispense Refill    nitrofurantoin, macrocrystal-monohydrate, (MACROBID) 100 MG capsule Take 1 capsule by mouth 2 times daily for 10 days 20 capsule 0    benazepril (LOTENSIN) 40 MG tablet Take 1 tablet by mouth every evening 30 tablet 1    metoprolol tartrate (LOPRESSOR) 25 MG tablet TAKE 1 TABLET TWICE A  tablet 3    pravastatin (PRAVACHOL) 20 MG tablet TAKE 1 TABLET DAILY 90 tablet 3    pantoprazole (PROTONIX) 40 MG tablet TAKE 1 TABLET DAILY 90 tablet 3    DULoxetine (CYMBALTA) 60 MG extended release capsule TAKE 1 CAPSULE DAILY 90 capsule 4    modafinil (PROVIGIL) 200 MG tablet Take 200 mg by mouth daily.  ergocalciferol (ERGOCALCIFEROL) 14028 units capsule Take 50,000 Units by mouth      Calcium Polycarbophil (FIBER-CAPS PO) Take by mouth      Multiple Vitamins-Minerals (VISION VITAMINS PO) Take by mouth      meclizine (ANTIVERT) 25 MG tablet Take 25 mg by mouth 2 times daily. No current facility-administered medications for this visit. Allergies   Allergen Reactions    Lodine [Etodolac] Rash       Review of Systems   Constitutional: Negative. Negative for appetite change, chills, diaphoresis, fatigue and fever. HENT: Negative. Eyes: Negative. Respiratory: Negative. Cardiovascular: Negative. Gastrointestinal: Negative. Genitourinary: Positive for difficulty urinating, dysuria, flank pain, frequency and urgency. OBJECTIVE:  /74 (Site: Right Upper Arm, Position: Sitting, Cuff Size: Large Adult)   Pulse 80   Temp 97.2 °F (36.2 °C) (Temporal)   Resp 16   Wt 211 lb (95.7 kg)   SpO2 98%   BMI 36.22 kg/m²     Physical Exam  Vitals signs reviewed. Eyes:      General: No scleral icterus. Conjunctiva/sclera: Conjunctivae normal.   Neck:      Thyroid: No thyromegaly. Vascular: No JVD. Cardiovascular:      Rate and Rhythm: Normal rate and regular rhythm. Heart sounds: Normal heart sounds. No murmur. No friction rub. No gallop. Pulmonary:      Effort: Pulmonary effort is normal.      Breath sounds: Normal breath sounds. No wheezing or rales. Abdominal:      General: Bowel sounds are normal. There is no distension. Palpations: Abdomen is soft. There is no mass. Tenderness: There is abdominal tenderness in the suprapubic area. Hernia: No hernia is present. Lymphadenopathy:      Cervical: No cervical adenopathy. Skin:     Findings: No rash. Neurological:      Mental Status: She is alert and oriented to person, place, and time. ASSESSMENT:     Diagnosis Orders   1. Dysuria  POCT Urinalysis no Micro    Basic Metabolic Panel    CBC    Culture, Urine   2.  Urinary tract infection with hematuria, site unspecified         PLAN:  See orders  Increase hydration  Any worsening symptoms may need to report the emergency room

## 2020-10-18 LAB
ALBUMIN SERPL-MCNC: 4 G/DL (ref 3.5–5.7)
AMMONIA: 54 UG/DL (ref 27–90)
ANION GAP SERPL CALCULATED.3IONS-SCNC: 10 MMOL/L (ref 3–16)
BASOPHILS ABSOLUTE: 37 /UL (ref 0–200)
BASOPHILS RELATIVE PERCENT: 0.5 % (ref 0–1)
BUN BLDV-MCNC: 9 MG/DL (ref 7–25)
CALCIUM SERPL-MCNC: 9.2 MG/DL (ref 8.6–10.3)
CHLORIDE BLD-SCNC: 103 MMOL/L (ref 98–110)
CO2: 27 MMOL/L (ref 21–33)
CREAT SERPL-MCNC: 0.79 MG/DL (ref 0.6–1.3)
EOSINOPHILS ABSOLUTE: 318 /UL (ref 15–500)
EOSINOPHILS RELATIVE PERCENT: 4.3 % (ref 0–8)
GFR, ESTIMATED: 68 SEE NOTE.
GFR, ESTIMATED: 79 SEE NOTE.
GLUCOSE BLD-MCNC: 150 MG/DL (ref 70–100)
HCT VFR BLD CALC: 42.5 % (ref 35–45)
HEMOGLOBIN: 14.6 G/DL (ref 11.7–15.5)
LYMPHOCYTES ABSOLUTE: 1569 /UL (ref 850–3900)
LYMPHOCYTES RELATIVE PERCENT: 21.2 % (ref 15–45)
MCH RBC QN AUTO: 32.3 PG (ref 27–33)
MCHC RBC AUTO-ENTMCNC: 34.4 G/DL (ref 32–36)
MCV RBC AUTO: 93.8 FL (ref 80–100)
MONOCYTES ABSOLUTE: 710 /UL (ref 200–950)
MONOCYTES RELATIVE PERCENT: 9.6 % (ref 0–12)
NEUTROPHILS ABSOLUTE: 4766 /UL (ref 1500–7800)
NUCLEATED RED BLOOD CELLS: 0 /100 WBC (ref 0–0)
ORGANISM: ABNORMAL
OSMOLALITY CALCULATION: 292 MOSM/KG (ref 278–305)
PDW BLD-RTO: 14.2 % (ref 11–15)
PHOSPHORUS: 3.2 MG/DL (ref 2.1–4.5)
PLATELET # BLD: 210 10E3/UL (ref 140–400)
PMV BLD AUTO: 8.3 FL (ref 7.5–11.5)
POTASSIUM SERPL-SCNC: 3.6 MMOL/L (ref 3.5–5.3)
RBC # BLD: 4.53 10E6/UL (ref 3.8–5.1)
SEGMENTED NEUTROPHILS RELATIVE PERCENT: 64.4 % (ref 40–80)
SODIUM BLD-SCNC: 140 MMOL/L (ref 133–146)
URINE CULTURE, ROUTINE: ABNORMAL
WBC # BLD: 7.4 10E3/UL (ref 3.8–10.8)

## 2020-10-19 LAB
ANION GAP SERPL CALCULATED.3IONS-SCNC: 11 MMOL/L (ref 3–16)
BUN BLDV-MCNC: 11 MG/DL (ref 7–25)
CALCIUM SERPL-MCNC: 9 MG/DL (ref 8.6–10.3)
CHLORIDE BLD-SCNC: 103 MMOL/L (ref 98–110)
CO2: 26 MMOL/L (ref 21–33)
CREAT SERPL-MCNC: 0.83 MG/DL (ref 0.6–1.3)
GFR, ESTIMATED: 64 SEE NOTE.
GFR, ESTIMATED: 74 SEE NOTE.
GLUCOSE BLD-MCNC: 159 MG/DL (ref 70–100)
HCT VFR BLD CALC: 42.6 % (ref 35–45)
HEMOGLOBIN: 14.7 G/DL (ref 11.7–15.5)
MCH RBC QN AUTO: 32.2 PG (ref 27–33)
MCHC RBC AUTO-ENTMCNC: 34.6 G/DL (ref 32–36)
MCV RBC AUTO: 93.2 FL (ref 80–100)
OSMOLALITY CALCULATION: 293 MOSM/KG (ref 278–305)
PDW BLD-RTO: 13.9 % (ref 11–15)
PLATELET # BLD: 207 10E3/UL (ref 140–400)
PMV BLD AUTO: 8.3 FL (ref 7.5–11.5)
POTASSIUM SERPL-SCNC: 3.4 MMOL/L (ref 3.5–5.3)
RBC # BLD: 4.58 10E6/UL (ref 3.8–5.1)
SODIUM BLD-SCNC: 140 MMOL/L (ref 133–146)
WBC # BLD: 7.3 10E3/UL (ref 3.8–10.8)

## 2020-11-03 ENCOUNTER — OFFICE VISIT (OUTPATIENT)
Dept: PRIMARY CARE CLINIC | Age: 85
End: 2020-11-03
Payer: MEDICARE

## 2020-11-03 VITALS
DIASTOLIC BLOOD PRESSURE: 80 MMHG | BODY MASS INDEX: 35.7 KG/M2 | WEIGHT: 208 LBS | TEMPERATURE: 97.8 F | HEART RATE: 74 BPM | OXYGEN SATURATION: 97 % | SYSTOLIC BLOOD PRESSURE: 130 MMHG

## 2020-11-03 LAB
BILIRUBIN, POC: NORMAL
BLOOD URINE, POC: NORMAL
CLARITY, POC: CLEAR
COLOR, POC: YELLOW
GLUCOSE URINE, POC: NORMAL
KETONES, POC: NORMAL
LEUKOCYTE EST, POC: NORMAL
NITRITE, POC: NORMAL
PH, POC: 6
PROTEIN, POC: NORMAL
SPECIFIC GRAVITY, POC: 1.02
UROBILINOGEN, POC: 0.2

## 2020-11-03 PROCEDURE — 99214 OFFICE O/P EST MOD 30 MIN: CPT | Performed by: FAMILY MEDICINE

## 2020-11-03 PROCEDURE — 81002 URINALYSIS NONAUTO W/O SCOPE: CPT | Performed by: FAMILY MEDICINE

## 2020-11-03 RX ORDER — NAPROXEN SODIUM 220 MG
220 TABLET ORAL 2 TIMES DAILY WITH MEALS
COMMUNITY
End: 2021-10-19

## 2020-11-03 RX ORDER — VITAMIN E 268 MG
400 CAPSULE ORAL DAILY
COMMUNITY

## 2020-11-03 RX ORDER — MULTIVITAMIN WITH IRON
100 TABLET ORAL DAILY
COMMUNITY

## 2020-11-03 NOTE — PROGRESS NOTES
SUBJECTIVE:  Patient ID: Claudette Saber is a 80 y.o. y.o. female     HPI   Patient is here for follow-up and recent hospital admission due to UTI/hallucination  She is home now feeling better back to normal she cannot recall much what had happened off medication, wants to check her urine  Problem list of her medication reviewed compatible we have here for    Hypertension: Patient here for follow-up of elevated blood pressure. She is not exercising and is adherent to low salt diet.  Blood pressure is well controlled at home. Cardiac symptoms none. Patient denies chest pain, chest pressure/discomfort, claudication, dyspnea, exertional chest pressure/discomfort, fatigue, irregular heart beat, lower extremity edema, near-syncope, orthopnea, palpitations, paroxysmal nocturnal dyspnea and syncope.  Cardiovascular risk factors: advanced age (older than 54 for men, 72 for women), dyslipidemia, hypertension and obesity (BMI >= 30 kg/m2). Use of agents associated with hypertension: none. History of target organ damage: none.   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 replacement therapy     Rectocele 1970s    Seasonal allergies     Sleep apnea 2010    Spinal stenosis of lumbar region     Gets epidural injections every 6 months      Past Surgical History:   Procedure Laterality Date    BREAST BIOPSY Right 5/3/13    biopsy    BREAST LUMPECTOMY  9/27/05    right lumpectomy   [address] REMOVAL  1/08    bilateral    COLONOSCOPY  2005 30, R7X 7X 2005    CYSTOCELE REPAIR  1989    DILATION AND CURETTAGE OF UTERUS  yrs ago    DOPPLER ECHOCARDIOGRAPHY  2007    EYE SURGERY      bilat catarats    HYSTERECTOMY  4/1974    JOINT REPLACEMENT      left hip    KNEE SURGERY  1991    L    KNEE SURGERY  1993    R    MOHS SURGERY  2/08    nose    OTHER SURGICAL HISTORY  12/22/06    epidural    RECTOCELE REPAIR  1989    SUTURE REMOVAL  june 2004    sutures in 3 fingers    TONSILLECTOMY      TOTAL HIP ARTHROPLASTY  2006    L     Family History   Problem Relation Age of Onset    Cancer Brother         Prostate    Cancer Mother         Stomach Cancer at age 80    Stroke Father     Cancer Paternal Grandmother         intraadbominal at age 80    Cancer Other         Leukemia age 70     Social History     Socioeconomic History    Marital status:      Spouse name: Irma Fonseca Number of children: 2    Years of education: None    Highest education level: None   Occupational History     Employer: RETIRED   Social Needs    Financial resource strain: Not hard at all   [email or web address] insecurity     Worry: Never true     Inability: Never true    Transportation needs     Medical: No     Non-medical: No   Tobacco Use    Smoking status: Never Smoker    Smokeless tobacco: Never Used   Substance and Sexual Activity    Alcohol use: Yes     Comment: very rare occasions    Drug use: No    Sexual activity: Yes     Partners: Male   Lifestyle    Physical activity     Days per week: None     Minutes per session: None    Stress: None   Relationships    Social connections     Talks on phone: None     Gets together: None     Attends Jew service: None     Active member of club or organization: None     Attends meetings of clubs or organizations: None     Relationship status: None    Intimate partner violence     Fear of current or ex partner: None     Emotionally abused: None     Physically abused: None     Forced sexual activity: None   Other Topics Concern Rhythm: Normal rate and regular rhythm. Heart sounds: Normal heart sounds. No murmur. No friction rub. No gallop. Pulmonary:      Effort: Pulmonary effort is normal.      Breath sounds: Normal breath sounds. No wheezing or rales. Abdominal:      General: Bowel sounds are normal. There is no distension. Palpations: Abdomen is soft. There is no mass. Tenderness: There is no abdominal tenderness. Hernia: No hernia is present. Lymphadenopathy:      Cervical: No cervical adenopathy. Skin:     Findings: No rash. Neurological:      Mental Status: She is alert and oriented to person, place, and time. ASSESSMENT:   Diagnosis Orders   1. Urinary tract infection with hematuria, site unspecified  POCT Urinalysis no Micro   2.  Essential hypertension         PLAN:  See orders  Reviewed ER/hospital records  Urine is clean today  Advised the patient to get full neuropsych evaluation  Referral is given  Close follow-up

## 2020-11-05 ASSESSMENT — ENCOUNTER SYMPTOMS
EYES NEGATIVE: 1
RESPIRATORY NEGATIVE: 1
GASTROINTESTINAL NEGATIVE: 1

## 2020-11-09 ENCOUNTER — TELEPHONE (OUTPATIENT)
Dept: PRIMARY CARE CLINIC | Age: 85
End: 2020-11-09

## 2020-12-08 ENCOUNTER — TELEPHONE (OUTPATIENT)
Dept: PULMONOLOGY | Age: 85
End: 2020-12-08

## 2020-12-08 RX ORDER — BENAZEPRIL HYDROCHLORIDE 40 MG/1
TABLET, FILM COATED ORAL
Qty: 90 TABLET | Refills: 2 | Status: SHIPPED | OUTPATIENT
Start: 2020-12-08 | End: 2021-08-30 | Stop reason: SDUPTHER

## 2020-12-08 NOTE — TELEPHONE ENCOUNTER
Medication:   Requested Prescriptions     Pending Prescriptions Disp Refills    benazepril (LOTENSIN) 40 MG tablet [Pharmacy Med Name: BENAZEPRIL HCL 40 MG TABLET] 90 tablet 2     Sig: TAKE ONE TABLET BY MOUTH EVERY EVENING       Last Filled:      Patient Phone Number: [phone] (home)     Last appt: 11/3/2020   Next appt: Visit date not found    Last BMP:   Lab Results   Component Value Date     10/19/2020    K 3.4 10/19/2020     10/19/2020    CO2 26 10/19/2020    ANIONGAP 11 10/19/2020    GLUCOSE 159 10/19/2020    GLUCOSE 97 05/03/2012    BUN 11 10/19/2020    CREATININE 0.83 10/19/2020    LABGLOM >60 05/22/2020    LABGLOM 66 05/03/2012    LABGLOM 54 05/03/2012    GFRAA >60 05/22/2020    GFRAA >60 06/11/2013    CALCIUM 9.0 10/19/2020      Last CMP:   Lab Results   Component Value Date     10/19/2020    K 3.4 10/19/2020     10/19/2020    CO2 26 10/19/2020    ANIONGAP 11 10/19/2020    GLUCOSE 159 10/19/2020    GLUCOSE 97 05/03/2012    BUN 11 10/19/2020    CREATININE 0.83 10/19/2020    LABGLOM >60 05/22/2020    LABGLOM 66 05/03/2012    LABGLOM 54 05/03/2012    GFRAA >60 05/22/2020    GFRAA >60 06/11/2013    PROT 6.5 08/12/2019    PROT 6.9 12/04/2012    LABALBU 4.0 10/18/2020    LABALBU 3.8 07/20/2010    AGRATIO 2.1 05/21/2015    BILITOT 0.9 08/12/2019    ALKPHOS 46 08/12/2019    ALT 29 08/12/2019    AST 22 08/12/2019    GLOB 2.1 05/21/2015     Last Renal Function:   Lab Results   Component Value Date     10/19/2020    K 3.4 10/19/2020     10/19/2020    CO2 26 10/19/2020    GLUCOSE 159 10/19/2020    GLUCOSE 97 05/03/2012    BUN 11 10/19/2020    CREATININE 0.83 10/19/2020    PHOS 3.2 10/18/2020    LABALBU 4.0 10/18/2020    LABALBU 3.8 07/20/2010    CALCIUM 9.0 10/19/2020    GFR 74 10/19/2020    GFR 64 10/19/2020    GFRAA >60 05/22/2020    GFRAA >60 06/11/2013       Last OARRS:   RX Monitoring 6/3/2019   Attestation The Prescription Monitoring Report for this patient was reviewed today.   Periodic Controlled Substance Monitoring -       Preferred Pharmacy:   [facility] [place] [address], [address] [phone] [place] Number [phone]  [address] [facility] [address]  Phone: [phone] Fax: [phone]  Chanel Nieto. [facility], [address] [place] [address] [phone] -  [phone]  [address] [place] [address]  Phone: [phone] Fax: [phone]

## 2020-12-17 ENCOUNTER — TELEPHONE (OUTPATIENT)
Dept: PRIMARY CARE CLINIC | Age: 85
End: 2020-12-17
Payer: MEDICARE

## 2020-12-17 PROCEDURE — G0180 MD CERTIFICATION HHA PATIENT: HCPCS | Performed by: FAMILY MEDICINE

## 2020-12-17 NOTE — TELEPHONE ENCOUNTER
Pt voices concerns of [facility] telling her she is on a automatic refill of furosemide (LASIX) 20 MG tablet   Pt states she doesn't think she is  still taking her medication.

## 2020-12-18 NOTE — TELEPHONE ENCOUNTER
Called patient [phone] . Let her know Do not  the med. She has been taken off of it. Patient exhibited clear understanding.

## 2021-01-14 ENCOUNTER — TELEPHONE (OUTPATIENT)
Dept: PRIMARY CARE CLINIC | Age: 86
End: 2021-01-14
Payer: MEDICARE

## 2021-01-14 DIAGNOSIS — N39.0 URINARY TRACT INFECTION WITHOUT HEMATURIA, SITE UNSPECIFIED: Primary | ICD-10-CM

## 2021-01-14 PROCEDURE — G0180 MD CERTIFICATION HHA PATIENT: HCPCS | Performed by: FAMILY MEDICINE

## 2021-01-20 ENCOUNTER — IMMUNIZATION (OUTPATIENT)
Dept: PRIMARY CARE CLINIC | Age: 86
End: 2021-01-20
Payer: MEDICARE

## 2021-01-20 PROCEDURE — 0001A COVID-19, PFIZER VACCINE 30MCG/0.3ML DOSE: CPT | Performed by: NURSE PRACTITIONER

## 2021-01-20 PROCEDURE — 91300 COVID-19, PFIZER VACCINE 30MCG/0.3ML DOSE: CPT | Performed by: NURSE PRACTITIONER

## 2021-02-10 ENCOUNTER — IMMUNIZATION (OUTPATIENT)
Dept: PRIMARY CARE CLINIC | Age: 86
End: 2021-02-10
Payer: MEDICARE

## 2021-02-10 PROCEDURE — 0002A COVID-19, PFIZER VACCINE 30MCG/0.3ML DOSE: CPT | Performed by: FAMILY MEDICINE

## 2021-02-10 PROCEDURE — 91300 COVID-19, PFIZER VACCINE 30MCG/0.3ML DOSE: CPT | Performed by: FAMILY MEDICINE

## 2021-02-10 NOTE — TELEPHONE ENCOUNTER
Medication:   Requested Prescriptions     Pending Prescriptions Disp Refills    fluticasone (FLONASE) 50 MCG/ACT nasal spray [Pharmacy Med Name: FLUTICASONE PROP NASAL SPRAY 16GM 50MCG] 48 g 3     Sig: USE 2 SPRAYS NASALLY DAILY     Last Filled:  10/23/20    Last appt: 11/3/2020   Next appt: Visit date not found

## 2021-02-11 RX ORDER — FLUTICASONE PROPIONATE 50 MCG
SPRAY, SUSPENSION (ML) NASAL
Qty: 48 G | Refills: 3 | Status: SHIPPED | OUTPATIENT
Start: 2021-02-11 | End: 2022-04-01

## 2021-04-15 ENCOUNTER — OFFICE VISIT (OUTPATIENT)
Dept: PRIMARY CARE CLINIC | Age: 86
End: 2021-04-15
Payer: MEDICARE

## 2021-04-15 VITALS
BODY MASS INDEX: 35.6 KG/M2 | DIASTOLIC BLOOD PRESSURE: 100 MMHG | SYSTOLIC BLOOD PRESSURE: 160 MMHG | OXYGEN SATURATION: 96 % | HEART RATE: 82 BPM | TEMPERATURE: 98.1 F | RESPIRATION RATE: 14 BRPM | WEIGHT: 207.4 LBS

## 2021-04-15 DIAGNOSIS — R29.6 FALLS FREQUENTLY: ICD-10-CM

## 2021-04-15 DIAGNOSIS — R55 NEAR SYNCOPE: Primary | ICD-10-CM

## 2021-04-15 DIAGNOSIS — R26.81 UNSTEADY GAIT: ICD-10-CM

## 2021-04-15 DIAGNOSIS — I10 ESSENTIAL HYPERTENSION: ICD-10-CM

## 2021-04-15 PROCEDURE — 99214 OFFICE O/P EST MOD 30 MIN: CPT | Performed by: FAMILY MEDICINE

## 2021-04-15 PROCEDURE — 93000 ELECTROCARDIOGRAM COMPLETE: CPT | Performed by: FAMILY MEDICINE

## 2021-04-15 ASSESSMENT — ENCOUNTER SYMPTOMS
GASTROINTESTINAL NEGATIVE: 1
EYES NEGATIVE: 1
RESPIRATORY NEGATIVE: 1

## 2021-04-15 ASSESSMENT — PATIENT HEALTH QUESTIONNAIRE - PHQ9
SUM OF ALL RESPONSES TO PHQ QUESTIONS 1-9: 0
SUM OF ALL RESPONSES TO PHQ9 QUESTIONS 1 & 2: 0

## 2021-04-15 NOTE — PROGRESS NOTES
SUBJECTIVE:  Patient ID: Stevenson Jorge is a 80 y.o. y.o. female     HPI   Ms.  Amanda Francisco is here for follow-up no recent emergency room visit due to fall that was a second 1 within a few weeks she has been having abnormal gait she is with prior chronic back pain weakness,  Her  was dropping off at [employer] and she walked out of the car he was getting her walker for her in between she fell and hit her head on the ground, she cannot remember feeling dizzy lightheaded with chest pain or shortness of breath or palpitation but it was sudden according to her story she did not feel any warning sign and prior to that it did happen once where she just fell there is no LOC,  To have a headache and not feeling good  She had a CT scan was okay no acute changes she had a hematoma getting better sore tender to touch  She feels a little weak in general she wants to see cardiologist her  sees Dr. Jayy Foss  Patient with hypertension needs normal today follow-up I she is been taking her medication as instructed  She been having issue with her back pain has not seen any physical therapy recently  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 replacement therapy     Rectocele 1970s    Seasonal allergies     Sleep apnea 2010    Spinal stenosis of lumbar region     Gets epidural injections every 6 months      Past Surgical History:   Procedure Laterality Date    BREAST BIOPSY Right 5/3/13    biopsy    BREAST LUMPECTOMY  9/27/05    right lumpectomy    CARDIAC CATHETERIZATION [address] REMOVAL  1/08    bilateral    COLONOSCOPY  2005    30, R7X 7X 2005    CYSTOCELE REPAIR  1989    DILATION AND CURETTAGE OF UTERUS  yrs ago    DOPPLER ECHOCARDIOGRAPHY  2007    EYE SURGERY      bilat catarats    HYSTERECTOMY  4/1974    JOINT REPLACEMENT      left hip    KNEE SURGERY  1991    L    KNEE SURGERY  1993    R    MOHS SURGERY  2/08    nose    OTHER SURGICAL HISTORY  12/22/06    epidural    RECTOCELE REPAIR  1989    SUTURE REMOVAL  june 2004    sutures in 3 fingers    TONSILLECTOMY      TOTAL HIP ARTHROPLASTY  2006    L     Family History   Problem Relation Age of Onset    Cancer Brother         Prostate    Cancer Mother         Stomach Cancer at age 80    Stroke Father     Cancer Paternal Grandmother         intraadbominal at age 80    Cancer Other         Leukemia age 70     Social History     Socioeconomic History    Marital status:      Spouse name: Ahmad Rinne Number of children: 2    Years of education: Not on file    Highest education level: Not on file   Occupational History     Employer: RETIRED   Social Needs    Financial resource strain: Not hard at all   [employer] insecurity     Worry: Never true     Inability: Never true    Transportation needs     Medical: No     Non-medical: No   Tobacco Use    Smoking status: Never Smoker    Smokeless tobacco: Never Used   Substance and Sexual Activity    Alcohol use: Yes     Comment: very rare occasions    Drug use: No    Sexual activity: Yes     Partners: Male   Lifestyle    Physical activity     Days per week: Not on file     Minutes per session: Not on file    Stress: Not on file   Relationships    Social connections     Talks on phone: Not on file     Gets together: Not on file     Attends Evangelical service: Not on file     Active member of club or organization: Not on file     Attends meetings of clubs or organizations: Not on file     Relationship status: Not on file    Intimate partner violence Fear of current or ex partner: Not on file     Emotionally abused: Not on file     Physically abused: Not on file     Forced sexual activity: Not on file   Other Topics Concern    Not on file   Social History Narrative    Not on file     Current Outpatient Medications   Medication Sig Dispense Refill    fluticasone (FLONASE) 50 MCG/ACT nasal spray USE 2 SPRAYS NASALLY DAILY 48 g 3    benazepril (LOTENSIN) 40 MG tablet TAKE ONE TABLET BY MOUTH EVERY EVENING 90 tablet 2    vitamin B-6 (PYRIDOXINE) 100 MG tablet Take 100 mg by mouth daily      vitamin E 400 UNIT capsule Take 400 Units by mouth daily      metoprolol tartrate (LOPRESSOR) 25 MG tablet TAKE 1 TABLET TWICE A  tablet 3    pravastatin (PRAVACHOL) 20 MG tablet TAKE 1 TABLET DAILY 90 tablet 3    pantoprazole (PROTONIX) 40 MG tablet TAKE 1 TABLET DAILY 90 tablet 3    DULoxetine (CYMBALTA) 60 MG extended release capsule TAKE 1 CAPSULE DAILY 90 capsule 4    ergocalciferol (ERGOCALCIFEROL) 17750 units capsule Take 50,000 Units by mouth      Calcium Polycarbophil (FIBER-CAPS PO) Take by mouth      Multiple Vitamins-Minerals (VISION VITAMINS PO) Take by mouth      meclizine (ANTIVERT) 25 MG tablet Take 25 mg by mouth 2 times daily.  naproxen sodium (ANAPROX) 220 MG tablet Take 220 mg by mouth 2 times daily (with meals)      modafinil (PROVIGIL) 200 MG tablet Take 200 mg by mouth daily. No current facility-administered medications for this visit. Allergies   Allergen Reactions    Lodine [Etodolac] Rash       Review of Systems   Constitutional: Positive for fatigue. HENT: Negative. Eyes: Negative. Respiratory: Negative. Cardiovascular: Negative. Gastrointestinal: Negative. Neurological: Positive for dizziness, weakness, light-headedness and headaches. Negative for tremors, seizures, syncope, speech difficulty and numbness. All other systems reviewed and are negative.       OBJECTIVE:  BP (!) 162/100 (Site: Left Upper Arm, Position: Sitting, Cuff Size: Large Adult)   Pulse 82   Temp 98.1 °F (36.7 °C) (Oral)   Resp 14   Wt 207 lb 6.4 oz (94.1 kg)   SpO2 96%   BMI 35.60 kg/m²     Physical Exam  Vitals signs reviewed. Constitutional:       Appearance: Normal appearance. HENT:      Head:     Eyes:      General: No scleral icterus. Conjunctiva/sclera: Conjunctivae normal.   Neck:      Thyroid: No thyromegaly. Vascular: No JVD. Cardiovascular:      Rate and Rhythm: Normal rate and regular rhythm. Heart sounds: Normal heart sounds. No murmur. No friction rub. No gallop. Pulmonary:      Effort: Pulmonary effort is normal.      Breath sounds: Normal breath sounds. No wheezing or rales. Abdominal:      General: Bowel sounds are normal. There is no distension. Palpations: Abdomen is soft. There is no mass. Tenderness: There is no abdominal tenderness. Hernia: No hernia is present. Lymphadenopathy:      Cervical: No cervical adenopathy. Skin:     Findings: No rash. Neurological:      Mental Status: She is alert and oriented to person, place, and time. She tried to stand up from the exam table with the help of her walker, it took several attempts able to stand up    ASSESSMENT:     Diagnosis Orders   1. Near syncope  [address] [place] [address]   2. Falls frequently  EKG 12 lead    EKG 12 lead    [address] [place] [address]   3. Essential hypertension     4.  Unsteady gait         PLAN:  See orders  Safety issues discussed with the patient  Reviewed her ER record and imaging blood work  Increase blood pressure medication Toprol to twice daily  Blood pressure monitor at home  Close follow-up

## 2021-04-23 ENCOUNTER — HOSPITAL ENCOUNTER (OUTPATIENT)
Dept: PHYSICAL THERAPY | Age: 86
Setting detail: THERAPIES SERIES
Discharge: HOME | End: 2021-04-23
Payer: MEDICARE

## 2021-04-23 NOTE — FLOWSHEET NOTE
[address] [facility]    Physical Therapy  Cancellation/No-show Note  Patient Name:  Nba Subramanian  :  1934   Date:  2021    Cancelled visits to date: 0  No-shows to date: 1    For today's appointment patient:  []  Cancelled  []  Rescheduled appointment  [x]  No-show for eval      Reason given by patient:  []  Patient ill  []  Conflicting appointment  []  No transportation    []  Conflict with work  []  No reason given  []  Other:     Comments:      Phone call information:   []  Phone call made today to patient at _ time at number provided:      []  Patient answered, conversation as follows:    []  Patient did not answer, message left as follows:  [x]  Phone call not made today  []  Phone call not needed - pt contacted us to cancel and provided reason for cancellation.      Electronically signed by:  Gerald Gomez PT

## 2021-04-30 ENCOUNTER — TELEPHONE (OUTPATIENT)
Dept: PRIMARY CARE CLINIC | Age: 86
End: 2021-04-30

## 2021-04-30 DIAGNOSIS — R29.6 FALLS FREQUENTLY: Primary | ICD-10-CM

## 2021-04-30 NOTE — TELEPHONE ENCOUNTER
----- Message from Yusuf Ellison sent at 4/30/2021 10:11 AM EDT -----  Subject: Message to Provider    QUESTIONS  Information for Provider? Patient has been falling and she received a   referral to a specialist but patient's  Kindra Olivo does not remember what   kind of Dr. she needs to see. Could you please call Kindra Olivo with more   information on this referral?  ---------------------------------------------------------------------------  --------------  CALL BACK INFO  What is the best way for the office to contact you? OK to leave message on   voicemail  Preferred Call Back Phone Number? [phone]  ---------------------------------------------------------------------------  --------------  SCRIPT ANSWERS  Relationship to Patient? Other  Representative Name? Cinthia Thomas  Is the Representative on the appropriate HIPAA document in Epic?  Yes

## 2021-04-30 NOTE — TELEPHONE ENCOUNTER
Reached back out to pt  and gave information for referrals. He mentioned he talked to someone at [facility] about another doctor to help with her falls different from cardiologist and physical therapist but could not remember what.  Advised him to try and reach back out to that person and see if they can give exact type of doctor, and give us that information

## 2021-04-30 NOTE — TELEPHONE ENCOUNTER
----- Message from Dana Jaime sent at 4/30/2021 10:11 AM EDT -----  Subject: Message to Provider    QUESTIONS  Information for Provider? Patient has been falling and she received a   referral to a specialist but patient's  Jr Caballero does not remember what   kind of Dr. she needs to see. Could you please call Jr Caballero with more   information on this referral?  ---------------------------------------------------------------------------  --------------  CALL BACK INFO  What is the best way for the office to contact you? OK to leave message on   voicemail  Preferred Call Back Phone Number? [phone]  ---------------------------------------------------------------------------  --------------  SCRIPT ANSWERS  Relationship to Patient? Other  Representative Name? Peyton Wilkinson  Is the Representative on the appropriate HIPAA document in Epic?  Yes

## 2021-04-30 NOTE — TELEPHONE ENCOUNTER
Spoke with , advised referrals for falls were for cardio and PT said he has those, pt  said at hospital they suggested neuro, advised that I did not have access to [facility] referrals and he should call them for that info, pt understood

## 2021-05-03 ENCOUNTER — TELEPHONE (OUTPATIENT)
Dept: PRIMARY CARE CLINIC | Age: 86
End: 2021-05-03

## 2021-05-03 RX ORDER — ERGOCALCIFEROL (VITAMIN D2) 1250 MCG
50000 CAPSULE ORAL WEEKLY
Qty: 4 CAPSULE | Refills: 1 | Status: SHIPPED | OUTPATIENT
Start: 2021-05-03 | End: 2021-05-24 | Stop reason: SDUPTHER

## 2021-05-03 NOTE — TELEPHONE ENCOUNTER
Pt needs to know if they are supposed to be on Ergocalciferol 5000 units capsule? If so she needs a refill.       Jena Bustamante [address] [place] [address] Juan [address]. [address]   Phone:  [phone]  Fax:  [phone]

## 2021-05-03 NOTE — TELEPHONE ENCOUNTER
----- Message from Gaviota Echols sent at 4/30/2021 10:11 AM EDT -----  Subject: Message to Provider    QUESTIONS  Information for Provider? Patient has been falling and she received a   referral to a specialist but patient's  Lavonne Roman does not remember what   kind of Dr. she needs to see. Could you please call Lavonne Roman with more   information on this referral?  ---------------------------------------------------------------------------  --------------  CALL BACK INFO  What is the best way for the office to contact you? OK to leave message on   voicemail  Preferred Call Back Phone Number? [phone]  ---------------------------------------------------------------------------  --------------  SCRIPT ANSWERS  Relationship to Patient? Other  Representative Name? Ally Burdick  Is the Representative on the appropriate HIPAA document in Epic?  Yes

## 2021-05-14 ENCOUNTER — OFFICE VISIT (OUTPATIENT)
Dept: PRIMARY CARE CLINIC | Age: 86
End: 2021-05-14
Payer: MEDICARE

## 2021-05-14 VITALS
BODY MASS INDEX: 35.34 KG/M2 | HEIGHT: 64 IN | WEIGHT: 207 LBS | DIASTOLIC BLOOD PRESSURE: 86 MMHG | TEMPERATURE: 98.4 F | SYSTOLIC BLOOD PRESSURE: 126 MMHG | HEART RATE: 59 BPM | OXYGEN SATURATION: 96 %

## 2021-05-14 DIAGNOSIS — Z00.00 ROUTINE GENERAL MEDICAL EXAMINATION AT A HEALTH CARE FACILITY: Primary | ICD-10-CM

## 2021-05-14 PROCEDURE — G0439 PPPS, SUBSEQ VISIT: HCPCS | Performed by: FAMILY MEDICINE

## 2021-05-14 ASSESSMENT — PATIENT HEALTH QUESTIONNAIRE - PHQ9
SUM OF ALL RESPONSES TO PHQ QUESTIONS 1-9: 0
SUM OF ALL RESPONSES TO PHQ QUESTIONS 1-9: 0
2. FEELING DOWN, DEPRESSED OR HOPELESS: 0

## 2021-05-14 ASSESSMENT — LIFESTYLE VARIABLES: HOW OFTEN DO YOU HAVE A DRINK CONTAINING ALCOHOL: 0

## 2021-05-14 NOTE — PATIENT INSTRUCTIONS
Personalized Preventive Plan for Natalie Berry - 5/14/2021  Medicare offers a range of preventive health benefits. Some of the tests and screenings are paid in full while other may be subject to a deductible, co-insurance, and/or copay. Some of these benefits include a comprehensive review of your medical history including lifestyle, illnesses that may run in your family, and various assessments and screenings as appropriate. After reviewing your medical record and screening and assessments performed today your provider may have ordered immunizations, labs, imaging, and/or referrals for you. A list of these orders (if applicable) as well as your Preventive Care list are included within your After Visit Summary for your review. Other Preventive Recommendations:    · A preventive eye exam performed by an eye specialist is recommended every 1-2 years to screen for glaucoma; cataracts, macular degeneration, and other eye disorders. · A preventive dental visit is recommended every 6 months. · Try to get at least 150 minutes of exercise per week or 10,000 steps per day on a pedometer . · Order or download the FREE \"Exercise & Physical Activity: Your Everyday Guide\" from [employer]. Call 6-923.823.2520 or search [employer] online. · You need 1443-0481 mg of calcium and 0237-1664 IU of vitamin D per day. It is possible to meet your calcium requirement with diet alone, but a vitamin D supplement is usually necessary to meet this goal.  · When exposed to the sun, use a sunscreen that protects against both UVA and UVB radiation with an SPF of 30 or greater. Reapply every 2 to 3 hours or after sweating, drying off with a towel, or swimming. · Always wear a seat belt when traveling in a car. Always wear a helmet when riding a bicycle or motorcycle.

## 2021-05-14 NOTE — PROGRESS NOTES
therapy, Rectocele, Seasonal allergies, Sleep apnea, and Spinal stenosis of lumbar region. SurgHx:  has a past surgical history that includes doppler echocardiography (2007); Cardiac catheterization (1995); Tonsillectomy; Cataract removal (1/08); Cystocele repair (1989); Rectocele repair (1989); Dilation and curettage of uterus (yrs ago); Hysterectomy (4/1974); Total hip arthroplasty (2006); knee surgery (1991); knee surgery (1993); Colonoscopy (2005); Mohs surgery (2/08); Suture Removal (june 2004); other surgical history (12/22/06); joint replacement; eye surgery; Breast lumpectomy (9/27/05); and Breast biopsy (Right, 5/3/13). SocHx:  reports that she has never smoked. She has never used smokeless tobacco. She reports current alcohol use. She reports that she does not use drugs. FamHx: No family history of premature coronary artery disease, sudden death, or aneurysm  Allergies: Lodine [etodolac]   ROS:   Review of Systems   Constitutional: Positive for fatigue. Negative for activity change, diaphoresis and fever. HENT: Negative for congestion and ear discharge. Eyes: Negative for photophobia and visual disturbance. Respiratory: Negative for cough, chest tightness and shortness of breath. Cardiovascular: Positive for palpitations and leg swelling. Negative for chest pain. Gastrointestinal: Positive for nausea. Negative for abdominal distention, abdominal pain and blood in stool. Endocrine: Negative for cold intolerance and polydipsia. Genitourinary: Negative for difficulty urinating and flank pain. Musculoskeletal: Positive for arthralgias, back pain, gait problem and myalgias. Skin: Negative for rash and wound. Allergic/Immunologic: Negative for environmental allergies and immunocompromised state. Neurological: Positive for dizziness, weakness and numbness. Negative for headaches. Hematological: Negative for adenopathy. Does not bruise/bleed easily.    Psychiatric/Behavioral: Negative for confusion. The patient is not hyperactive. MEDICATIONS      Prior to Admission medications    Medication Sig Start Date End Date Taking? Authorizing Provider   DULoxetine (CYMBALTA) 60 MG extended release capsule TAKE 1 CAPSULE DAILY 5/10/21  Yes Tammy Vaughan MD   ergocalciferol (ERGOCALCIFEROL) 1.25 MG (33220 UT) capsule Take 1 capsule by mouth once a week 5/3/21  Yes Tammy Vaughan MD   metoprolol tartrate (LOPRESSOR) 25 MG tablet Take 2 tablets by mouth 2 times daily 4/15/21  Yes Tammy Vaughan MD   fluticasone (FLONASE) 50 MCG/ACT nasal spray USE 2 SPRAYS NASALLY DAILY 2/11/21  Yes Tammy Vaughan MD   benazepril (LOTENSIN) 40 MG tablet TAKE ONE TABLET BY MOUTH EVERY EVENING 12/8/20  Yes Tammy Vaughan MD   naproxen sodium (ANAPROX) 220 MG tablet Take 220 mg by mouth 2 times daily (with meals)   Yes Historical Provider, MD   vitamin B-6 (PYRIDOXINE) 100 MG tablet Take 100 mg by mouth daily   Yes Historical Provider, MD   vitamin E 400 UNIT capsule Take 400 Units by mouth daily   Yes Historical Provider, MD   pravastatin (PRAVACHOL) 20 MG tablet TAKE 1 TABLET DAILY 6/2/20  Yes Tammy Vaughan MD   pantoprazole (PROTONIX) 40 MG tablet TAKE 1 TABLET DAILY 4/10/20  Yes Tammy Vaughan MD   modafinil (PROVIGIL) 200 MG tablet Take 200 mg by mouth daily. Yes Historical Provider, MD   Calcium Polycarbophil (FIBER-CAPS PO) Take by mouth   Yes Historical Provider, MD   Multiple Vitamins-Minerals (VISION VITAMINS PO) Take by mouth   Yes Historical Provider, MD   meclizine (ANTIVERT) 25 MG tablet Take 25 mg by mouth 2 times daily.    Yes Historical Provider, MD       PHYSICAL EXAM        Vitals:    05/17/21 1407   BP: 132/78   Pulse: 77   Resp: 18   SpO2: 98%    Weight: 208 lb 14.4 oz (94.8 kg)     Gen Alert, cooperative, no distress, frail, using walker Heart  Regular rate and rhythm, 1/6 systolic murmur   Head Normocephalic, atraumatic, no abnormalities Abd  Soft, NT, +BS, no mass, no OM   Eyes PERRLA, conj/corn clear Ext  Ext nl, AT, no C/C,  edema   Nose Nares normal, no drain age, Non-tender Pulse 2+ and symmetric   Throat Lips, mucosa, tongue normal Skin Color/text/turg nl, no rash/lesions   Neck S/S, TM, NT, no bruit Psych Nl mood and affect   Lung CTA-B, unlabored, no DTP     Ch wall NT, no deform       LABS and Imaging     Relevant and available CV data reviewed  Echo/MRI: 2020 - Left ventricle: The cavity size is normal. Wall thickness is normal. Systolic function was normal.  The estimated ejection fraction was in the range of 55% to 60%. Wall motion was normal; there  were   no regional wall motion abnormalities. Left ventricular diastolic function parameters were  Normal. - Mitral valve: Mild regurgitation.- Right ventricle: Systolic function was normal. - Tricuspid valve: Mild-moderate regurgitation. - Pericardium, extracardiac: A trivial pericardial effusion is identified. Cath: none  Holter:none  [id number]  Inferior q. Personally interpreted  Stress:   moderateRisk  Moderate Complexity/Medical Decision Making  Outside records Reviewed  Labs Reviewed  Prior Imaging, ekg,  echo reviewed when available  Medications reviewed  Old Notes reviewed  ASSESSMENT AND PLAN     1. Near syncope/falls  - new problem  - differential: likely multifactorial from impaired proprioception with neuropathy from spinal stenosis/arthritis/joint mobility with betablocker and deconditioning. Less likely valvular disease or conduction disease  Plan  - echo to evaluate for valvular disease  - monitor to evaluate for pauses or conduction diseae  - decrease metoprolol to 12.5 mg twice a day given that patient stool up and felt dizzy  - refer to PT for strengthening ( would like to go to [place])    2. Generalized weakness  - new problem  Plan:  - referral to physical therapy    3. Essential Hypertension  - 132/78  - on lotensin 40 mg daily  - stable  Plan  - continue to monitor    4.  Mixed Hyperlipidemia  - on pravachol 20 mg daily  -stable  Plan  - continue above      Patient counseled on lifestyle modification, diet, and exercise. Follow Up:  3 months    Dr. Bertha Be:  I, Amber Jimenez, am scribing for and in the presence of Terrell Esposito 05/17/21 2:46 PM   Physician Attestation  The scribe for and in the presence of rex Wells DO). The scribe Danni Samano may have prepopulated components of this note with my historical  intellectual property under my direct supervision. Any additions to this intellectual property were performed in my presence and at my direction.   Furthermore, the content and accuracy of this note have been reviewed by rex Wells DO).  5/19/2021 9:21 PM

## 2021-05-14 NOTE — PROGRESS NOTES
Medicare Annual Wellness Visit  Name: Corby Ramirez Date: 2021   MRN: [id number] Sex: Female   Age: 80 y.o. Ethnicity: Non-/Non    : 1934 Race: Varinder Florence is here for Medicare AWV    Screenings for behavioral, psychosocial and functional/safety risks, and cognitive dysfunction are all negative except as indicated below. These results, as well as other patient data from the [address] form, are documented in Flowsheets linked to this Encounter. Allergies   Allergen Reactions    Lodine [Etodolac] Rash       Prior to Visit Medications    Medication Sig Taking? Authorizing Provider   DULoxetine (CYMBALTA) 60 MG extended release capsule TAKE 1 CAPSULE DAILY Yes Roddy Perkins MD   ergocalciferol (ERGOCALCIFEROL) 1.25 MG (61621 UT) capsule Take 1 capsule by mouth once a week Yes Roddy Perkins MD   metoprolol tartrate (LOPRESSOR) 25 MG tablet Take 2 tablets by mouth 2 times daily Yes Roddy Perkins MD   fluticasone (FLONASE) 50 MCG/ACT nasal spray USE 2 SPRAYS NASALLY DAILY Yes Roddy Perkins MD   benazepril (LOTENSIN) 40 MG tablet TAKE ONE TABLET BY MOUTH EVERY EVENING Yes Roddy Perkins MD   vitamin B-6 (PYRIDOXINE) 100 MG tablet Take 100 mg by mouth daily Yes Historical Provider, MD   vitamin E 400 UNIT capsule Take 400 Units by mouth daily Yes Historical Provider, MD   pravastatin (PRAVACHOL) 20 MG tablet TAKE 1 TABLET DAILY Yes Roddy Perkins MD   pantoprazole (PROTONIX) 40 MG tablet TAKE 1 TABLET DAILY Yes Roddy Perkins MD   modafinil (PROVIGIL) 200 MG tablet Take 200 mg by mouth daily. Yes Historical Provider, MD   Multiple Vitamins-Minerals (VISION VITAMINS PO) Take by mouth Yes Historical Provider, MD   meclizine (ANTIVERT) 25 MG tablet Take 25 mg by mouth 2 times daily.  Yes Historical Provider, MD   naproxen sodium (ANAPROX) 220 MG tablet Take 220 mg by mouth 2 times daily (with meals)  Historical Provider, MD   Calcium Polycarbophil (FIBER-CAPS PO) Take by mouth Historical Provider, MD       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 replacement therapy     Rectocele 1970s    Seasonal allergies     Sleep apnea 2010    Spinal stenosis of lumbar region     Gets epidural injections every 6 months       Past Surgical History:   Procedure Laterality Date    BREAST BIOPSY Right 5/3/13    biopsy    BREAST LUMPECTOMY  9/27/05    right lumpectomy   [address] REMOVAL  1/08    bilateral    COLONOSCOPY  2005    30, R7X 7X 2005    CYSTOCELE REPAIR  1989    DILATION AND CURETTAGE OF UTERUS  yrs ago    DOPPLER ECHOCARDIOGRAPHY  2007    EYE SURGERY      bilat catarats    HYSTERECTOMY  4/1974    JOINT REPLACEMENT      left hip   [address]        MOHS SURGERY  2/08    nose    OTHER SURGICAL HISTORY  12/22/06    epidural    [facility]    SUTURE REMOVAL  june 2004    sutures in 3 fingers    TONSILLECTOMY      TOTAL HIP ARTHROPLASTY  2006    L       Family History   Problem Relation Age of Onset    Cancer Brother         Prostate    Cancer Mother         Stomach Cancer at age 80    Stroke Father     Cancer Paternal Grandmother         intraadbominal at age 80    Cancer Other         Leukemia age 70       CareTeam (Including outside providers/suppliers regularly involved in providing care):   Patient Care Team:  Sebastián Littlejohn MD as PCP - General (Family Medicine)  Sebastián Littlejohn MD [facility] PCP [facility] Empaneled Provider  Triny Zuluaga MD as Consulting Physician (Otolaryngology)  Gregory Cristina MD as Consulting Physician (Sleep Medicine)    Wt Readings from Last 3 Encounters:   05/14/21 207 lb (93.9 kg)   04/15/21 207 lb 6.4 oz (94.1 kg)   11/03/20 208 lb (94.3 kg)     Vitals:    05/14/21 1056   BP: 126/86   Site: Left Upper Arm   Position: Sitting   Cuff Size: Medium Adult   Pulse: 59   Temp: 98.4 °F (36.9 °C)   TempSrc: Oral   SpO2: 96%   Weight: 207 lb (93.9 kg)   Height: 5' 4\" (1.626 m)     Body mass index is 35.53 kg/m². Based upon direct observation of the patient, evaluation of cognition reveals global memory impairment noted. General Appearance: alert and oriented to person, place and time, well developed and well- nourished, in no acute distress  Skin: warm and dry, no rash or erythema  Head: normocephalic and atraumatic  Eyes: pupils equal, round, and reactive to light, extraocular eye movements intact, conjunctivae normal  ENT: tympanic membrane, external ear and ear canal normal bilaterally, nose without deformity, nasal mucosa and turbinates normal without polyps  Neck: supple and non-tender without mass, no thyromegaly or thyroid nodules, no cervical lymphadenopathy  Pulmonary/Chest: clear to auscultation bilaterally- no wheezes, rales or rhonchi, normal air movement, no respiratory distress  Cardiovascular: normal rate, regular rhythm, normal S1 and S2, no murmurs, rubs, clicks, or gallops, distal pulses intact, no carotid bruits  Abdomen: soft, non-tender, non-distended, normal bowel sounds, no masses or organomegaly  Extremities: no cyanosis, clubbing or edema  Musculoskeletal: normal range of motion, no joint swelling, deformity or tenderness  Neurologic: reflexes normal and symmetric, no cranial nerve deficit, gait, coordination and speech normal    Patient's complete Health Risk Assessment and screening values have been reviewed and are found in Flowsheets.  The following problems were reviewed today and where indicated follow up appointments were made and/or referrals ordered. Positive Risk Factor Screenings with Interventions:     Fall Risk:  Timed Up and Go Test > 12 seconds? (Complete if either Fall Risk answers are Yes): (!) yes  2 or more falls in past year?: (!) yes  Fall with injury in past year?: (!) yes  Fall Risk Interventions:    · Home safety tips provided    Cognitive: Words recalled: 2 Words Recalled  Clock Drawing Test (CDT) Score: (!) Abnormal  Total Score Interpretation: Positive Mini-Cog  Cognitive Impairment Interventions:  · Patient advised to follow-up in this office for further evaluation and treatment within 3 month(s)        Health Habits/Nutrition:  Health Habits/Nutrition  Do you exercise for at least 20 minutes 2-3 times per week?: (!) No  Have you lost any weight without trying in the past 3 months?: No  Do you eat only one meal per day?: No  Have you seen the dentist within the past year?: Yes  Body mass index: (!) 35.53  Health Habits/Nutrition Interventions:  · Inadequate physical activity:  patient agrees to exercise for at least 150 minutes/week    Hearing/Vision:  No exam data present  Hearing/Vision  Do you or your family notice any trouble with your hearing that hasn't been managed with hearing aids?: No  Do you have difficulty driving, watching TV, or doing any of your daily activities because of your eyesight?: No  Have you had an eye exam within the past year?: (!) No  Hearing/Vision Interventions:  · Hearing concerns:  she has hearing aid, she is followed by hearing doc  · Vision concerns:  patient encouraged to make appointment with his/her eye specialist     ADL:  ADLs  In the past 7 days, did you need help from others to perform any of the following everyday activities? Eating, dressing, grooming, bathing, toileting, or walking/balance?: (!) Walking/Balance  In the past 7 days, did you need help from others to take care of any of the following?  Laundry, housekeeping, banking/finances, shopping, telephone use, food preparation, transportation, or taking medications?: None  ADL Interventions:  · Patient declines any further evaluation/treatment for this issue    Personalized Preventive Plan   Current Health Maintenance Status  Immunization History   Administered Date(s) Administered    COVID-19, Berry Peter, PF, 30mcg/0.3mL 01/20/2021, 02/10/2021    Influenza Vaccine, unspecified formulation 09/16/2018    Influenza Virus Vaccine 12/20/2012    Influenza Whole 10/23/2008, 10/21/2009, 11/02/2010, 12/20/2012    Influenza, High Dose (Fluzone 65 yrs and older) 11/01/2012, 11/01/2013, 01/25/2016, 01/04/2017, 12/04/2017    Influenza, Quadv, IM, PF (6 mo and older Fluzone, Flulaval, Fluarix, and 3 yrs and older Afluria) 09/16/2018    Influenza, Triv, inactivated, subunit, adjuvanted, IM (Fluad 65 yrs and older) 09/15/2020    Pneumococcal Conjugate 13-valent (Qinrlzo66) 09/14/2015    Pneumococcal Conjugate 7-valent (Prevnar7) 09/14/2015    Pneumococcal Polysaccharide ([id number]) 01/01/2013    Td, unspecified formulation 07/01/2004    Zoster Live (Zostavax) 06/07/2012    Zoster Recombinant (Shingrix) 12/13/2019, 06/07/2020        Health Maintenance   Topic Date Due    DTaP/Tdap/Td vaccine (1 - Tdap) 01/19/1953    Lipid screen  08/12/2020    Annual Wellness Visit (AWV)  08/12/2021 (Originally 5/29/2019)    Potassium monitoring  10/19/2021    Creatinine monitoring  10/19/2021    Flu vaccine  Completed    Shingles Vaccine  Completed    Pneumococcal 65+ years Vaccine  Completed    COVID-19 Vaccine  Completed    Hepatitis A vaccine  Aged Out    Hepatitis B vaccine  Aged Out    Hib vaccine  Aged Out    Meningococcal (ACWY) vaccine  Aged Out     Recommendations for At The Pool Due: see orders and patient instructions/AVS.  .   Recommended screening schedule for the next 5-10 years is provided to the patient in written form: see Patient Instructions/AVS.    There are no diagnoses linked to this encounter.        Process given to get a full comprehensive neuropsych eval

## 2021-05-17 ENCOUNTER — OFFICE VISIT (OUTPATIENT)
Dept: CARDIOLOGY CLINIC | Age: 86
End: 2021-05-17
Payer: MEDICARE

## 2021-05-17 VITALS
HEART RATE: 77 BPM | RESPIRATION RATE: 18 BRPM | OXYGEN SATURATION: 98 % | DIASTOLIC BLOOD PRESSURE: 78 MMHG | SYSTOLIC BLOOD PRESSURE: 132 MMHG | WEIGHT: 208.9 LBS | BODY MASS INDEX: 34.81 KG/M2 | HEIGHT: 65 IN

## 2021-05-17 DIAGNOSIS — R55 NEAR SYNCOPE: Primary | ICD-10-CM
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
## 2021-05-17 DIAGNOSIS — R53.1 GENERALIZED WEAKNESS: ICD-10-CM

## 2021-05-17 DIAGNOSIS — I10 HYPERTENSION, UNSPECIFIED TYPE: ICD-10-CM

## 2021-05-17 DIAGNOSIS — E78.5 HYPERLIPIDEMIA, UNSPECIFIED HYPERLIPIDEMIA TYPE: ICD-10-CM

## 2021-05-17 PROCEDURE — 99204 OFFICE O/P NEW MOD 45 MIN: CPT | Performed by: INTERNAL MEDICINE

## 2021-05-17 PROCEDURE — 93225 XTRNL ECG REC<48 HRS REC: CPT | Performed by: INTERNAL MEDICINE

## 2021-05-17 NOTE — PROGRESS NOTES
Placed 48h Bardy monitor on the patient here in clinic today . Reviewed proper care and instructions with the patient.      SN : [id number]

## 2021-05-17 NOTE — PATIENT INSTRUCTIONS
- echo  - monitor- 48 hours  - decrease metoprolol to 12.5 mg twice a day  - refer to PT for strengthening

## 2021-05-17 NOTE — Clinical Note
I don't think her falls are syncope. Likely multifactorial from deconditioning, joint immobility, neuropathy from spinal stenosis and betablocker. Will decrease metoprolol, place event monitor, echo, and place PT referral. Thanks!   Erendira Jeffers

## 2021-05-18 ENCOUNTER — TELEPHONE (OUTPATIENT)
Dept: CARDIOLOGY CLINIC | Age: 86
End: 2021-05-18

## 2021-05-18 NOTE — TELEPHONE ENCOUNTER
has question about the change in dose of her metoprolol . Her pcp just increased her metoprolol to 25mg QID , but yesterday DKW decreased her metoprolol to 12.5mg BID.

## 2021-05-18 NOTE — TELEPHONE ENCOUNTER
Called and discussed with patient. She will remain on 12.5 twice a day. He verbalized understanding.  She is currently wearing a monitor, and will turn it in tomorrow

## 2021-05-19 ASSESSMENT — ENCOUNTER SYMPTOMS
BACK PAIN: 1
BLOOD IN STOOL: 0
ABDOMINAL PAIN: 0
SHORTNESS OF BREATH: 0
PHOTOPHOBIA: 0
NAUSEA: 1
ABDOMINAL DISTENTION: 0
CHEST TIGHTNESS: 0
COUGH: 0

## 2021-05-24 ENCOUNTER — TELEPHONE (OUTPATIENT)
Dept: CARDIOLOGY CLINIC | Age: 86
End: 2021-05-24

## 2021-05-24 ENCOUNTER — TELEPHONE (OUTPATIENT)
Dept: PRIMARY CARE CLINIC | Age: 86
End: 2021-05-24

## 2021-05-24 RX ORDER — ERGOCALCIFEROL (VITAMIN D2) 1250 MCG
50000 CAPSULE ORAL WEEKLY
Qty: 4 CAPSULE | Refills: 5 | Status: SHIPPED | OUTPATIENT
Start: 2021-05-24 | End: 2021-11-29

## 2021-05-25 PROCEDURE — 93227 XTRNL ECG REC<48 HR R&I: CPT | Performed by: INTERNAL MEDICINE

## 2021-05-26 DIAGNOSIS — K21.9 GASTROESOPHAGEAL REFLUX DISEASE WITHOUT ESOPHAGITIS: Chronic | ICD-10-CM

## 2021-05-26 NOTE — TELEPHONE ENCOUNTER
Medication:   Requested Prescriptions     Pending Prescriptions Disp Refills    pravastatin (PRAVACHOL) 20 MG tablet [Pharmacy Med Name: PRAVASTATIN TABS 20MG] 90 tablet 3     Sig: TAKE 1 TABLET DAILY    pantoprazole (PROTONIX) 40 MG tablet [Pharmacy Med Name: PANTOPRAZOLE SODIUM DR TABS 40MG] 90 tablet 3     Sig: TAKE 1 TABLET DAILY     Last Filled:  06/02/20 04/10/20    Last appt: 5/14/2021   Next appt: Visit date not found    Last OARRS:   RX Monitoring 6/3/2019   Attestation The Prescription Monitoring Report for this patient was reviewed today.    Periodic Controlled Substance Monitoring -

## 2021-05-27 RX ORDER — PRAVASTATIN SODIUM 20 MG
TABLET ORAL
Qty: 90 TABLET | Refills: 3 | Status: SHIPPED | OUTPATIENT
Start: 2021-05-27 | End: 2022-02-02 | Stop reason: SDUPTHER

## 2021-05-27 RX ORDER — PANTOPRAZOLE SODIUM 40 MG/1
TABLET, DELAYED RELEASE ORAL
Qty: 90 TABLET | Refills: 3 | Status: SHIPPED | OUTPATIENT
Start: 2021-05-27 | End: 2022-02-11 | Stop reason: SDUPTHER

## 2021-05-27 NOTE — TELEPHONE ENCOUNTER
Medication:   Requested Prescriptions     Pending Prescriptions Disp Refills    metoprolol tartrate (LOPRESSOR) 25 MG tablet [Pharmacy Med Name: METOPROLOL TARTRATE 25 MG TAB] 60 tablet 1     Sig: TAKE TWO TABLETS BY MOUTH TWICE A DAY        Last Filled:      Patient Phone Number: [phone] (home)     Last appt: 5/14/2021   Next appt: Visit date not found    Last OARRS:   RX Monitoring 6/3/2019   Attestation The Prescription Monitoring Report for this patient was reviewed today.    Periodic Controlled Substance Monitoring -       Preferred Pharmacy:   [address] [place] [facility] [phone] [facility] [phone]  [address] [place] [address]  Phone: [phone] Fax: [phone] - Emilia Metzger [address] [phone] - F [phone]  [address] [place] [address]  Phone: [phone] Fax: [phone]

## 2021-06-10 ENCOUNTER — HOSPITAL ENCOUNTER (OUTPATIENT)
Dept: NON INVASIVE DIAGNOSTICS | Age: 86
Discharge: HOME | End: 2021-06-10
Payer: MEDICARE

## 2021-06-10 DIAGNOSIS — I10 HYPERTENSION, UNSPECIFIED TYPE: ICD-10-CM
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
## 2021-06-10 DIAGNOSIS — R55 NEAR SYNCOPE: ICD-10-CM

## 2021-06-10 DIAGNOSIS — R53.1 GENERALIZED WEAKNESS: ICD-10-CM

## 2021-06-10 DIAGNOSIS — E78.5 HYPERLIPIDEMIA, UNSPECIFIED HYPERLIPIDEMIA TYPE: ICD-10-CM

## 2021-06-10 LAB
LV EF: 58 %
LVEF MODALITY: NORMAL

## 2021-06-10 PROCEDURE — 93306 TTE W/DOPPLER COMPLETE: CPT

## 2021-06-18 DIAGNOSIS — N28.1 RENAL CYST: Primary | ICD-10-CM

## 2021-06-28 ENCOUNTER — TELEPHONE (OUTPATIENT)
Dept: CARDIOLOGY CLINIC | Age: 86
End: 2021-06-28

## 2021-06-28 NOTE — TELEPHONE ENCOUNTER
When she had a recent heart test patient says DKW noticed a cyst on her kidney . She thought he wanted her to get a kidney test but no one has called her . Does she need any testing on her kidney ?

## 2021-06-30 NOTE — TELEPHONE ENCOUNTER
Medication:   Requested Prescriptions     Pending Prescriptions Disp Refills    metoprolol tartrate (LOPRESSOR) 25 MG tablet [Pharmacy Med Name: METOPROLOL TARTRATE 25 MG TAB] 60 tablet 0     Sig: TAKE TWO TABLETS BY MOUTH TWICE A DAY       Last Filled:      Patient Phone Number: [phone] (home)     Last appt: 5/14/2021   Next appt: Visit date not found    Last BMP:   Lab Results   Component Value Date     10/19/2020    K 3.4 10/19/2020     10/19/2020    CO2 26 10/19/2020    ANIONGAP 11 10/19/2020    GLUCOSE 159 10/19/2020    GLUCOSE 97 05/03/2012    BUN 11 10/19/2020    CREATININE 0.83 10/19/2020    LABGLOM >60 05/22/2020    LABGLOM 66 05/03/2012    LABGLOM 54 05/03/2012    GFRAA >60 05/22/2020    GFRAA >60 06/11/2013    CALCIUM 9.0 10/19/2020      Last CMP:   Lab Results   Component Value Date     10/19/2020    K 3.4 10/19/2020     10/19/2020    CO2 26 10/19/2020    ANIONGAP 11 10/19/2020    GLUCOSE 159 10/19/2020    GLUCOSE 97 05/03/2012    BUN 11 10/19/2020    CREATININE 0.83 10/19/2020    LABGLOM >60 05/22/2020    LABGLOM 66 05/03/2012    LABGLOM 54 05/03/2012    GFRAA >60 05/22/2020    GFRAA >60 06/11/2013    PROT 6.5 08/12/2019    PROT 6.9 12/04/2012    LABALBU 4.0 10/18/2020    LABALBU 3.8 07/20/2010    AGRATIO 2.1 05/21/2015    BILITOT 0.9 08/12/2019    ALKPHOS 46 08/12/2019    ALT 29 08/12/2019    AST 22 08/12/2019    GLOB 2.1 05/21/2015     Last Renal Function:   Lab Results   Component Value Date     10/19/2020    K 3.4 10/19/2020     10/19/2020    CO2 26 10/19/2020    GLUCOSE 159 10/19/2020    GLUCOSE 97 05/03/2012    BUN 11 10/19/2020    CREATININE 0.83 10/19/2020    PHOS 3.2 10/18/2020    LABALBU 4.0 10/18/2020    LABALBU 3.8 07/20/2010    CALCIUM 9.0 10/19/2020    GFR 74 10/19/2020    GFR 64 10/19/2020    GFRAA >60 05/22/2020    GFRAA >60 06/11/2013       Last OARRS:   RX Monitoring 6/3/2019   Attestation The Prescription Monitoring Report for this patient was reviewed today.    Periodic Controlled Substance Monitoring -       Preferred Pharmacy:   [facility] Number [address], [place] [facility] [phone] [facility] Number [phone]  [address] [facility] [address]  Phone: [phone] Fax: [phone] - [facility], [address] [place] [address] [phone] F [phone]  [address] [place] [address] [address] [address]  Phone: [phone] Fax: [phone]

## 2021-07-06 ENCOUNTER — TELEPHONE (OUTPATIENT)
Dept: PRIMARY CARE CLINIC | Age: 86
End: 2021-07-06

## 2021-07-06 NOTE — TELEPHONE ENCOUNTER
Patient called and stated that she went to [facility] for her ultrasound on her kidney, but when they looked at the paperwork it wasn't for the kidney at all. Patient needs a new order.      Thank you

## 2021-07-09 ENCOUNTER — TELEPHONE (OUTPATIENT)
Dept: CARDIOLOGY CLINIC | Age: 86
End: 2021-07-09

## 2021-07-09 NOTE — TELEPHONE ENCOUNTER
Received a call from [address] [place] [address],[address] that is requesting that we place an order for a renal ultrasound if the dx is Renal Cyst. I we are needing the patient to have abdominal aorta Limited they will need to have a different dx'd that will associate with that test. Please advise thanks for the patient came in over the weekend and was turned away due to incorrect order placement. Thanks .

## 2021-07-12 DIAGNOSIS — N28.1 RENAL CYST: Primary | ICD-10-CM

## 2021-07-12 NOTE — TELEPHONE ENCOUNTER
Doris Lizarraga returned my call. Notified her of Agustin Quiroz RN message below. Lilibeth verbalized understanding.

## 2021-07-19 NOTE — TELEPHONE ENCOUNTER
Medication:   Requested Prescriptions     Pending Prescriptions Disp Refills    metoprolol tartrate (LOPRESSOR) 25 MG tablet [Pharmacy Med Name: METOPROLOL TARTRATE 25 MG TAB] 60 tablet 0     Sig: TAKE TWO TABLETS BY MOUTH TWICE A DAY     Last Filled:  07/01/21    Last appt: 5/14/2021   Next appt: Visit date not found    Last OARRS:   RX Monitoring 6/3/2019   Attestation The Prescription Monitoring Report for this patient was reviewed today.    Periodic Controlled Substance Monitoring -

## 2021-07-20 ENCOUNTER — HOSPITAL ENCOUNTER (OUTPATIENT)
Dept: ULTRASOUND IMAGING | Age: 86
Discharge: HOME | End: 2021-07-20
Payer: MEDICARE

## 2021-07-20 ENCOUNTER — TELEPHONE (OUTPATIENT)
Dept: CARDIOLOGY CLINIC | Age: 86
End: 2021-07-20

## 2021-07-20 DIAGNOSIS — N28.1 RENAL CYST: Primary | ICD-10-CM

## 2021-07-20 DIAGNOSIS — N28.1 RENAL CYST: ICD-10-CM

## 2021-07-20 PROCEDURE — 76770 US EXAM ABDO BACK WALL COMP: CPT

## 2021-07-20 NOTE — TELEPHONE ENCOUNTER
[facility] states pt's retroperitoneal complete order is not covered and needs it changed to ultrasound renal complete IMG # 526. Pt has appt today at 430 pm. Any questions please call.

## 2021-07-21 DIAGNOSIS — N28.1 RENAL CYST: Primary | ICD-10-CM

## 2021-08-02 NOTE — TELEPHONE ENCOUNTER
Medication:   Requested Prescriptions     Pending Prescriptions Disp Refills    metoprolol tartrate (LOPRESSOR) 25 MG tablet [Pharmacy Med Name: METOPROLOL TARTRATE 25 MG TAB] 60 tablet 0     Sig: TAKE TWO TABLETS BY MOUTH TWICE A DAY     Last Filled:  7/19/21    Last appt: 5/14/2021   Next appt: Visit date not found    Last Labs DM:   Lab Results   Component Value Date    LABA1C 6.7 05/22/2020     Last Lipid:   Lab Results   Component Value Date    CHOL 167 08/12/2019    TRIG 111 08/12/2019    HDL 64 08/12/2019    HDL 63 05/30/2012    LDLCALC 81 08/12/2019     Last PSA: No results found for: PSA  Last Thyroid:   Lab Results   Component Value Date    TSH 1.83 08/12/2019    T4FREE 1.3 08/18/2014

## 2021-08-03 ENCOUNTER — TELEPHONE (OUTPATIENT)
Dept: PRIMARY CARE CLINIC | Age: 86
End: 2021-08-03

## 2021-08-04 ENCOUNTER — HOSPITAL ENCOUNTER (OUTPATIENT)
Age: 86
Discharge: HOME | End: 2021-08-04
Payer: MEDICARE

## 2021-08-04 ENCOUNTER — HOSPITAL ENCOUNTER (OUTPATIENT)
Dept: CT IMAGING | Age: 86
Discharge: HOME | End: 2021-08-04
Payer: MEDICARE

## 2021-08-04 DIAGNOSIS — N28.1 RENAL CYST: ICD-10-CM

## 2021-08-04 LAB
ALBUMIN SERPL-MCNC: 4.2 G/DL (ref 3.4–5)
ANION GAP SERPL CALCULATED.3IONS-SCNC: 9 MMOL/L (ref 3–16)
BUN BLDV-MCNC: 12 MG/DL (ref 7–20)
CALCIUM SERPL-MCNC: 9.5 MG/DL (ref 8.3–10.6)
CHLORIDE BLD-SCNC: 101 MMOL/L (ref 99–110)
CO2: 28 MMOL/L (ref 21–32)
CREAT SERPL-MCNC: 0.8 MG/DL (ref 0.6–1.2)
GFR AFRICAN AMERICAN: >60
GFR NON-AFRICAN AMERICAN: >60
GLUCOSE BLD-MCNC: 110 MG/DL (ref 70–99)
PHOSPHORUS: 3.9 MG/DL (ref 2.5–4.9)
POTASSIUM SERPL-SCNC: 4.4 MMOL/L (ref 3.5–5.1)
SODIUM BLD-SCNC: 138 MMOL/L (ref 136–145)

## 2021-08-04 PROCEDURE — 36415 COLL VENOUS BLD VENIPUNCTURE: CPT

## 2021-08-04 PROCEDURE — 80069 RENAL FUNCTION PANEL: CPT

## 2021-08-04 PROCEDURE — 6360000004 HC RX CONTRAST MEDICATION: Performed by: INTERNAL MEDICINE

## 2021-08-04 PROCEDURE — 74177 CT ABD & PELVIS W/CONTRAST: CPT

## 2021-08-04 RX ADMIN — IOPAMIDOL 75 ML: 755 INJECTION, SOLUTION INTRAVENOUS at 14:00

## 2021-08-10 ENCOUNTER — TELEPHONE (OUTPATIENT)
Dept: PRIMARY CARE CLINIC | Age: 86
End: 2021-08-10

## 2021-08-10 NOTE — TELEPHONE ENCOUNTER
[phone] (home)   Spoke w/pt reviewed US, gave results   Cardiology reduced Metoprol 25mg to 12.5mg BID     BP has been  173/81   Pulse 97  179/88  151/73   148/74    Pulse 88  172/89  Pulse 94

## 2021-08-10 NOTE — TELEPHONE ENCOUNTER
Patient called and stated that her machine has been off and can see that Chandrika Cox has tried calling her several times, please call when you have a moment.        Thank you

## 2021-08-11 ENCOUNTER — TELEPHONE (OUTPATIENT)
Dept: CARDIOLOGY CLINIC | Age: 86
End: 2021-08-11

## 2021-08-18 ENCOUNTER — OFFICE VISIT (OUTPATIENT)
Dept: CARDIOLOGY CLINIC | Age: 86
End: 2021-08-18
Payer: MEDICARE

## 2021-08-18 VITALS
BODY MASS INDEX: 35.16 KG/M2 | SYSTOLIC BLOOD PRESSURE: 132 MMHG | HEIGHT: 65 IN | HEART RATE: 88 BPM | DIASTOLIC BLOOD PRESSURE: 64 MMHG | OXYGEN SATURATION: 96 % | WEIGHT: 211 LBS

## 2021-08-18 DIAGNOSIS — R53.1 GENERALIZED WEAKNESS: ICD-10-CM

## 2021-08-18 DIAGNOSIS — I10 HYPERTENSION, UNSPECIFIED TYPE: Primary | ICD-10-CM

## 2021-08-18 DIAGNOSIS — E78.5 HYPERLIPIDEMIA, UNSPECIFIED HYPERLIPIDEMIA TYPE: ICD-10-CM
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
## 2021-08-18 PROCEDURE — 99214 OFFICE O/P EST MOD 30 MIN: CPT | Performed by: INTERNAL MEDICINE

## 2021-08-18 ASSESSMENT — ENCOUNTER SYMPTOMS
BACK PAIN: 1
COUGH: 0
BLOOD IN STOOL: 0
SHORTNESS OF BREATH: 0
CHEST TIGHTNESS: 0
NAUSEA: 1
ABDOMINAL PAIN: 0
ABDOMINAL DISTENTION: 0
PHOTOPHOBIA: 0

## 2021-08-18 NOTE — LETTER
[address] [place] [address] Cardiology [facility]  [address] [place], [place] [address]  [address]  Phone: [phone]  Fax: [phone]    Shanna Faye DO    September 19, 2021     Alcides Hernandez [address] [address]    Patient: Radha Walsh   MR Number: [id number]   YOB: 1934   Date of Visit: 8/18/2021       Dear Nando Ivory: Thank you for referring Dina Epstein to me for evaluation/treatment. Below are the relevant portions of my assessment and plan of care. If you have questions, please do not hesitate to call me. I look forward to following Adaline Needles along with you.     Sincerely,      Shanna Faye DO

## 2021-08-18 NOTE — PATIENT INSTRUCTIONS
How to take a blood pressure by the American [employer] Association   - don't smoke drink caffeinated beverages or exercise within 30 minutes before measuring your blood pressure. Empty your bladder and ensure at least 5 minutes of quiet rest before measurement. - sit with you back straight and supported ( on a dining chair, rather than a sofa) your feet should be flat on the floor and your legs should not be crossed. Our arm should be supported on a flat surface (such as a table) with the upper arm at heart level. Make sure the bottom of the cuff is placed directly above the bend of the elbow. - don't take the measurements over clothes.     Date             Blood Pressure          Comment                                                 Date             Blood Pressure          Comment      ----------          -------------------          ---------------------------------                         ----------          -------------------          ---------------------------------        ----------          -------------------          ---------------------------------                         ----------          -------------------          ---------------------------------        ----------          -------------------          ---------------------------------                         ----------          -------------------          ---------------------------------        ----------          -------------------          ---------------------------------                        ----------          -------------------          ---------------------------------        ----------          -------------------          ---------------------------------                        ----------          -------------------          ---------------------------------      Call or send via My Chart above readings on  September 7 2021      Recommend daily exercise, swimming, light weights

## 2021-08-18 NOTE — PROGRESS NOTES
surgery (1993); Colonoscopy (2005); Mohs surgery (2/08); Suture Removal (june 2004); other surgical history (12/22/06); joint replacement; eye surgery; Breast lumpectomy (9/27/05); and Breast biopsy (Right, 5/3/13). SocHx:  reports that she has never smoked. She has never used smokeless tobacco. She reports current alcohol use. She reports that she does not use drugs. FamHx: No family history of premature coronary artery disease, sudden death, or aneurysm  Allergies: Lodine [etodolac]   ROS:   Review of Systems   Constitutional: Positive for fatigue. Negative for activity change, diaphoresis and fever. HENT: Negative for congestion and ear discharge. Eyes: Negative for photophobia and visual disturbance. Respiratory: Negative for cough, chest tightness and shortness of breath. Cardiovascular: Negative for chest pain, palpitations and leg swelling. Gastrointestinal: Positive for nausea. Negative for abdominal distention, abdominal pain and blood in stool. Endocrine: Negative for cold intolerance and polydipsia. Genitourinary: Negative for difficulty urinating and flank pain. Musculoskeletal: Positive for arthralgias, back pain, gait problem and myalgias. Skin: Negative for rash and wound. Allergic/Immunologic: Negative for environmental allergies and immunocompromised state. Neurological: Positive for dizziness, weakness and numbness. Negative for headaches. Hematological: Negative for adenopathy. Does not bruise/bleed easily. Psychiatric/Behavioral: Negative for confusion. The patient is not hyperactive. MEDICATIONS      Prior to Admission medications    Medication Sig Start Date End Date Taking?  Authorizing Provider   pravastatin (PRAVACHOL) 20 MG tablet TAKE 1 TABLET DAILY 5/27/21  Yes Richmond Ponce MD   pantoprazole (PROTONIX) 40 MG tablet TAKE 1 TABLET DAILY 5/27/21  Yes Richmond Ponce MD   ergocalciferol (ERGOCALCIFEROL) 1.25 MG (25031 UT) capsule Take 1 capsule by mouth once a week 5/24/21  Yes Gwendolyn Jhaveri MD   DULoxetine (CYMBALTA) 60 MG extended release capsule TAKE 1 CAPSULE DAILY 5/10/21  Yes Gwendolyn Jhaveri MD   fluticasone [facility]) 50 MCG/ACT nasal spray USE 2 SPRAYS NASALLY DAILY 2/11/21  Yes Gwendolyn Jhaveri MD   benazepril (LOTENSIN) 40 MG tablet TAKE ONE TABLET BY MOUTH EVERY EVENING 12/8/20  Yes Gwendolyn Jhaveri MD   naproxen sodium (ANAPROX) 220 MG tablet Take 220 mg by mouth 2 times daily (with meals)   Yes Historical Provider, MD   vitamin B-6 (PYRIDOXINE) 100 MG tablet Take 100 mg by mouth daily   Yes Historical Provider, MD   vitamin E 400 UNIT capsule Take 400 Units by mouth daily   Yes Historical Provider, MD   modafinil (PROVIGIL) 200 MG tablet Take 200 mg by mouth daily. Yes Historical Provider, MD   Calcium Polycarbophil (FIBER-CAPS PO) Take by mouth   Yes Historical Provider, MD   Multiple Vitamins-Minerals (VISION VITAMINS PO) Take by mouth   Yes Historical Provider, MD   meclizine (ANTIVERT) 25 MG tablet Take 25 mg by mouth 2 times daily. Yes Historical Provider, MD       PHYSICAL EXAM        Vitals:    08/18/21 1441   BP: 132/64   Pulse: 88   SpO2: 96%    Weight: 211 lb (95.7 kg)     Gen Alert, cooperative, no distress, frail, using walker Heart  Regular rate and rhythm, 1/6 systolic murmur   Head Normocephalic, atraumatic, no abnormalities Abd  Soft, NT, +BS, no mass, no OM   Eyes PERRLA, conj/corn clear Ext  Ext nl, AT, no C/C,  edema   Nose Nares normal, no drain age, Non-tender Pulse 2+ and symmetric   Throat Lips, mucosa, tongue normal Skin Color/text/turg nl, no rash/lesions   Neck S/S, TM, NT, no bruit Psych Nl mood and affect   Lung CTA-B, unlabored, no DTP     Ch wall NT, no deform       LABS and Imaging     Relevant and available CV data reviewed  6/10/2021-Normal left ventricle size, wall thickness, and systolic function with an   estimated ejection fraction of 55-60%. -No regional wall motion abnormalities are seen.    -There is mild-to-moderate mitral regurgitation.   -There is mild tricuspid regurgitation with a RVSP estimation of 27 mmHg.   -Normal diastolic function. Avg. E/e'=12.1   -Incidental Finding: Large cystic structure noted within the mid to superior   pole of right kidney measuring 7.41 cmx 8.67 cm. Cath: none  Holter: 2021 holter monitor-- Heart rate   EK2021  Inferior q. Personally interpreted  Stress:2013   moderateRisk  Moderate Complexity/Medical Decision Making  Outside records Reviewed  Labs Reviewed  Prior Imaging, ekg,  echo reviewed when available  Medications reviewed  Old Notes reviewed  ASSESSMENT AND PLAN     1. Near syncope/falls  - no recurrence, stable  - multifactorial from impaired proprioception with neuropathy from spinal stenosis/arthritis/joint mobility with betablocker and deconditioning  - no evidence of arrhythmia on monitor   Plan  - recommend daily exercise and water aerobics to increase mobility     2. Generalized weakness  - new problem  Plan:  - recommend improving strength and PT    3. Essential Hypertension  - 132/64  - at home blood pressure is in 130-140's  - on lotensin 40 mg daily lopressor 25 mg daily  - stable  Plan  - continue to monitor- will call in few weeks with blood pressure  -discussed betablockers aren't great medications for geriatric blood pressure control  -goal blood pressure 150/80 given age and comorbidities. If still elevated can add  amlodipine 2.5 mg daily at night    4. Mixed Hyperlipidemia  - on pravachol 20 mg daily  -stable  Plan  - continue above    5. Valvular heart disease  - mild to moderate mr and tr  - stable  Plan:  - repeat echo in 3 years    Patient counseled on lifestyle modification, diet, and exercise. Follow Up:   One year      Dr. Nicole Cote:  Trace PARSONS, am scribing for and in the presence of Aldair Braxton 21 3:08 PM   Physician Attestation  The scribe for and in the presence of me Adilene Troy DO). The scribe Hailey Crespo may have prepopulated components of this note with my historical  intellectual property under my direct supervision. Any additions to this intellectual property were performed in my presence and at my direction.   Furthermore, the content and accuracy of this note have been reviewed by me Adilene Troy DO).  8/18/2021 3:08 PM

## 2021-08-30 DIAGNOSIS — I10 ESSENTIAL HYPERTENSION: ICD-10-CM

## 2021-08-30 RX ORDER — BENAZEPRIL HYDROCHLORIDE 40 MG/1
40 TABLET, FILM COATED ORAL DAILY
Qty: 90 TABLET | Refills: 0 | Status: SHIPPED | OUTPATIENT
Start: 2021-08-30 | End: 2022-02-02

## 2021-08-30 NOTE — TELEPHONE ENCOUNTER
Medication:   Requested Prescriptions     Pending Prescriptions Disp Refills    benazepril (LOTENSIN) 40 MG tablet 90 tablet 0     Sig: Take 1 tablet by mouth daily       Last Filled:      Patient Phone Number: [phone] (home)     Last appt: 5/14/2021   Next appt: Visit date not found    Last BMP:   Lab Results   Component Value Date     08/04/2021    K 4.4 08/04/2021     08/04/2021    CO2 28 08/04/2021    ANIONGAP 9 08/04/2021    GLUCOSE 110 08/04/2021    GLUCOSE 97 05/03/2012    BUN 12 08/04/2021    CREATININE 0.8 08/04/2021    LABGLOM >60 08/04/2021    LABGLOM 66 05/03/2012    LABGLOM 54 05/03/2012    GFRAA >60 08/04/2021    GFRAA >60 06/11/2013    CALCIUM 9.5 08/04/2021      Last CMP:   Lab Results   Component Value Date     08/04/2021    K 4.4 08/04/2021     08/04/2021    CO2 28 08/04/2021    ANIONGAP 9 08/04/2021    GLUCOSE 110 08/04/2021    GLUCOSE 97 05/03/2012    BUN 12 08/04/2021    CREATININE 0.8 08/04/2021    LABGLOM >60 08/04/2021    LABGLOM 66 05/03/2012    LABGLOM 54 05/03/2012    GFRAA >60 08/04/2021    GFRAA >60 06/11/2013    PROT 6.5 08/12/2019    PROT 6.9 12/04/2012    LABALBU 4.2 08/04/2021    LABALBU 3.8 07/20/2010    AGRATIO 2.1 05/21/2015    BILITOT 0.9 08/12/2019    ALKPHOS 46 08/12/2019    ALT 29 08/12/2019    AST 22 08/12/2019    GLOB 2.1 05/21/2015     Last Renal Function:   Lab Results   Component Value Date     08/04/2021    K 4.4 08/04/2021     08/04/2021    CO2 28 08/04/2021    GLUCOSE 110 08/04/2021    GLUCOSE 97 05/03/2012    BUN 12 08/04/2021    CREATININE 0.8 08/04/2021    PHOS 3.9 08/04/2021    LABALBU 4.2 08/04/2021    LABALBU 3.8 07/20/2010    CALCIUM 9.5 08/04/2021    GFR 74 10/19/2020    GFR 64 10/19/2020    GFRAA >60 08/04/2021    GFRAA >60 06/11/2013       Last OARRS:   RX Monitoring 6/3/2019   Attestation The Prescription Monitoring Report for this patient was reviewed today.    Periodic Controlled Substance Monitoring - Preferred Pharmacy:   [facility] [address], [place] [facility] [phone] Joe Barry [phone]  [address] [place] [address]  Phone: [phone] Fax: [phone] - [address] [phone] - f [phone]  [facility] [address]  Phone: [phone] Fax: [phone]

## 2021-09-02 RX ORDER — PHENAZOPYRIDINE HYDROCHLORIDE 100 MG/1
100 TABLET, FILM COATED ORAL 3 TIMES DAILY PRN
Qty: 6 TABLET | Refills: 0 | Status: SHIPPED | OUTPATIENT
Start: 2021-09-02 | End: 2021-09-04

## 2021-09-03 ENCOUNTER — NURSE ONLY (OUTPATIENT)
Dept: PRIMARY CARE CLINIC | Age: 86
End: 2021-09-03
Payer: MEDICARE

## 2021-09-03 ENCOUNTER — TELEPHONE (OUTPATIENT)
Dept: PRIMARY CARE CLINIC | Age: 86
End: 2021-09-03

## 2021-09-03 DIAGNOSIS — R35.0 FREQUENCY OF URINATION: Primary | ICD-10-CM

## 2021-09-03 LAB
BILIRUBIN, POC: NORMAL
BLOOD URINE, POC: NORMAL
CLARITY, POC: NORMAL
COLOR, POC: NORMAL
GLUCOSE URINE, POC: 100
KETONES, POC: NORMAL
LEUKOCYTE EST, POC: NORMAL
NITRITE, POC: NORMAL
PH, POC: 5.5
PROTEIN, POC: 30
SPECIFIC GRAVITY, POC: 1.01
UROBILINOGEN, POC: 2

## 2021-09-03 PROCEDURE — 81002 URINALYSIS NONAUTO W/O SCOPE: CPT | Performed by: FAMILY MEDICINE

## 2021-09-03 RX ORDER — NITROFURANTOIN 25; 75 MG/1; MG/1
100 CAPSULE ORAL 2 TIMES DAILY
Qty: 20 CAPSULE | Refills: 0 | Status: SHIPPED | OUTPATIENT
Start: 2021-09-03 | End: 2021-09-13

## 2021-09-03 NOTE — TELEPHONE ENCOUNTER
Pt states that she dropped of UA today and she would like results.  She states isra j luis wants to get treatment before weekend

## 2021-09-05 LAB
ORGANISM: ABNORMAL
URINE CULTURE, ROUTINE: ABNORMAL

## 2021-09-20 ENCOUNTER — TELEPHONE (OUTPATIENT)
Dept: CARDIOLOGY CLINIC | Age: 86
End: 2021-09-20

## 2021-09-20 NOTE — TELEPHONE ENCOUNTER
----- Message from Ace Caraballo DO sent at 9/19/2021  9:42 AM EDT -----  Can we check on her bps? Thanks!     9/20/2021 1535  Called patient she has recorded the below blood pressures  -she lost the paper and will call us when she finds it

## 2021-09-21 ENCOUNTER — OFFICE VISIT (OUTPATIENT)
Dept: PRIMARY CARE CLINIC | Age: 86
End: 2021-09-21
Payer: MEDICARE

## 2021-09-21 VITALS
WEIGHT: 203.6 LBS | BODY MASS INDEX: 32.72 KG/M2 | TEMPERATURE: 98.2 F | OXYGEN SATURATION: 96 % | HEIGHT: 66 IN | HEART RATE: 68 BPM | DIASTOLIC BLOOD PRESSURE: 80 MMHG | SYSTOLIC BLOOD PRESSURE: 130 MMHG

## 2021-09-21 DIAGNOSIS — M48.061 SPINAL STENOSIS OF LUMBAR REGION, UNSPECIFIED WHETHER NEUROGENIC CLAUDICATION PRESENT: ICD-10-CM

## 2021-09-21 DIAGNOSIS — Z23 NEED FOR INFLUENZA VACCINATION: ICD-10-CM

## 2021-09-21 DIAGNOSIS — I10 ESSENTIAL HYPERTENSION: Primary | ICD-10-CM

## 2021-09-21 DIAGNOSIS — C50.919 MALIGNANT NEOPLASM OF FEMALE BREAST, UNSPECIFIED ESTROGEN RECEPTOR STATUS, UNSPECIFIED LATERALITY, UNSPECIFIED SITE OF BREAST (HCC): ICD-10-CM

## 2021-09-21 PROCEDURE — 90694 VACC AIIV4 NO PRSRV 0.5ML IM: CPT | Performed by: FAMILY MEDICINE

## 2021-09-21 PROCEDURE — G0008 ADMIN INFLUENZA VIRUS VAC: HCPCS | Performed by: FAMILY MEDICINE

## 2021-09-21 PROCEDURE — 99214 OFFICE O/P EST MOD 30 MIN: CPT | Performed by: FAMILY MEDICINE

## 2021-09-21 RX ORDER — ATENOLOL 25 MG/1
25 TABLET ORAL EVERY EVENING
Qty: 90 TABLET | Refills: 1 | Status: SHIPPED | OUTPATIENT
Start: 2021-09-21 | End: 2021-09-21

## 2021-09-21 RX ORDER — ATENOLOL 25 MG/1
25 TABLET ORAL DAILY
COMMUNITY
End: 2021-09-21 | Stop reason: ALTCHOICE

## 2021-09-21 SDOH — SOCIAL STABILITY: SOCIAL NETWORK: HOW OFTEN DO YOU ATTEND CHURCH OR RELIGIOUS SERVICES?: MORE THAN 4 TIMES PER YEAR

## 2021-09-21 SDOH — ECONOMIC STABILITY: HOUSING INSECURITY
IN THE LAST 12 MONTHS, WAS THERE A TIME WHEN YOU DID NOT HAVE A STEADY PLACE TO SLEEP OR SLEPT IN A SHELTER (INCLUDING NOW)?: NO

## 2021-09-21 SDOH — SOCIAL STABILITY: SOCIAL NETWORK: ARE YOU MARRIED, WIDOWED, DIVORCED, SEPARATED, NEVER MARRIED, OR LIVING WITH A PARTNER?: MARRIED

## 2021-09-21 SDOH — ECONOMIC STABILITY: INCOME INSECURITY: HOW HARD IS IT FOR YOU TO PAY FOR THE VERY BASICS LIKE FOOD, HOUSING, MEDICAL CARE, AND HEATING?: NOT HARD AT ALL

## 2021-09-21 SDOH — SOCIAL STABILITY: SOCIAL NETWORK: HOW OFTEN DO YOU GET TOGETHER WITH FRIENDS OR RELATIVES?: ONCE A WEEK

## 2021-09-21 SDOH — HEALTH STABILITY: PHYSICAL HEALTH: ON AVERAGE, HOW MANY MINUTES DO YOU ENGAGE IN EXERCISE AT THIS LEVEL?: 0 MIN

## 2021-09-21 SDOH — ECONOMIC STABILITY: INCOME INSECURITY: IN THE LAST 12 MONTHS, WAS THERE A TIME WHEN YOU WERE NOT ABLE TO PAY THE MORTGAGE OR RENT ON TIME?: NO

## 2021-09-21 SDOH — ECONOMIC STABILITY: HOUSING INSECURITY: IN THE LAST 12 MONTHS, HOW MANY PLACES HAVE YOU LIVED?: 1

## 2021-09-21 SDOH — ECONOMIC STABILITY: FOOD INSECURITY: WITHIN THE PAST 12 MONTHS, THE FOOD YOU BOUGHT JUST DIDN'T LAST AND YOU DIDN'T HAVE MONEY TO GET MORE.: NEVER TRUE

## 2021-09-21 SDOH — ECONOMIC STABILITY: FOOD INSECURITY: WITHIN THE PAST 12 MONTHS, YOU WORRIED THAT YOUR FOOD WOULD RUN OUT BEFORE YOU GOT MONEY TO BUY MORE.: NEVER TRUE

## 2021-09-21 SDOH — SOCIAL STABILITY: SOCIAL NETWORK
DO YOU BELONG TO ANY CLUBS OR ORGANIZATIONS SUCH AS CHURCH GROUPS UNIONS, FRATERNAL OR ATHLETIC GROUPS, OR SCHOOL GROUPS?: NO

## 2021-09-21 SDOH — SOCIAL STABILITY: SOCIAL NETWORK: HOW OFTEN DO YOU ATTENT MEETINGS OF THE CLUB OR ORGANIZATION YOU BELONG TO?: NEVER

## 2021-09-21 SDOH — SOCIAL STABILITY: SOCIAL NETWORK: IN A TYPICAL WEEK, HOW MANY TIMES DO YOU TALK ON THE PHONE WITH FAMILY, FRIENDS, OR NEIGHBORS?: THREE TIMES A WEEK

## 2021-09-21 SDOH — HEALTH STABILITY: MENTAL HEALTH: HOW OFTEN DO YOU HAVE A DRINK CONTAINING ALCOHOL?: NEVER

## 2021-09-21 SDOH — HEALTH STABILITY: MENTAL HEALTH
STRESS IS WHEN SOMEONE FEELS TENSE, NERVOUS, ANXIOUS, OR CAN'T SLEEP AT NIGHT BECAUSE THEIR MIND IS TROUBLED. HOW STRESSED ARE YOU?: NOT AT ALL

## 2021-09-21 SDOH — HEALTH STABILITY: PHYSICAL HEALTH: ON AVERAGE, HOW MANY DAYS PER WEEK DO YOU ENGAGE IN MODERATE TO STRENUOUS EXERCISE (LIKE A BRISK WALK)?: 0 DAYS

## 2021-09-21 ASSESSMENT — ENCOUNTER SYMPTOMS
GASTROINTESTINAL NEGATIVE: 1
EYES NEGATIVE: 1
RESPIRATORY NEGATIVE: 1

## 2021-09-21 ASSESSMENT — SOCIAL DETERMINANTS OF HEALTH (SDOH): HOW HARD IS IT FOR YOU TO PAY FOR THE VERY BASICS LIKE FOOD, HOUSING, MEDICAL CARE, AND HEATING?: NOT HARD AT ALL

## 2021-09-21 NOTE — PROGRESS NOTES
SUBJECTIVE:  Patient ID: Va Braswell is a 80 y.o. y.o. female       Hypertension: Patient here for follow-up of elevated blood pressure. She is not exercising and is adherent to low salt diet.  Blood pressure is well controlled at home. Cardiac symptoms none. Patient denies chest pain, chest pressure/discomfort, claudication, dyspnea, exertional chest pressure/discomfort, fatigue, irregular heart beat, lower extremity edema, near-syncope, orthopnea, palpitations, paroxysmal nocturnal dyspnea and syncope.  Cardiovascular risk factors: advanced age (older than 54 for men, 72 for women), dyslipidemia, hypertension and obesity (BMI >= 30 kg/m2). Use of agents associated with hypertension: none. History of target organ damage: none.   Patient has been having some abnormal reading at home according to daughter has been there a little bit confused over there medication  According to the  she is taking half a tablet twice a day she is taking 2 of them in the morning 2 at night, none her medication with eyes show this description  She is on Lopressor half a tablet twice a day the pill is very small is hard for him to cut so has been giving her 1 pill a day  She was seen by her cardiologist reviewed his note he says he will start Norvasc 2.5 the medication was not sent to the pharmacy patient blood pressure is okay today she had some side effect with amlodipine in the past so we will hold on that medication unless we have to,  With back pain unsteady feet she has been continuing with the physical therapy she is using a cane she use a walker at home  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 replacement therapy     Rectocele 1970s    Seasonal allergies     Sleep apnea 2010    Spinal stenosis of lumbar region     Gets epidural injections every 6 months      Past Surgical History:   Procedure Laterality Date    BREAST BIOPSY Right 5/3/13    biopsy    BREAST LUMPECTOMY  9/27/05    right lumpectomy   [address] REMOVAL  1/08    bilateral    COLONOSCOPY  2005    30, R7X 7X 2005    CYSTOCELE REPAIR  1989    DILATION AND CURETTAGE OF UTERUS  yrs ago    DOPPLER ECHOCARDIOGRAPHY  2007    EYE SURGERY      bilat catarats    HYSTERECTOMY  4/1974    JOINT REPLACEMENT      left hip    KNEE SURGERY  1991    L   [address]    MOHS SURGERY  2/08    nose    OTHER SURGICAL HISTORY  12/22/06    epidural    RECTOCELE REPAIR  1989    SUTURE REMOVAL  june 2004    sutures in 3 fingers    TONSILLECTOMY      TOTAL HIP ARTHROPLASTY  2006    L     Family History   Problem Relation Age of Onset    Cancer Brother         Prostate    Cancer Mother         Stomach Cancer at age 80    Stroke Father     Cancer Paternal Grandmother         intraadbominal at age 80    Cancer Other         Leukemia age 70     Social History     Socioeconomic History    Marital status:      Spouse name: Shelba Sever Number of children: 2    Years of education: None    Highest education level: None   Occupational History     Employer: RETIRED   Tobacco Use    Smoking status: Never Smoker    Smokeless tobacco: Never Used   Vaping Use    Vaping Use: Never used   Substance and Sexual Activity    Alcohol use: Yes     Comment: very rare occasions    Drug use: No    Sexual activity: Yes     Partners: Male   Other Topics Concern    None   Social History Narrative    None     Social Determinants of Health     Financial Resource Strain: Low Risk     Difficulty of Paying Living Expenses: Not hard at all   Food Insecurity: No Food Insecurity    Worried About Running Out of Food in the Last Year: Never true    Ran Out of Food in the Last Year: Never true   Transportation Needs: No Transportation Needs    Lack of Transportation (Medical): No    Lack of Transportation (Non-Medical): No   Physical Activity: Inactive    Days of Exercise per Week: 0 days    Minutes of Exercise per Session: 0 min   Stress: No Stress Concern Present    Feeling of Stress : Not at all   Social Connections: Moderately Integrated    Frequency of Communication with Friends and Family: Three times a week    Frequency of Social Gatherings with Friends and Family: Once a week    Attends Zoroastrian Services: More than 4 times per year    Active Member of [employer] Group or Organizations: No    Attends Club or Organization Meetings: Never    Marital Status:    Intimate Partner Violence:     Fear of Current or Ex-Partner:     Emotionally Abused:     Physically Abused:     Sexually Abused:      Current Outpatient Medications   Medication Sig Dispense Refill    benazepril (LOTENSIN) 40 MG tablet Take 1 tablet by mouth daily 90 tablet 0    pravastatin (PRAVACHOL) 20 MG tablet TAKE 1 TABLET DAILY 90 tablet 3    pantoprazole (PROTONIX) 40 MG tablet TAKE 1 TABLET DAILY 90 tablet 3    ergocalciferol (ERGOCALCIFEROL) 1.25 MG (71202 UT) capsule Take 1 capsule by mouth once a week 4 capsule 5    DULoxetine (CYMBALTA) 60 MG extended release capsule TAKE 1 CAPSULE DAILY 90 capsule 1    fluticasone (FLONASE) 50 MCG/ACT nasal spray USE 2 SPRAYS NASALLY DAILY 48 g 3    naproxen sodium (ANAPROX) 220 MG tablet Take 220 mg by mouth 2 times daily (with meals)      vitamin B-6 (PYRIDOXINE) 100 MG tablet Take 100 mg by mouth daily      vitamin E 400 UNIT capsule Take 400 Units by mouth daily      modafinil (PROVIGIL) 200 MG tablet Take 200 mg by mouth daily.       Calcium Polycarbophil laterality, unspecified site of breast ([facility] [address]     4. Spinal stenosis of lumbar region, unspecified whether neurogenic claudication present         PLAN:  See orders  To take Lopressor in the evening  Benzapril 40 in the morning  Monitor blood pressure at home  Close follow-up to bring all her medication with her and her cuff  Explained to the patient and her  is okay to have slight high reading occasionally.

## 2021-09-21 NOTE — PROGRESS NOTES
Vaccine Information Sheet, \"Influenza - Inactivated\"  given to Linh Friend, or parent/legal guardian of  Linh Friend and verbalized understanding. Patient responses:    Have you ever had a reaction to a flu vaccine? No  Are you able to eat eggs without adverse effects? Yes  Do you have any current illness? No  Have you ever had Guillian [place] Syndrome? No    Immunizations Administered     Name Date Dose Route    Influenza, Quadv, adjuvanted, 65 yrs +, IM, PF (Fluad) 9/21/2021 0.5 mL Intramuscular    Site: Deltoid- Left    Lot: [id number]    NDC: 88541-022-37          Flu vaccine given per order. Please see immunization tab. Patient instructed to remain in clinic for 20 minutes after injection and was advised to report any adverse reaction to me immediately.

## 2021-09-24 ENCOUNTER — TELEPHONE (OUTPATIENT)
Dept: PRIMARY CARE CLINIC | Age: 86
End: 2021-09-24

## 2021-09-24 NOTE — TELEPHONE ENCOUNTER
returned call, advised not to give Atenolol, was sent in error, needs to be on Metoprolol 25mg 1 tablet in the evening.

## 2021-09-27 ENCOUNTER — TELEPHONE (OUTPATIENT)
Dept: CARDIOLOGY CLINIC | Age: 86
End: 2021-09-27

## 2021-09-27 NOTE — TELEPHONE ENCOUNTER
Pt calling back with the BP's reading that she has.      9/12//21 177/99  9/13/21  156/79  67  186/109   9/14/21  200/102  95  9/15/21 182/87  97  9/16/21 182/87  97    9/21/21 166/84  70

## 2021-09-27 NOTE — TELEPHONE ENCOUNTER
I have called her twice, the last being 9/27,  she will call us back with her blood pressures.  She has lost them

## 2021-09-28 NOTE — TELEPHONE ENCOUNTER
Marko,  Patient seen by you on 8/18. Was asked to monitor her blood pressures at home and call us. If elevated you were going to start norvasc 2.5mg     Do you want her to start norvasce 2.5 daily?

## 2021-10-19 ENCOUNTER — OFFICE VISIT (OUTPATIENT)
Dept: PRIMARY CARE CLINIC | Age: 86
End: 2021-10-19
Payer: MEDICARE

## 2021-10-19 VITALS
BODY MASS INDEX: 34.75 KG/M2 | HEIGHT: 65 IN | DIASTOLIC BLOOD PRESSURE: 80 MMHG | SYSTOLIC BLOOD PRESSURE: 130 MMHG | WEIGHT: 208.6 LBS | OXYGEN SATURATION: 97 % | HEART RATE: 67 BPM

## 2021-10-19 DIAGNOSIS — K21.9 GASTROESOPHAGEAL REFLUX DISEASE, UNSPECIFIED WHETHER ESOPHAGITIS PRESENT: ICD-10-CM

## 2021-10-19 DIAGNOSIS — I10 ESSENTIAL HYPERTENSION: Primary | Chronic | ICD-10-CM

## 2021-10-19 DIAGNOSIS — R30.0 DYSURIA: ICD-10-CM

## 2021-10-19 DIAGNOSIS — R53.83 FATIGUE, UNSPECIFIED TYPE: ICD-10-CM

## 2021-10-19 DIAGNOSIS — E78.2 MIXED HYPERLIPIDEMIA: Chronic | ICD-10-CM

## 2021-10-19 DIAGNOSIS — I10 ESSENTIAL HYPERTENSION: Chronic | ICD-10-CM

## 2021-10-19 DIAGNOSIS — G25.2 COARSE TREMOR: ICD-10-CM

## 2021-10-19 LAB
ALBUMIN SERPL-MCNC: 4.1 G/DL (ref 3.4–5)
ALP BLD-CCNC: 48 U/L (ref 40–129)
ALT SERPL-CCNC: 26 U/L (ref 10–40)
ANION GAP SERPL CALCULATED.3IONS-SCNC: 14 MMOL/L (ref 3–16)
AST SERPL-CCNC: 21 U/L (ref 15–37)
BILIRUB SERPL-MCNC: 0.5 MG/DL (ref 0–1)
BILIRUBIN DIRECT: <0.2 MG/DL (ref 0–0.3)
BILIRUBIN, INDIRECT: ABNORMAL MG/DL (ref 0–1)
BILIRUBIN, POC: ABNORMAL
BLOOD URINE, POC: ABNORMAL
BUN BLDV-MCNC: 13 MG/DL (ref 7–20)
CALCIUM SERPL-MCNC: 9.4 MG/DL (ref 8.3–10.6)
CHLORIDE BLD-SCNC: 100 MMOL/L (ref 99–110)
CLARITY, POC: CLEAR
CO2: 26 MMOL/L (ref 21–32)
COLOR, POC: YELLOW
CREAT SERPL-MCNC: 0.9 MG/DL (ref 0.6–1.2)
FOLATE: 7.37 NG/ML (ref 4.78–24.2)
GFR AFRICAN AMERICAN: >60
GFR NON-AFRICAN AMERICAN: 59
GLUCOSE BLD-MCNC: 107 MG/DL (ref 70–99)
GLUCOSE URINE, POC: ABNORMAL
HCT VFR BLD CALC: 42.4 % (ref 36–48)
HEMOGLOBIN: 13.7 G/DL (ref 12–16)
KETONES, POC: ABNORMAL
LEUKOCYTE EST, POC: ABNORMAL
MCH RBC QN AUTO: 31.9 PG (ref 26–34)
MCHC RBC AUTO-ENTMCNC: 32.4 G/DL (ref 31–36)
MCV RBC AUTO: 98.3 FL (ref 80–100)
NITRITE, POC: ABNORMAL
PDW BLD-RTO: 14.2 % (ref 12.4–15.4)
PH, POC: 7
PLATELET # BLD: 229 K/UL (ref 135–450)
PMV BLD AUTO: 8.9 FL (ref 5–10.5)
POTASSIUM SERPL-SCNC: 4.8 MMOL/L (ref 3.5–5.1)
PROTEIN, POC: ABNORMAL
RBC # BLD: 4.31 M/UL (ref 4–5.2)
SODIUM BLD-SCNC: 140 MMOL/L (ref 136–145)
SPECIFIC GRAVITY, POC: 1.02
TOTAL PROTEIN: 6.1 G/DL (ref 6.4–8.2)
TSH SERPL DL<=0.05 MIU/L-ACNC: 1.9 UIU/ML (ref 0.27–4.2)
UROBILINOGEN, POC: 1
VITAMIN B-12: 287 PG/ML (ref 211–911)
WBC # BLD: 7.3 K/UL (ref 4–11)

## 2021-10-19 PROCEDURE — 99214 OFFICE O/P EST MOD 30 MIN: CPT | Performed by: FAMILY MEDICINE

## 2021-10-19 PROCEDURE — 81002 URINALYSIS NONAUTO W/O SCOPE: CPT | Performed by: FAMILY MEDICINE

## 2021-10-19 ASSESSMENT — ENCOUNTER SYMPTOMS
EYES NEGATIVE: 1
GASTROINTESTINAL NEGATIVE: 1
RESPIRATORY NEGATIVE: 1

## 2021-10-19 NOTE — PROGRESS NOTES
SUBJECTIVE:  Patient ID: Ave Tovar is a 80 y.o. y.o. female       Hypertension: Patient here for follow-up of elevated blood pressure. She is not exercising and is adherent to low salt diet.  Blood pressure is well controlled at home. Cardiac symptoms none. Patient denies chest pain, chest pressure/discomfort, claudication, dyspnea, exertional chest pressure/discomfort, fatigue, irregular heart beat, lower extremity edema, near-syncope, orthopnea, palpitations, paroxysmal nocturnal dyspnea and syncope.  Cardiovascular risk factors: advanced age (older than 54 for men, 72 for women), dyslipidemia, hypertension and obesity (BMI >= 30 kg/m2). Use of agents associated with hypertension: none. History of target organ damage: none.   She brought her cuff with her which was not comparable to our readings advised the patient and her  to buy a new one  Patient with GERD stable on medication  She has been feeling tired lately she had history of sleep apnea she has not been using her CPAP and she has not seen her sleep specialist for a while  2 recommend following up with her sleep specialist to make an appointment today   is concerned about her tremors been getting worse she does not recall any family history of that according to her so referral to see neurology is given  With back pain unsteady feet she has been continuing with the physical therapy she is using a cane she use a walker at home  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 replacement therapy  Rectocele 1970s    Seasonal allergies     Sleep apnea 2010    Spinal stenosis of lumbar region     Gets epidural injections every 6 months      Past Surgical History:   Procedure Laterality Date    BREAST BIOPSY Right 5/3/13    biopsy    BREAST LUMPECTOMY  9/27/05    right lumpectomy   [address] [facility] CATARACT REMOVAL  1/08    bilateral    COLONOSCOPY  2005    30, R7X 7X 2005    CYSTOCELE REPAIR  1989    DILATION AND CURETTAGE OF UTERUS  yrs ago    DOPPLER ECHOCARDIOGRAPHY  2007    EYE SURGERY      bilat catarats    HYSTERECTOMY  4/1974    JOINT REPLACEMENT      left hip    KNEE SURGERY  1991    L    KNEE SURGERY  1993    R    MOHS SURGERY  2/08    nose    OTHER SURGICAL HISTORY  12/22/06    epidural    RECTOCELE REPAIR  1989    SUTURE REMOVAL  june 2004    sutures in 3 fingers    TONSILLECTOMY      TOTAL HIP ARTHROPLASTY  2006    L     Family History   Problem Relation Age of Onset    Cancer Brother         Prostate    Cancer Mother         Stomach Cancer at age 80    Stroke Father     Cancer Paternal Grandmother         intraadbominal at age 80    Cancer Other         Leukemia age 70     Social History     Socioeconomic History    Marital status:      Spouse name: Wilver Patel Number of children: 2    Years of education: Not on file    Highest education level: Not on file   Occupational History     Employer: RETIRED   Tobacco Use    Smoking status: Never Smoker    Smokeless tobacco: Never Used   Vaping Use    Vaping Use: Never used   Substance and Sexual Activity    Alcohol use: Yes     Comment: very rare occasions    Drug use: No    Sexual activity: Yes     Partners: Male   Other Topics Concern    Not on file   Social History Narrative    Not on file     Social Determinants of Health     Financial Resource Strain: Low Risk     Difficulty of Paying Living Expenses: Not hard at all   Food Insecurity: No Food Insecurity    Worried About Running Out of Food in the Last Year: Never true    0 [facility] in the Last Year: Never true   Transportation Needs: No Transportation Needs    Lack of Transportation (Medical): No    Lack of Transportation (Non-Medical): No   Physical Activity: Inactive    Days of Exercise per Week: 0 days    Minutes of Exercise per Session: 0 min   Stress: No Stress Concern Present    Feeling of Stress : Not at all   Social Connections: Moderately Integrated    Frequency of Communication with Friends and Family: Three times a week    Frequency of Social Gatherings with Friends and Family: Once a week    Attends Religion Services: More than 4 times per year    Active Member of [address] [place] [address] [employer] or Organizations: No    Attends Club or Organization Meetings: Never    Marital Status:    Intimate Partner Violence:     Fear of Current or Ex-Partner:     Emotionally Abused:     Physically Abused:     Sexually Abused:      Current Outpatient Medications   Medication Sig Dispense Refill    metoprolol tartrate (LOPRESSOR) 25 MG tablet Take 1 tablet by mouth every evening 30 tablet 2    benazepril (LOTENSIN) 40 MG tablet Take 1 tablet by mouth daily (Patient taking differently: Take 40 mg by mouth daily In the morning) 90 tablet 0    pravastatin (PRAVACHOL) 20 MG tablet TAKE 1 TABLET DAILY 90 tablet 3    pantoprazole (PROTONIX) 40 MG tablet TAKE 1 TABLET DAILY 90 tablet 3    ergocalciferol (ERGOCALCIFEROL) 1.25 MG (80610 UT) capsule Take 1 capsule by mouth once a week 4 capsule 5    DULoxetine (CYMBALTA) 60 MG extended release capsule TAKE 1 CAPSULE DAILY 90 capsule 1    fluticasone (FLONASE) 50 MCG/ACT nasal spray USE 2 SPRAYS NASALLY DAILY 48 g 3    vitamin E 400 UNIT capsule Take 400 Units by mouth daily      modafinil (PROVIGIL) 200 MG tablet Take 200 mg by mouth as needed.        Calcium Polycarbophil (FIBER-CAPS PO) Take by mouth      Multiple Vitamins-Minerals (VISION VITAMINS PO) Take by mouth      meclizine (ANTIVERT) 25 MG tablet Take 25 mg by mouth 2 times daily.  naproxen sodium (ANAPROX) 220 MG tablet Take 220 mg by mouth 2 times daily (with meals) (Patient not taking: Reported on 10/19/2021)      vitamin B-6 (PYRIDOXINE) 100 MG tablet Take 100 mg by mouth daily       No current facility-administered medications for this visit. Allergies   Allergen Reactions    Lodine [Etodolac] Rash       Review of Systems   Constitutional: Negative. HENT: Negative. Eyes: Negative. Respiratory: Negative. Cardiovascular: Negative. Gastrointestinal: Negative. Neurological: Negative for dizziness, facial asymmetry and headaches. All other systems reviewed and are negative. OBJECTIVE:  /80 (Site: Left Upper Arm, Position: Sitting, Cuff Size: Small Adult) Comment: pt cuff 174/82  Pulse 67   Ht 5' 5\" (1.651 m)   Wt 208 lb 9.6 oz (94.6 kg)   SpO2 97%   BMI 34.71 kg/m²     Physical Exam  Vitals reviewed. Eyes:      General: No scleral icterus. Conjunctiva/sclera: Conjunctivae normal.   Neck:      Thyroid: No thyromegaly. Vascular: No JVD. Cardiovascular:      Rate and Rhythm: Normal rate and regular rhythm. Heart sounds: Normal heart sounds. No murmur heard. No friction rub. No gallop. Pulmonary:      Effort: Pulmonary effort is normal.      Breath sounds: Normal breath sounds. No wheezing or rales. Abdominal:      General: Bowel sounds are normal. There is no distension. Palpations: Abdomen is soft. There is no mass. Tenderness: There is no abdominal tenderness. Hernia: No hernia is present. Lymphadenopathy:      Cervical: No cervical adenopathy. Skin:     Findings: No rash. Neurological:      Mental Status: She is alert and oriented to person, place, and time. ASSESSMENT:   Diagnosis Orders   1. Essential hypertension  Basic Metabolic Panel    CBC    Hepatic Function Panel    TSH without Reflex    Vitamin B12 & Folate   2. Mixed hyperlipidemia     3. Gastroesophageal reflux disease, unspecified whether esophagitis present     4. Coarse tremor  [facility] - Simeon Cardozo MD, Neurology, [facility]   5. Fatigue, unspecified type  Basic Metabolic Panel    CBC    Hepatic Function Panel    TSH without Reflex    Vitamin B12 & Folate   6.  Dysuria  POCT Urinalysis no Micro    Culture, Urine         PLAN:  See orders  Continue same medication  Printed a list for the patient and her  to follow at home

## 2021-10-20 LAB
ORGANISM: ABNORMAL
URINE CULTURE, ROUTINE: ABNORMAL

## 2021-10-28 DIAGNOSIS — R30.0 DYSURIA: Primary | ICD-10-CM

## 2021-10-28 RX ORDER — SULFAMETHOXAZOLE AND TRIMETHOPRIM 800; 160 MG/1; MG/1
1 TABLET ORAL 2 TIMES DAILY
Qty: 10 TABLET | Refills: 0 | Status: SHIPPED | OUTPATIENT
Start: 2021-10-28 | End: 2021-11-02

## 2021-11-01 ENCOUNTER — OFFICE VISIT (OUTPATIENT)
Dept: PRIMARY CARE CLINIC | Age: 86
End: 2021-11-01
Payer: MEDICARE

## 2021-11-01 VITALS
TEMPERATURE: 97.7 F | HEART RATE: 83 BPM | HEIGHT: 65 IN | OXYGEN SATURATION: 97 % | SYSTOLIC BLOOD PRESSURE: 128 MMHG | BODY MASS INDEX: 33.7 KG/M2 | WEIGHT: 202.3 LBS | DIASTOLIC BLOOD PRESSURE: 78 MMHG

## 2021-11-01 DIAGNOSIS — Z48.02 REMOVAL OF STAPLE: ICD-10-CM

## 2021-11-01 DIAGNOSIS — M48.061 SPINAL STENOSIS OF LUMBAR REGION, UNSPECIFIED WHETHER NEUROGENIC CLAUDICATION PRESENT: ICD-10-CM
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
## 2021-11-01 DIAGNOSIS — I10 ESSENTIAL HYPERTENSION: ICD-10-CM

## 2021-11-01 DIAGNOSIS — R25.1 TREMOR: ICD-10-CM

## 2021-11-01 PROCEDURE — 99214 OFFICE O/P EST MOD 30 MIN: CPT | Performed by: FAMILY MEDICINE

## 2021-11-01 ASSESSMENT — ENCOUNTER SYMPTOMS
BACK PAIN: 1
RESPIRATORY NEGATIVE: 1
GASTROINTESTINAL NEGATIVE: 1
EYES NEGATIVE: 1

## 2021-11-01 NOTE — PROGRESS NOTES
SUBJECTIVE:  Patient ID: Aniyah Hobbs is a 80 y.o. y.o. female     HPI   Pt is here for follow up on a fall have been on October 22 up in the emergency room according to the  and wife each one of them had different story had it did happen was getting tried to get out of the car she was waiting for him to get her walker could not remember if it was a walker or the cane, and she fell backwards lost her balance does not know exactly how the story was given to the emergency room she was coming out of [employer] where she lost her balance couple of steps and fell backward, sometimes she use the walker occasionally she use the cane  She had a big hematoma on top of her head 3 staples with placed she feels fine now  She denies any chest pain or shortness of breath no lightheadedness or any other symptoms right before happened  Patient with chronic back pain had issue with walking she has not had recent physical therapy  The  is concerned about her tremors are getting worse they already have an appointment to see neurology  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 replacement therapy     Rectocele 1970s    Seasonal allergies     Sleep apnea 2010    Spinal stenosis of lumbar region     Gets epidural injections every 6 months      Past Surgical History:   Procedure Laterality Date    BREAST BIOPSY Right 5/3/13    biopsy    BREAST LUMPECTOMY  9/27/05    right lumpectomy   [address] [facility], [address] CATARACT REMOVAL  1/08    bilateral    COLONOSCOPY  2005    30, R7X 7X 2005    CYSTOCELE REPAIR  1989    DILATION AND CURETTAGE OF UTERUS  yrs ago    DOPPLER ECHOCARDIOGRAPHY  2007    EYE SURGERY      bilat catarats    HYSTERECTOMY  4/1974    JOINT REPLACEMENT      left hip    KNEE SURGERY  1991    L    KNEE SURGERY  1993    R    MOHS SURGERY  2/08    nose    OTHER SURGICAL HISTORY  12/22/06    epidural    RECTOCELE REPAIR  1989    SUTURE REMOVAL  june 2004    sutures in 3 fingers    TONSILLECTOMY      TOTAL HIP ARTHROPLASTY  2006    L     Family History   Problem Relation Age of Onset    Cancer Brother         Prostate    Cancer Mother         Stomach Cancer at age 80    Stroke Father     Cancer Paternal Grandmother         intraadbominal at age 80    Cancer Other         Leukemia age 70     Social History     Socioeconomic History    Marital status:      Spouse name: Ardis Bernheim Number of children: 2    Years of education: None    Highest education level: None   Occupational History     Employer: RETIRED   Tobacco Use    Smoking status: Never Smoker    Smokeless tobacco: Never Used   Vaping Use    Vaping Use: Never used   Substance and Sexual Activity    Alcohol use: Yes     Comment: very rare occasions    Drug use: No    Sexual activity: Yes     Partners: Male   Other Topics Concern    None   Social History Narrative    None     Social Determinants of Health     Financial Resource Strain: Low Risk     Difficulty of Paying Living Expenses: Not hard at all   Food Insecurity: No Food Insecurity    Worried About Running Out of Food in the Last Year: Never true    Leydi of Food in the Last Year: Never true   Transportation Needs: No Transportation Needs    Lack of Transportation (Medical): No    Lack of Transportation (Non-Medical):  No   Physical Activity: Inactive    Days of Exercise per Week: 0 days    Minutes of Exercise per Session: 0 min   Stress: No Stress Concern Present    Feeling of Stress : Not at all Social Connections: Moderately Integrated    Frequency of Communication with Friends and Family: Three times a week    Frequency of Social Gatherings with Friends and Family: Once a week    Attends Yarsani Services: More than 4 times per year    Active Member of [address] [place] [address] or Organizations: No    Attends Club or Organization Meetings: Never    Marital Status:    Intimate Partner Violence:     Fear of Current or Ex-Partner:     Emotionally Abused:     Physically Abused:     Sexually Abused:      Current Outpatient Medications   Medication Sig Dispense Refill    sulfamethoxazole-trimethoprim (BACTRIM DS;SEPTRA DS) 800-160 MG per tablet Take 1 tablet by mouth 2 times daily for 5 days 10 tablet 0    metoprolol tartrate (LOPRESSOR) 25 MG tablet Take 1 tablet by mouth every evening 30 tablet 2    pravastatin (PRAVACHOL) 20 MG tablet TAKE 1 TABLET DAILY 90 tablet 3    pantoprazole (PROTONIX) 40 MG tablet TAKE 1 TABLET DAILY 90 tablet 3    ergocalciferol (ERGOCALCIFEROL) 1.25 MG (40395 UT) capsule Take 1 capsule by mouth once a week 4 capsule 5    DULoxetine (CYMBALTA) 60 MG extended release capsule TAKE 1 CAPSULE DAILY 90 capsule 1    fluticasone (FLONASE) 50 MCG/ACT nasal spray USE 2 SPRAYS NASALLY DAILY 48 g 3    vitamin B-6 (PYRIDOXINE) 100 MG tablet Take 100 mg by mouth daily      vitamin E 400 UNIT capsule Take 400 Units by mouth daily      modafinil (PROVIGIL) 200 MG tablet Take 200 mg by mouth as needed.  Calcium Polycarbophil (FIBER-CAPS PO) Take by mouth      Multiple Vitamins-Minerals (VISION VITAMINS PO) Take by mouth      benazepril (LOTENSIN) 40 MG tablet Take 1 tablet by mouth daily (Patient taking differently: Take 40 mg by mouth daily In the morning) 90 tablet 0     No current facility-administered medications for this visit. Allergies   Allergen Reactions    Lodine [Etodolac] Rash       Review of Systems   Constitutional: Negative. HENT: Negative.     Eyes: Negative. Respiratory: Negative. Cardiovascular: Negative. Gastrointestinal: Negative. Musculoskeletal: Positive for arthralgias, back pain and gait problem. OBJECTIVE:  /78 (Site: Left Upper Arm, Position: Sitting, Cuff Size: Small Adult)   Pulse 83   Temp 97.7 °F (36.5 °C) (Oral)   Ht 5' 5\" (1.651 m)   Wt 202 lb 4.8 oz (91.8 kg)   SpO2 97%   BMI 33.66 kg/m²     Physical Exam  Vitals reviewed. Constitutional:       Appearance: Normal appearance. Eyes:      General: No scleral icterus. Conjunctiva/sclera: Conjunctivae normal.   Neck:      Thyroid: No thyromegaly. Vascular: No JVD. Cardiovascular:      Rate and Rhythm: Normal rate and regular rhythm. Heart sounds: Normal heart sounds. No murmur heard. No friction rub. No gallop. Pulmonary:      Effort: Pulmonary effort is normal.      Breath sounds: Normal breath sounds. No wheezing or rales. Abdominal:      General: Bowel sounds are normal. There is no distension. Palpations: Abdomen is soft. There is no mass. Tenderness: There is no abdominal tenderness. Hernia: No hernia is present. Lymphadenopathy:      Cervical: No cervical adenopathy. Skin:     Findings: No rash. Comments: I  removed 3 stable is off of her scalp, there is big hematoma   Neurological:      Mental Status: She is alert and oriented to person, place, and time. ASSESSMENT:   Diagnosis Orders   1. Fall, sequela  External Referral To Physical Therapy   2. Essential hypertension     3. Spinal stenosis of lumbar region, unspecified whether neurogenic claudication present     4. Tremor     5.  Removal of staple           PLAN:    See orders  Discussed safety issues with her and her   Recommend PT  Follow-up with neurology  Reviewed ER records imaging  Good readings blood pressure continue the same

## 2021-11-10 ENCOUNTER — TELEPHONE (OUTPATIENT)
Dept: PULMONOLOGY | Age: 86
End: 2021-11-10

## 2021-11-10 NOTE — TELEPHONE ENCOUNTER
Patient requesting a refill on her Provigil sent to [facility] on [address]. Patient would like to know when order has been sent.  [phone]

## 2021-11-11 NOTE — TELEPHONE ENCOUNTER
Due to [facility] rules, since it been over a year since she has been seen cannot refill a scheduled medication. Will have to wait till have follow for us to refill it.     Satya Fierro MD

## 2021-11-11 NOTE — TELEPHONE ENCOUNTER
Called patient and told her she would not be able to get a refill until she has an appointment. Put patient on waiting list for a cancellation.

## 2021-11-15 ENCOUNTER — OFFICE VISIT (OUTPATIENT)
Dept: PULMONOLOGY | Age: 86
End: 2021-11-15
Payer: MEDICARE

## 2021-11-15 VITALS
BODY MASS INDEX: 34.99 KG/M2 | SYSTOLIC BLOOD PRESSURE: 140 MMHG | WEIGHT: 210 LBS | DIASTOLIC BLOOD PRESSURE: 80 MMHG | OXYGEN SATURATION: 95 % | HEIGHT: 65 IN | HEART RATE: 105 BPM

## 2021-11-15 DIAGNOSIS — G47.33 OBSTRUCTIVE SLEEP APNEA: Chronic | ICD-10-CM

## 2021-11-15 PROCEDURE — 99214 OFFICE O/P EST MOD 30 MIN: CPT | Performed by: INTERNAL MEDICINE

## 2021-11-15 ASSESSMENT — SLEEP AND FATIGUE QUESTIONNAIRES
HOW LIKELY ARE YOU TO NOD OFF OR FALL ASLEEP IN A CAR, WHILE STOPPED FOR A FEW MINUTES IN TRAFFIC: 0
HOW LIKELY ARE YOU TO NOD OFF OR FALL ASLEEP WHILE SITTING QUIETLY AFTER LUNCH WITHOUT ALCOHOL: 1
ESS TOTAL SCORE: 2
HOW LIKELY ARE YOU TO NOD OFF OR FALL ASLEEP WHILE LYING DOWN TO REST IN THE AFTERNOON WHEN CIRCUMSTANCES PERMIT: 0
HOW LIKELY ARE YOU TO NOD OFF OR FALL ASLEEP WHILE SITTING INACTIVE IN A PUBLIC PLACE: 0
HOW LIKELY ARE YOU TO NOD OFF OR FALL ASLEEP WHILE SITTING AND TALKING TO SOMEONE: 0
HOW LIKELY ARE YOU TO NOD OFF OR FALL ASLEEP WHILE WATCHING TV: 1
HOW LIKELY ARE YOU TO NOD OFF OR FALL ASLEEP WHEN YOU ARE A PASSENGER IN A CAR FOR AN HOUR WITHOUT A BREAK: 0
HOW LIKELY ARE YOU TO NOD OFF OR FALL ASLEEP WHILE SITTING AND READING: 0

## 2021-11-15 NOTE — LETTER
[facility]  [address] [facility]  [address]  [place] [address]  Phone: [phone]  Fax: [phone]    Martin Fernández MD    November 15, 2021     [address] [address]    Patient: Reji Delgado   MR Number: [id number]   YOB: 1934   Date of Visit: 11/15/2021       Dear Ashley Martin: Thank you for referring Jean Carlos Amaya to me for evaluation/treatment. Below are the relevant portions of my assessment and plan of care. 1. Obstructive sleep apnea  Assessment & Plan:  Chronic-with progression/exacerbation:  Continue medications per her PCP and other physicians. Instructed not to drive unless had 4 hrs of effective therapy for her NELLY the night before. Did review the risks of under or untreated NELLY including, but not limited to, higher risks of motor vehicle accidents, stroke, heart attacks, and death. She understands and accepts all these risks. Needs to work with her [employer] company to see if there is a mask that has straps with fit in a different area on her head to reduce the pressure on the area she has had contusions and staples. Discussed with patient that we cannot prescribe Provigil for daytime sleepiness with untreated sleep apnea. She needs to get back onto using machine each and every night. Once we can show compliant usage of her machine then we can represcribe Provigil as needed for driving. Until then the patient should not be driving unless she is had effective therapy for least 4 hours a night before for her sleep apnea. Discussed with patient today the recent recall issued by [employer] for their [employer] platform Pap machines. Reviewed that it affected a very small number of machines (0.03%) and seems to be exacerbated by using ozone disinfection or machine being exposed to a very high heat/humidity environment.   Recommended the patient immediately discontinue use of any external ozone  if being used. Discussed the risk of untreated sleep apnea (including but not limited to early morbidity mortality, motor vehicle accidents, and cardiovascular issues, etc.) versus the very small risk of foam degradation with use of the machine. Possible alternative may be to switch manufacturers for their machine but will need to see if their insurance company will allow that. Reviewed, analyzed, and documented physiologic data from patient's PAP machine. This information was analyzed to assess complexity and medical decision making in regards to further testing and management. The encounter diagnosis was Obstructive sleep apnea. The chronic medical conditions listed are directly related to the primary diagnosis listed above. The management of the primary diagnosis affects the secondary diagnosis and vice versa. If you have questions, please do not hesitate to call me. I look forward to following Luis Reddy along with you.     Sincerely,      Martin Fernández MD

## 2021-11-15 NOTE — PROGRESS NOTES
Ronnie Trammell MD, [facility] Nolasco, [facility]  Tiffanie Kehrt CNP Cleatrice Coventry CNP [address]  [address] [facility], [address] [facility]- [phone]   [facility]   [address] [facility]. [address] [phone]     [address]  [address] [phone]      Assessment/Plan:      1. Obstructive sleep apnea  Assessment & Plan:  Chronic-with progression/exacerbation:  Continue medications per her PCP and other physicians. Instructed not to drive unless had 4 hrs of effective therapy for her ENLLY the night before. Did review the risks of under or untreated NELLY including, but not limited to, higher risks of motor vehicle accidents, stroke, heart attacks, and death. She understands and accepts all these risks. Needs to work with her [employer] company to see if there is a mask that has straps with fit in a different area on her head to reduce the pressure on the area she has had contusions and staples. Discussed with patient that we cannot prescribe Provigil for daytime sleepiness with untreated sleep apnea. She needs to get back onto using machine each and every night. Once we can show compliant usage of her machine then we can represcribe Provigil as needed for driving. Until then the patient should not be driving unless she is had effective therapy for least 4 hours a night before for her sleep apnea. Discussed with patient today the recent recall issued by [employer] for their Z2 platform Pap machines. Reviewed that it affected a very small number of machines (0.03%) and seems to be exacerbated by using ozone disinfection or machine being exposed to a very high heat/humidity environment. Recommended the patient immediately discontinue use of any external ozone  if being used.   Discussed the risk of untreated sleep apnea (including but not limited to early morbidity mortality, motor vehicle accidents, and cardiovascular issues, etc.) versus the very small risk of foam degradation with use of the machine. Possible alternative may be to switch manufacturers for their machine but will need to see if their insurance company will allow that. Reviewed, analyzed, and documented physiologic data from patient's PAP machine. This information was analyzed to assess complexity and medical decision making in regards to further testing and management. The encounter diagnosis was Obstructive sleep apnea. The chronic medical conditions listed are directly related to the primary diagnosis listed above. The management of the primary diagnosis affects the secondary diagnosis and vice versa. Subjective:   Subjective   Patient ID: Kya Vasquez is a 80 y.o. female. Chief Complaint   Patient presents with    Sleep Apnea       HPI:  Machine Modem/Download Info:  Compliance (hours/night): 0 hrs/night  % of nights >= 4 hrs: 0 %   APAP - Settings  Pressure Min: 12 cmH2O  Pressure Max: 20 cmH2O       Comfort Settings  Humidity Level (0-8): 3  Flex/EPR (0-3): 3       Stop using machine over a year ago, not really sure why she stopped. She feels she is sleeping well without her machine but is still very tired in the daytime. She has had several falls with contusions on her head which is made it difficult to wear her mask more recently because of how the straps hit the contusion.     DME Company - [employer]    Mode - Total score: 2    Social History     Socioeconomic History    Marital status:      Spouse name: Erasmo Phillips Number of children: 2    Years of education: Not on file    Highest education level: Not on file   Occupational History     Employer: RETIRED   Tobacco Use    Smoking status: Never Smoker    Smokeless tobacco: Never Used   Vaping Use    Vaping Use: Never used   Substance and Sexual Activity    Alcohol use: Yes     Comment: very rare occasions    Drug use: No    Sexual activity: Yes     Partners: Male   Other Topics Concern    Not on file   Social History Narrative    Not on file     Social Determinants of Health     Financial Resource Strain: Low Risk     Difficulty of Paying Living Expenses: Not hard at all   Food Insecurity: No Food Insecurity    Worried About Running Out of Food in the Last Year: Never true    [address] in the Last Year: Never true   Transportation Needs: No Transportation Needs    Lack of Transportation (Medical): No    Lack of Transportation (Non-Medical): No   Physical Activity: Inactive    Days of Exercise per Week: 0 days    Minutes of Exercise per Session: 0 min   Stress: No Stress Concern Present    Feeling of Stress : Not at all   Social Connections: Moderately Integrated    Frequency of Communication with Friends and Family:  Three times a week    Frequency of Social Gatherings with Friends and Family: Once a week    Attends Orthodoxy Services: More than 4 times per year    Active Member of [address] [place] [address] or Organizations: No    Attends Club or Organization Meetings: Never    Marital Status:    Intimate Partner Violence:     Fear of Current or Ex-Partner: Not on file    Emotionally Abused: Not on file    Physically Abused: Not on file    Sexually Abused: Not on file   Housing Stability: [facility] [address] Unable to Pay for Housing in the Last Year: No    Number of Places Lived in the Last Year: 1    Unstable Housing in the Last Year: No       Current Outpatient Medications   Medication Instructions    benazepril (LOTENSIN) 40 mg, Oral, DAILY    Calcium Polycarbophil (FIBER-CAPS PO) Oral    DULoxetine (CYMBALTA) 60 MG extended release capsule TAKE 1 CAPSULE DAILY    ergocalciferol (ERGOCALCIFEROL) 50,000 Units, Oral, WEEKLY    fluticasone (FLONASE) 50 MCG/ACT nasal spray USE 2 SPRAYS NASALLY DAILY    metoprolol tartrate (LOPRESSOR) 25 mg, Oral, EVERY EVENING    modafinil (PROVIGIL) 200 mg, Oral, PRN    Multiple Vitamins-Minerals (VISION VITAMINS PO) Oral    pantoprazole (PROTONIX) 40 MG tablet TAKE 1 TABLET DAILY    pravastatin (PRAVACHOL) 20 MG tablet TAKE 1 TABLET DAILY    vitamin B-6 (PYRIDOXINE) 100 mg, Oral, DAILY    vitamin E 400 Units, Oral, DAILY          Vitals:  Weight BMI   Wt Readings from Last 3 Encounters:   11/15/21 210 lb (95.3 kg)   11/01/21 202 lb 4.8 oz (91.8 kg)   10/19/21 208 lb 9.6 oz (94.6 kg)    Body mass index is 34.95 kg/m².      BP HR SaO2   BP Readings from Last 3 Encounters:   11/15/21 (!) 140/80   11/01/21 128/78   10/19/21 130/80    Pulse Readings from Last 3 Encounters:   11/15/21 105   11/01/21 83   10/19/21 67    SpO2 Readings from Last 3 Encounters:   11/15/21 95%   11/01/21 97%   10/19/21 97%        Electronically signed by Yanna Carballo MD on 11/15/2021 at 3:09 PM

## 2021-11-15 NOTE — ASSESSMENT & PLAN NOTE
Chronic-with progression/exacerbation:  Continue medications per her PCP and other physicians. Instructed not to drive unless had 4 hrs of effective therapy for her NELLY the night before. Did review the risks of under or untreated NELLY including, but not limited to, higher risks of motor vehicle accidents, stroke, heart attacks, and death. She understands and accepts all these risks. Needs to work with her [employer] company to see if there is a mask that has straps with fit in a different area on her head to reduce the pressure on the area she has had contusions and staples. Discussed with patient that we cannot prescribe Provigil for daytime sleepiness with untreated sleep apnea. She needs to get back onto using machine each and every night. Once we can show compliant usage of her machine then we can represcribe Provigil as needed for driving. Until then the patient should not be driving unless she is had effective therapy for least 4 hours a night before for her sleep apnea. Discussed with patient today the recent recall issued by [employer] for their [employer] platform Pap machines. Reviewed that it affected a very small number of machines (0.03%) and seems to be exacerbated by using ozone disinfection or machine being exposed to a very high heat/humidity environment. Recommended the patient immediately discontinue use of any external ozone  if being used. Discussed the risk of untreated sleep apnea (including but not limited to early morbidity mortality, motor vehicle accidents, and cardiovascular issues, etc.) versus the very small risk of foam degradation with use of the machine. Possible alternative may be to switch manufacturers for their machine but will need to see if their insurance company will allow that.

## 2021-11-15 NOTE — PROGRESS NOTES
Rosalina Chong         : 1934    Diagnosis: [x] NELLY (G47.33) [] CSA (G47.31) [] Apnea (G47.30)   Length of Need: [x] 13 Months [] 99 Months [] Other:    Machine (JOSEFA!): [] Respironics Dream Station      Auto [] ResMed AirSense     Auto [] Other:     []  CPAP () [] Bilevel ()   Mode: [] Auto [] Spontaneous    Mode: [] Auto [] Spontaneous            Comfort Settings:        Humidifier: [] Heated ()        [x] Water chamber replacement ()/ 1 per 6 months        Mask:   [x] Nasal () /1 per 3 months [] Full Face () /1 per 3 months   [x] Patient choice -Size and fit mask [] Patient Choice - Size and fit mask   [] Dispense:  [] Dispense:    [x] Headgear () / 1 per 3 months [] Headgear () / 1 per 3 months   [x] Replacement Nasal Cushion ()/2 per month [] Interface Replacement ()/1 per month   [x] Replacement Nasal Pillows ()/2 per month         Tubing: [x] Heated ()/1 per 3 months    [] Standard ()/1 per 3 months [] Other:           Filters: [x] Non-disposable ()/1 per 6 months     [x] Ultra-Fine, Disposable ()/2 per month        Miscellaneous: [] Chin Strap ()/ 1 per 6 months [] O2 bleed-in:       LPM   [] Oximetry on CPAP/Bilevel []  Other:    [x] Modem: ()         Start Order Date: 11/15/21    MEDICAL JUSTIFICATION:  I, the undersigned, certify that the above prescribed supplies are medically necessary for this patients wellbeing. In my opinion, the supplies are both reasonable and necessary in reference to accepted standards of medicalpractice in treatment of this patients condition.     Danisha Vallejo MD      NPI: [id number]       Order Signed Date: 11/15/21    Electronically signed by Danisha Vallejo MD on 11/15/2021 at 3:10 PM    Rosalina Chong  1934  [address]  [phone] (home)   [phone] (mobile)      Insurance Info (confirm with patient if correct):  Payor/Plan Subscr  Sex Relation Sub.  Ins. ID Effective Group Num

## 2021-11-29 ENCOUNTER — OFFICE VISIT (OUTPATIENT)
Dept: PRIMARY CARE CLINIC | Age: 86
End: 2021-11-29
Payer: MEDICARE

## 2021-11-29 ENCOUNTER — HOSPITAL ENCOUNTER (OUTPATIENT)
Dept: GENERAL RADIOLOGY | Age: 86
Discharge: HOME | End: 2021-11-29
Payer: MEDICARE

## 2021-11-29 VITALS
DIASTOLIC BLOOD PRESSURE: 88 MMHG | BODY MASS INDEX: 34.99 KG/M2 | OXYGEN SATURATION: 98 % | WEIGHT: 210 LBS | SYSTOLIC BLOOD PRESSURE: 138 MMHG | HEART RATE: 121 BPM | HEIGHT: 65 IN | RESPIRATION RATE: 14 BRPM | TEMPERATURE: 97.2 F

## 2021-11-29 DIAGNOSIS — M79.672 LEFT FOOT PAIN: Primary | ICD-10-CM

## 2021-11-29 DIAGNOSIS — M79.672 LEFT FOOT PAIN: ICD-10-CM

## 2021-11-29 DIAGNOSIS — M79.89 FOOT SWELLING: ICD-10-CM

## 2021-11-29 PROCEDURE — 99214 OFFICE O/P EST MOD 30 MIN: CPT | Performed by: FAMILY MEDICINE

## 2021-11-29 PROCEDURE — 73620 X-RAY EXAM OF FOOT: CPT

## 2021-11-29 RX ORDER — ERGOCALCIFEROL 1.25 MG/1
CAPSULE ORAL
Qty: 4 CAPSULE | Refills: 5 | Status: SHIPPED | OUTPATIENT
Start: 2021-11-29 | End: 2022-02-02 | Stop reason: SDUPTHER

## 2021-11-29 RX ORDER — AMOXICILLIN AND CLAVULANATE POTASSIUM 875; 125 MG/1; MG/1
1 TABLET, FILM COATED ORAL 2 TIMES DAILY
Qty: 20 TABLET | Refills: 0 | Status: SHIPPED | OUTPATIENT
Start: 2021-11-29 | End: 2021-12-03

## 2021-11-29 ASSESSMENT — ENCOUNTER SYMPTOMS
RESPIRATORY NEGATIVE: 1
EYES NEGATIVE: 1
GASTROINTESTINAL NEGATIVE: 1

## 2021-11-29 NOTE — PROGRESS NOTES
SUBJECTIVE:  Patient ID: Cookie Fletcher is a 80 y.o. y.o. female     HPI   Patient is here with her  about her left foot swelling it hurts when she walks on it according to the patient she has been there for quite some time she recalls that after I asked her multiple times like November 15 she had a fall in the had to drag her back into the house she so she thinks she may hurt her foot then she is able to walk with a little bit discomfort she is a walker anyway  She recently diagnosed with Parkinson's see neurology started medication  Red and swollen, possible according to the  she went away  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 replacement therapy     Rectocele 1970s    Seasonal allergies     Sleep apnea 2010    Spinal stenosis of lumbar region     Gets epidural injections every 6 months      Past Surgical History:   Procedure Laterality Date    BREAST BIOPSY Right 5/3/13    biopsy    BREAST LUMPECTOMY  9/27/05    right lumpectomy   [address]  1/08    bilateral    COLONOSCOPY  2005    30, R7X 7X 2005    CYSTOCELE REPAIR  1989    DILATION AND CURETTAGE OF UTERUS  yrs ago    DOPPLER ECHOCARDIOGRAPHY  2007    EYE SURGERY      bilat catarats    HYSTERECTOMY  4/1974    JOINT REPLACEMENT      left hip   [address]    MOHS SURGERY  2/08    nose    OTHER SURGICAL HISTORY  12/22/06    epidural    [address] REMOVAL  june 2004 sutures in 3 fingers    TONSILLECTOMY      TOTAL HIP ARTHROPLASTY  2006    L     Family History   Problem Relation Age of Onset    Cancer Brother         Prostate    Cancer Mother         Stomach Cancer at age 80    Stroke Father     Cancer Paternal Grandmother         intraadbominal at age 80    Cancer Other         Leukemia age 70     Social History     Socioeconomic History    Marital status:      Spouse name: Myah Lassiter Number of children: 2    Years of education: Not on file    Highest education level: Not on file   Occupational History     Employer: RETIRED   Tobacco Use    Smoking status: Never Smoker    Smokeless tobacco: Never Used   Vaping Use    Vaping Use: Never used   Substance and Sexual Activity    Alcohol use: Yes     Comment: very rare occasions    Drug use: No    Sexual activity: Yes     Partners: Male   Other Topics Concern    Not on file   Social History Narrative    Not on file     Social Determinants of Health     Financial Resource Strain: Low Risk     Difficulty of Paying Living Expenses: Not hard at all   Food Insecurity: No Food Insecurity    Worried About [address] in the Last Year: Never true    [facility] in the Last Year: Never true   Transportation Needs: No Transportation Needs    Lack of Transportation (Medical): No    Lack of Transportation (Non-Medical): No   Physical Activity: Inactive    Days of Exercise per Week: 0 days    Minutes of Exercise per Session: 0 min   Stress: No Stress Concern Present    Feeling of Stress : Not at all   Social Connections: Moderately Integrated    Frequency of Communication with Friends and Family:  Three times a week    Frequency of Social Gatherings with Friends and Family: Once a week    Attends Congregation Services: More than 4 times per year    Active Member of [address] [place] [address] [employer] or Organizations: No    Attends Club or Organization Meetings: Never    Marital Status:    Intimate Partner Violence:     Fear of Current or Ex-Partner: Not on file    Emotionally Abused: Not on file    Physically Abused: Not on file    Sexually Abused: Not on file   Housing Stability: [address] Unable to Pay for Housing in the Last Year: No    Number of Places Lived in the Last Year: 1    Unstable Housing in the Last Year: No     Current Outpatient Medications   Medication Sig Dispense Refill    metoprolol tartrate (LOPRESSOR) 25 MG tablet Take 1 tablet by mouth every evening 30 tablet 2    benazepril (LOTENSIN) 40 MG tablet Take 1 tablet by mouth daily (Patient taking differently: Take 40 mg by mouth daily In the morning) 90 tablet 0    pravastatin (PRAVACHOL) 20 MG tablet TAKE 1 TABLET DAILY 90 tablet 3    pantoprazole (PROTONIX) 40 MG tablet TAKE 1 TABLET DAILY 90 tablet 3    ergocalciferol (ERGOCALCIFEROL) 1.25 MG (34008 UT) capsule Take 1 capsule by mouth once a week 4 capsule 5    DULoxetine (CYMBALTA) 60 MG extended release capsule TAKE 1 CAPSULE DAILY 90 capsule 1    fluticasone (FLONASE) 50 MCG/ACT nasal spray USE 2 SPRAYS NASALLY DAILY 48 g 3    vitamin B-6 (PYRIDOXINE) 100 MG tablet Take 100 mg by mouth daily      vitamin E 400 UNIT capsule Take 400 Units by mouth daily      modafinil (PROVIGIL) 200 MG tablet Take 200 mg by mouth as needed.  Calcium Polycarbophil (FIBER-CAPS PO) Take by mouth      Multiple Vitamins-Minerals (VISION VITAMINS PO) Take by mouth       No current facility-administered medications for this visit. Allergies   Allergen Reactions    Lodine [Etodolac] Rash       Review of Systems   Constitutional: Negative. HENT: Negative. Eyes: Negative. Respiratory: Negative. Cardiovascular: Negative. Gastrointestinal: Negative. Musculoskeletal: Positive for arthralgias and joint swelling. All other systems reviewed and are negative.       OBJECTIVE:  /88 (Site: Left Upper Arm, Position: Sitting, Cuff Size: Large Adult)   Pulse 121   Temp 97.2 °F (36.2 °C) (Temporal)   Resp 14   Ht 5' 5\" (1.651 m)   Wt 210 lb (95.3 kg)   SpO2 98%   BMI 34.95 kg/m²     Physical Exam  Vitals reviewed. Eyes:      General: No scleral icterus. Conjunctiva/sclera: Conjunctivae normal.   Neck:      Thyroid: No thyromegaly. Vascular: No JVD. Cardiovascular:      Rate and Rhythm: Normal rate and regular rhythm. Heart sounds: Normal heart sounds. No murmur heard. No friction rub. No gallop. Pulmonary:      Effort: Pulmonary effort is normal.      Breath sounds: Normal breath sounds. No wheezing or rales. Abdominal:      General: Bowel sounds are normal. There is no distension. Palpations: Abdomen is soft. There is no mass. Tenderness: There is no abdominal tenderness. Hernia: No hernia is present. Musculoskeletal:      Comments: Left forefoot swollen tender erythematous there is a few small bruises at the tip of the third fourth digit looks like she had skin peel over the dorsum   Lymphadenopathy:      Cervical: No cervical adenopathy. Skin:     Findings: No rash. Neurological:      Mental Status: She is alert and oriented to person, place, and time. ASSESSMENT:     Diagnosis Orders   1. Left foot pain  XR FOOT LEFT (2 VIEWS)    Ambulatory referral to Podiatry   2.  Foot swelling  XR FOOT LEFT (2 VIEWS)       PLAN:  See orders  Close follow-up  May need to be seen by Ortho or podiatry

## 2021-11-29 NOTE — TELEPHONE ENCOUNTER
Medication:   Requested Prescriptions     Pending Prescriptions Disp Refills    vitamin D (ERGOCALCIFEROL) 1.25 MG (15760 UT) CAPS capsule [Pharmacy Med Name: VIT D2 (ERGOCAL) 1.25MG(50,000U) CP] 4 capsule 5     Sig: TAKE ONE CAPSULE BY MOUTH ONCE WEEKLY       Last appt: 11/1/2021   Next appt: Visit date not found    Last OARRS:   RX Monitoring 6/3/2019   Attestation The Prescription Monitoring Report for this patient was reviewed today.    Periodic Controlled Substance Monitoring -

## 2021-12-03 ENCOUNTER — TELEPHONE (OUTPATIENT)
Dept: PRIMARY CARE CLINIC | Age: 86
End: 2021-12-03

## 2021-12-03 ENCOUNTER — OFFICE VISIT (OUTPATIENT)
Dept: PRIMARY CARE CLINIC | Age: 86
End: 2021-12-03
Payer: MEDICARE

## 2021-12-03 VITALS
DIASTOLIC BLOOD PRESSURE: 90 MMHG | TEMPERATURE: 97.2 F | WEIGHT: 211 LBS | OXYGEN SATURATION: 97 % | RESPIRATION RATE: 14 BRPM | HEART RATE: 97 BPM | SYSTOLIC BLOOD PRESSURE: 150 MMHG | BODY MASS INDEX: 35.11 KG/M2

## 2021-12-03 DIAGNOSIS — E66.01 SEVERE OBESITY (BMI 35.0-35.9 WITH COMORBIDITY) (HCC): ICD-10-CM

## 2021-12-03 DIAGNOSIS — L03.116 CELLULITIS OF LEFT LOWER EXTREMITY: Primary | ICD-10-CM

## 2021-12-03 PROCEDURE — 99213 OFFICE O/P EST LOW 20 MIN: CPT | Performed by: FAMILY MEDICINE

## 2021-12-03 RX ORDER — MUPIROCIN CALCIUM 20 MG/G
CREAM TOPICAL
Qty: 30 G | Refills: 0 | Status: SHIPPED | OUTPATIENT
Start: 2021-12-03 | End: 2022-01-02

## 2021-12-03 RX ORDER — SULFAMETHOXAZOLE AND TRIMETHOPRIM 800; 160 MG/1; MG/1
1 TABLET ORAL 2 TIMES DAILY
Qty: 20 TABLET | Refills: 0 | Status: SHIPPED | OUTPATIENT
Start: 2021-12-03 | End: 2021-12-13

## 2021-12-03 RX ORDER — SULFAMETHOXAZOLE AND TRIMETHOPRIM 800; 160 MG/1; MG/1
1 TABLET ORAL 2 TIMES DAILY
Qty: 10 TABLET | Refills: 0 | Status: SHIPPED | OUTPATIENT
Start: 2021-12-03 | End: 2021-12-03

## 2021-12-03 ASSESSMENT — ENCOUNTER SYMPTOMS
EYES NEGATIVE: 1
GASTROINTESTINAL NEGATIVE: 1
RESPIRATORY NEGATIVE: 1

## 2021-12-03 NOTE — TELEPHONE ENCOUNTER
Pharmacy is calling. The insurance will not cover the Bactroban Cream. They will cover the ointment.  Can they give the pt the ointment instead?    lov 12/3/21

## 2021-12-03 NOTE — PROGRESS NOTES
SUBJECTIVE:  Patient ID: Shaheed Montaño is a 80 y.o. y.o. 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 replacement therapy     Rectocele 1970s    Seasonal allergies     Sleep apnea 2010    Spinal stenosis of lumbar region     Gets epidural injections every 6 months      Past Surgical History:   Procedure Laterality Date    BREAST BIOPSY Right 5/3/13    biopsy    BREAST LUMPECTOMY  9/27/05    right lumpectomy   [address] [place]  1/08    bilateral    COLONOSCOPY  2005    30, R7X 7X 2005    CYSTOCELE REPAIR  1989    DILATION AND CURETTAGE OF UTERUS  yrs ago    DOPPLER ECHOCARDIOGRAPHY  2007    EYE SURGERY      bilat catarats    HYSTERECTOMY  4/1974    JOINT REPLACEMENT      left hip   [address]    MOHS SURGERY  2/08    nose    OTHER SURGICAL HISTORY  12/22/06    epidural    [facility]    SUTURE REMOVAL  june 2004    sutures in 3 fingers    TONSILLECTOMY      TOTAL HIP ARTHROPLASTY  2006    L     Family History   Problem Relation Age of Onset    Cancer Brother         Prostate    Cancer Mother         Stomach Cancer at age 80    Stroke Father     Cancer Paternal Grandmother         intraadbominal at age 80    Cancer Other         Leukemia age 70     Social History     Socioeconomic History    Marital status:      Spouse name: Zan Aquino Number of children: 2    Years of education: None    Highest education level: None   Occupational History     Employer: RETIRED   Tobacco Use    Smoking status: Never Smoker    Smokeless tobacco: Never Used   Vaping Use    Vaping Use: Never used   Substance and Sexual Activity    Alcohol use: Yes     Comment: very rare occasions    Drug use: No    Sexual activity: Yes     Partners: Male   Other Topics Concern    None   Social History Narrative    None     Social Determinants of Health     Financial Resource Strain: Low Risk     Difficulty of Paying Living Expenses: Not hard at all   Food Insecurity: No Food Insecurity    Worried About Running Out of Food in the Last Year: Never true    Leydi of Food in the Last Year: Never true   Transportation Needs: No Transportation Needs    Lack of Transportation (Medical): No    Lack of Transportation (Non-Medical): No   Physical Activity: Inactive    Days of Exercise per Week: 0 days    Minutes of Exercise per Session: 0 min   Stress: No Stress Concern Present    Feeling of Stress : Not at all   Social Connections: Moderately Integrated    Frequency of Communication with Friends and Family:  Three times a week    Frequency of Social Gatherings with Friends and Family: Once a week    Attends Mandaen Services: More than 4 times per year    Active Member of [address] [place] [address] or Organizations: No    Attends Club or Organization Meetings: Never    Marital Status:    Intimate Partner Violence:     Fear of Current or Ex-Partner: Not on file    Emotionally Abused: Not on file    Physically Abused: Not on file    Sexually Abused: Not on file   Housing Stability: Low Risk     Unable to Pay for Housing in the Last Year: No    Number of Places Lived in the Last Year: 1    Unstable Housing in the Last Year: No     Current Outpatient Medications   Medication Sig Dispense Refill    mupirocin (BACTROBAN) 2 % cream Apply 3 times daily. 30 g 0    sulfamethoxazole-trimethoprim (BACTRIM DS;SEPTRA DS) 800-160 MG per tablet Take 1 tablet by mouth 2 times daily for 10 days 20 tablet 0    vitamin D (ERGOCALCIFEROL) 1.25 MG (89619 UT) CAPS capsule TAKE ONE CAPSULE BY MOUTH ONCE WEEKLY 4 capsule 5    metoprolol tartrate (LOPRESSOR) 25 MG tablet Take 1 tablet by mouth every evening 30 tablet 2    benazepril (LOTENSIN) 40 MG tablet Take 1 tablet by mouth daily (Patient taking differently: Take 40 mg by mouth daily In the morning) 90 tablet 0    pravastatin (PRAVACHOL) 20 MG tablet TAKE 1 TABLET DAILY 90 tablet 3    pantoprazole (PROTONIX) 40 MG tablet TAKE 1 TABLET DAILY 90 tablet 3    DULoxetine (CYMBALTA) 60 MG extended release capsule TAKE 1 CAPSULE DAILY 90 capsule 1    fluticasone (FLONASE) 50 MCG/ACT nasal spray USE 2 SPRAYS NASALLY DAILY 48 g 3    vitamin B-6 (PYRIDOXINE) 100 MG tablet Take 100 mg by mouth daily      vitamin E 400 UNIT capsule Take 400 Units by mouth daily      modafinil (PROVIGIL) 200 MG tablet Take 200 mg by mouth as needed.  Calcium Polycarbophil (FIBER-CAPS PO) Take by mouth      Multiple Vitamins-Minerals (VISION VITAMINS PO) Take by mouth      mupirocin (BACTROBAN) 2 % ointment Apply topically 3 times daily. 30 g 1     No current facility-administered medications for this visit. Allergies   Allergen Reactions    Lodine [Etodolac] Rash       Review of Systems   Constitutional: Negative. HENT: Negative. Eyes: Negative. Respiratory: Negative. Cardiovascular: Negative. Gastrointestinal: Negative. Musculoskeletal: Positive for arthralgias and joint swelling.    All other systems reviewed and are negative. OBJECTIVE:  BP (!) 150/90   Pulse 97   Temp 97.2 °F (36.2 °C) (Temporal)   Resp 14   Wt 211 lb (95.7 kg)   SpO2 97%   BMI 35.11 kg/m²     Physical Exam  Vitals reviewed. Eyes:      General: No scleral icterus. Conjunctiva/sclera: Conjunctivae normal.   Neck:      Thyroid: No thyromegaly. Vascular: No JVD. Cardiovascular:      Rate and Rhythm: Normal rate and regular rhythm. Heart sounds: Normal heart sounds. No murmur heard. No friction rub. No gallop. Pulmonary:      Effort: Pulmonary effort is normal.      Breath sounds: Normal breath sounds. No wheezing or rales. Abdominal:      General: Bowel sounds are normal. There is no distension. Palpations: Abdomen is soft. There is no mass. Tenderness: There is no abdominal tenderness. Hernia: No hernia is present. Musculoskeletal:      Comments: Left forefoot swollen tender erythematous there is a few small bruises at the tip of the third fourth digit looks like she had skin peel over the dorsum   Lymphadenopathy:      Cervical: No cervical adenopathy. Skin:     Findings: No rash. Neurological:      Mental Status: She is alert and oriented to person, place, and time. ASSESSMENT:     Diagnosis Orders   1. Cellulitis of left lower extremity  Culture, Wound   2.  Severe obesity (BMI 35.0-35.9 with comorbidity) ([facility])         PLAN:  See orders  Off Augmentin  Bactrim and Bactroban sent to her pharmacy  Abnormal blood pressure reading continue to monitor at home   close follow-up with me

## 2021-12-06 LAB
GRAM STAIN RESULT: NORMAL
WOUND/ABSCESS: NORMAL

## 2021-12-06 RX ORDER — DULOXETIN HYDROCHLORIDE 60 MG/1
CAPSULE, DELAYED RELEASE ORAL
Qty: 90 CAPSULE | Refills: 1 | Status: SHIPPED | OUTPATIENT
Start: 2021-12-06 | End: 2022-02-15 | Stop reason: SDUPTHER

## 2021-12-06 NOTE — TELEPHONE ENCOUNTER
Please send to Louisa [facility], Pt is out        Medication:   Requested Prescriptions     Pending Prescriptions Disp Refills    DULoxetine (CYMBALTA) 60 MG extended release capsule 90 capsule 1     Sig: TAKE 1 CAPSULE DAILY       Last appt: 12/3/2021   Next appt: Visit date not found    Last OARRS:   RX Monitoring 6/3/2019   Attestation The Prescription Monitoring Report for this patient was reviewed today.    Periodic Controlled Substance Monitoring -

## 2022-01-27 DIAGNOSIS — I10 ESSENTIAL HYPERTENSION: ICD-10-CM

## 2022-01-28 RX ORDER — FUROSEMIDE 20 MG/1
TABLET ORAL
Qty: 30 TABLET | Refills: 3 | Status: SHIPPED | OUTPATIENT
Start: 2022-01-28 | End: 2022-04-19

## 2022-01-28 NOTE — TELEPHONE ENCOUNTER
Medication:   Requested Prescriptions     Pending Prescriptions Disp Refills    furosemide (LASIX) 20 MG tablet [Pharmacy Med Name: FUROSEMIDE 20 MG TABLET] 30 tablet 3     Sig: TAKE ONE TABLET BY MOUTH EVERY MORNING     Last Filled:  8/7/2020    Last appt: 12/3/2021   Next appt: Visit date not found

## 2022-02-02 DIAGNOSIS — I10 ESSENTIAL HYPERTENSION: ICD-10-CM

## 2022-02-02 RX ORDER — BENAZEPRIL HYDROCHLORIDE 40 MG/1
TABLET, FILM COATED ORAL
Qty: 90 TABLET | Refills: 0 | Status: SHIPPED | OUTPATIENT
Start: 2022-02-02 | End: 2022-02-15

## 2022-02-02 NOTE — TELEPHONE ENCOUNTER
Medication:   Requested Prescriptions     Pending Prescriptions Disp Refills    benazepril (LOTENSIN) 40 MG tablet [Pharmacy Med Name: BENAZEPRIL HCL 40 MG TABLET] 90 tablet 0     Sig: TAKE ONE TABLET BY MOUTH DAILY       Last appt: 12/3/2021   Next appt: Visit date not found    Last OARRS:   RX Monitoring 6/3/2019   Attestation The Prescription Monitoring Report for this patient was reviewed today.    Periodic Controlled Substance Monitoring -

## 2022-02-02 NOTE — TELEPHONE ENCOUNTER
Patient has asked that Dr. Hernandez Friend give her a call back when she can regarding her medications,       Patient needs a refill on the following medication     pravastatin (PRAVACHOL) 20 MG tablet [[id number]]     Order Details  Dose, Route, Frequency: As Directed   Dispense Quantity: 90 tablet Refills: 3          Sig: TAKE 1 TABLET DAILY       vitamin D (ERGOCALCIFEROL) 1.25 MG (71968 UT) CAPS capsule [[id number]]     Order Details  Dose, Route, Frequency: As Directed   Dispense Quantity: 4 capsule Refills: 5          Sig: TAKE ONE CAPSULE BY MOUTH ONCE WEEKLY           benazepril (LOTENSIN) 40 MG tablet [[id number]]     Order Details  Dose: 40 mg Route: Oral Frequency: DAILY   Dispense Quantity: 90 tablet Refills: 0          Sig: Take 1 tablet by mouth daily   Patient taking differently: Take 40 mg by mouth daily In the morning     On the vitamin D she would like to know if she can get more then QTY 4 so they don't have to going to the pharmacy so often.      Pharmacy    [address] [place] [address] [address] [facility] [phone] [facility] [phone]   [facility] [place], [address] [place] [address] [address] [address]   Phone:  [phone]  Fax:  [phone]     Thank you

## 2022-02-07 RX ORDER — BENAZEPRIL HYDROCHLORIDE 40 MG/1
40 TABLET, FILM COATED ORAL DAILY
Qty: 90 TABLET | Refills: 0 | Status: SHIPPED | OUTPATIENT
Start: 2022-02-07 | End: 2022-08-04

## 2022-02-07 RX ORDER — ERGOCALCIFEROL 1.25 MG/1
CAPSULE ORAL
Qty: 4 CAPSULE | Refills: 5 | Status: SHIPPED | OUTPATIENT
Start: 2022-02-07 | End: 2022-04-18 | Stop reason: SDUPTHER

## 2022-02-07 RX ORDER — PRAVASTATIN SODIUM 20 MG
TABLET ORAL
Qty: 90 TABLET | Refills: 3 | Status: SHIPPED | OUTPATIENT
Start: 2022-02-07 | End: 2022-07-05

## 2022-02-11 DIAGNOSIS — K21.9 GASTROESOPHAGEAL REFLUX DISEASE WITHOUT ESOPHAGITIS: Chronic | ICD-10-CM

## 2022-02-11 NOTE — TELEPHONE ENCOUNTER
Medication:   Requested Prescriptions     Pending Prescriptions Disp Refills    pantoprazole (PROTONIX) 40 MG tablet 90 tablet 3     Last Filled: 5.27.21    Last appt: 10/19/2021   Next appt: Visit date not found    Last OARRS:   RX Monitoring 6/3/2019   Attestation The Prescription Monitoring Report for this patient was reviewed today.    Periodic Controlled Substance Monitoring -

## 2022-02-11 NOTE — TELEPHONE ENCOUNTER
----- Message from Luis Adair sent at 2/11/2022  3:47 PM EST -----  Subject: Refill Request    QUESTIONS  Name of Medication? pantoprazole (PROTONIX) 40 MG tablet  Patient-reported dosage and instructions? Patient takes 1 40 mg tablet per   day  How many days do you have left? 50  Preferred Pharmacy? [address]  Pharmacy phone number (if available)? [phone]  ---------------------------------------------------------------------------  --------------  Victorino MASTERS  What is the best way for the office to contact you? OK to leave message on   voicemail  Preferred Call Back Phone Number?  [phone]

## 2022-02-11 NOTE — TELEPHONE ENCOUNTER
----- Message from Orin Lindsey sent at 2/11/2022  3:41 PM EST -----  Subject: Message to Provider    QUESTIONS  Information for Provider? Patient was prescribed Hydrochlorothiazide 12.5   mg and is taking1 capsule daily by her podiatrist and was told that her   PCP could refill this, but patient is wondering whether or not she still   needs this medication.   ---------------------------------------------------------------------------  --------------  CALL BACK INFO  What is the best way for the office to contact you? OK to leave message on   voicemail  Preferred Call Back Phone Number? [phone]  ---------------------------------------------------------------------------  --------------  SCRIPT ANSWERS  Relationship to Patient?  Self

## 2022-02-14 RX ORDER — PANTOPRAZOLE SODIUM 40 MG/1
40 TABLET, DELAYED RELEASE ORAL DAILY
Qty: 90 TABLET | Refills: 3 | Status: SHIPPED | OUTPATIENT
Start: 2022-02-14

## 2022-02-14 NOTE — TELEPHONE ENCOUNTER
Pt is calling back. She says the HCTZ 25 mg was being prescribed by another doctor but the doctor told her to have the HCTZ be prescribed by Dr Maciel Matta now.

## 2022-02-15 ENCOUNTER — OFFICE VISIT (OUTPATIENT)
Dept: PRIMARY CARE CLINIC | Age: 87
End: 2022-02-15
Payer: MEDICARE

## 2022-02-15 VITALS
BODY MASS INDEX: 34.31 KG/M2 | HEART RATE: 105 BPM | SYSTOLIC BLOOD PRESSURE: 125 MMHG | TEMPERATURE: 98.2 F | WEIGHT: 206.2 LBS | RESPIRATION RATE: 14 BRPM | OXYGEN SATURATION: 97 % | DIASTOLIC BLOOD PRESSURE: 70 MMHG

## 2022-02-15 DIAGNOSIS — R53.83 FATIGUE, UNSPECIFIED TYPE: ICD-10-CM

## 2022-02-15 DIAGNOSIS — E78.2 MIXED HYPERLIPIDEMIA: ICD-10-CM

## 2022-02-15 DIAGNOSIS — R25.1 TREMOR: ICD-10-CM

## 2022-02-15 DIAGNOSIS — I10 ESSENTIAL HYPERTENSION: Primary | ICD-10-CM

## 2022-02-15 DIAGNOSIS — F33.0 MAJOR DEPRESSIVE DISORDER, RECURRENT, MILD (HCC): ICD-10-CM

## 2022-02-15 DIAGNOSIS — I10 ESSENTIAL HYPERTENSION: ICD-10-CM

## 2022-02-15 DIAGNOSIS — K21.9 GASTROESOPHAGEAL REFLUX DISEASE, UNSPECIFIED WHETHER ESOPHAGITIS PRESENT: ICD-10-CM

## 2022-02-15 LAB
ALBUMIN SERPL-MCNC: 4.3 G/DL (ref 3.4–5)
ALP BLD-CCNC: 58 U/L (ref 40–129)
ALT SERPL-CCNC: 16 U/L (ref 10–40)
ANION GAP SERPL CALCULATED.3IONS-SCNC: 16 MMOL/L (ref 3–16)
AST SERPL-CCNC: 25 U/L (ref 15–37)
BILIRUB SERPL-MCNC: 0.6 MG/DL (ref 0–1)
BILIRUBIN DIRECT: <0.2 MG/DL (ref 0–0.3)
BILIRUBIN, INDIRECT: NORMAL MG/DL (ref 0–1)
BILIRUBIN, POC: ABNORMAL
BLOOD URINE, POC: ABNORMAL
BUN BLDV-MCNC: 18 MG/DL (ref 7–20)
CALCIUM SERPL-MCNC: 9.9 MG/DL (ref 8.3–10.6)
CHLORIDE BLD-SCNC: 98 MMOL/L (ref 99–110)
CLARITY, POC: CLEAR
CO2: 27 MMOL/L (ref 21–32)
COLOR, POC: ABNORMAL
CREAT SERPL-MCNC: 0.9 MG/DL (ref 0.6–1.2)
FOLATE: 6.26 NG/ML (ref 4.78–24.2)
GFR AFRICAN AMERICAN: >60
GFR NON-AFRICAN AMERICAN: 59
GLUCOSE BLD-MCNC: 143 MG/DL (ref 70–99)
GLUCOSE URINE, POC: ABNORMAL
HCT VFR BLD CALC: 40.2 % (ref 36–48)
HEMOGLOBIN: 13.7 G/DL (ref 12–16)
KETONES, POC: ABNORMAL
LEUKOCYTE EST, POC: ABNORMAL
MCH RBC QN AUTO: 32.9 PG (ref 26–34)
MCHC RBC AUTO-ENTMCNC: 34.1 G/DL (ref 31–36)
MCV RBC AUTO: 96.3 FL (ref 80–100)
NITRITE, POC: ABNORMAL
PDW BLD-RTO: 14.1 % (ref 12.4–15.4)
PH, POC: 7
PLATELET # BLD: 236 K/UL (ref 135–450)
PMV BLD AUTO: 9.2 FL (ref 5–10.5)
POTASSIUM SERPL-SCNC: 3.6 MMOL/L (ref 3.5–5.1)
PROTEIN, POC: ABNORMAL
RBC # BLD: 4.17 M/UL (ref 4–5.2)
SODIUM BLD-SCNC: 141 MMOL/L (ref 136–145)
SPECIFIC GRAVITY, POC: 1.01
TOTAL PROTEIN: 6.5 G/DL (ref 6.4–8.2)
TSH SERPL DL<=0.05 MIU/L-ACNC: 1.58 UIU/ML (ref 0.27–4.2)
UROBILINOGEN, POC: 0.2
VITAMIN B-12: >2000 PG/ML (ref 211–911)
WBC # BLD: 8.6 K/UL (ref 4–11)

## 2022-02-15 PROCEDURE — 99214 OFFICE O/P EST MOD 30 MIN: CPT | Performed by: FAMILY MEDICINE

## 2022-02-15 PROCEDURE — 81002 URINALYSIS NONAUTO W/O SCOPE: CPT | Performed by: FAMILY MEDICINE

## 2022-02-15 RX ORDER — HYDROCHLOROTHIAZIDE 12.5 MG/1
1 CAPSULE, GELATIN COATED ORAL DAILY
COMMUNITY
Start: 2022-01-10 | End: 2022-02-15

## 2022-02-15 RX ORDER — DULOXETIN HYDROCHLORIDE 60 MG/1
CAPSULE, DELAYED RELEASE ORAL
Qty: 90 CAPSULE | Refills: 1 | Status: SHIPPED | OUTPATIENT
Start: 2022-02-15 | End: 2022-05-09

## 2022-02-15 ASSESSMENT — ENCOUNTER SYMPTOMS
EYES NEGATIVE: 1
GASTROINTESTINAL NEGATIVE: 1
RESPIRATORY NEGATIVE: 1

## 2022-02-15 NOTE — PROGRESS NOTES
SUBJECTIVE:  Patient ID: Vika Clement is a 80 y.o. y.o. female       Hypertension: Patient here for follow-up of elevated blood pressure. She is not exercising and is adherent to low salt diet.  Blood pressure is well controlled at home. Cardiac symptoms none. Patient denies chest pain, chest pressure/discomfort, claudication, dyspnea, exertional chest pressure/discomfort, fatigue, irregular heart beat, lower extremity edema, near-syncope, orthopnea, palpitations, paroxysmal nocturnal dyspnea and syncope.  Cardiovascular risk factors: advanced age (older than 54 for men, 72 for women), dyslipidemia, hypertension and obesity (BMI >= 30 kg/m2). Use of agents associated with hypertension: none. History of target organ damage: none.   She brought her cuff with her which was not comparable to our readings advised the patient and her  to buy a new one  Patient with GERD stable on medication  She has been feeling tired lately she had history of sleep apnea she has not been using her CPAP and she has not seen her sleep specialist for a while  2 recommend following up with her sleep specialist to make an appointment today  With tremors she was seen by neurologist on medication seems stable and she feels that way  With back pain unsteady feet she has been continuing with the physical therapy she is using a cane she use a walker at home  When she had the foot infection she was seen by a podiatrist he put on hydrochlorothiazide low-dose now he wants me to prescribe it she is already on first  I do think she needs to be on both  Blood pressure with her own monitor was close to ours  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    Carcinoma nos     Chronic back pain     GERD (gastroesophageal reflux disease)     Hypercholesterolemia     Hyperlipidemia     Hypertension     Major depressive disorder, recurrent, 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    Menopause age unsure    Non morbid obesity, unspecified obesity type 12/4/2018    Obstructive sleep apnea 8/5/2014    Pneumonia     Pneumonia     Postmenopausal hormone replacement therapy     Rectocele 1970s    Seasonal allergies     Sleep apnea 2010    Spinal stenosis of lumbar region     Gets epidural injections every 6 months      Past Surgical History:   Procedure Laterality Date    BREAST BIOPSY Right 5/3/13    biopsy    BREAST LUMPECTOMY  9/27/05    right lumpectomy   [address]  1/08    bilateral    COLONOSCOPY  2005    30, R7X 7X 2005    CYSTOCELE REPAIR  1989    DILATION AND CURETTAGE OF UTERUS  yrs ago    DOPPLER ECHOCARDIOGRAPHY  2007    EYE SURGERY      bilat catarats    HYSTERECTOMY  4/1974    JOINT REPLACEMENT      left hip   [facility]    MOHS SURGERY  2/08    nose    OTHER SURGICAL HISTORY  12/22/06    epidural    [facility]    SUTURE REMOVAL  june 2004    sutures in 3 fingers    TONSILLECTOMY      TOTAL HIP ARTHROPLASTY  2006    L     Family History   Problem Relation Age of Onset    Cancer Brother         Prostate    Cancer Mother         Stomach Cancer at age 80    Stroke Father     Cancer Paternal Grandmother         intraadbominal at age 80    Cancer Other         Leukemia age 70     Social History     Socioeconomic History    Marital status:      Spouse name: Russell Shone Number of children: 2    Years of education: Not on file    Highest education level: Not on file   Occupational History     Employer: RETIRED   Tobacco Use    Smoking status: Never Smoker    Smokeless tobacco: Never Used   Vaping Use    Vaping Use: Never used   Substance and Sexual Activity    Alcohol use: Yes     Comment: very rare occasions    Drug use: No    Sexual activity: Yes     Partners: Male   Other Topics Concern    Not on file   Social History Narrative    Not on file     Social Determinants of Health     Financial Resource Strain: Low Risk     Difficulty of Paying Living Expenses: Not hard at all   Food Insecurity: No Food Insecurity    Worried About Running Out of Food in the Last Year: Never true    [address] in the Last Year: Never true   Transportation Needs: No Transportation Needs    Lack of Transportation (Medical): No    Lack of Transportation (Non-Medical): No   Physical Activity: Inactive    Days of Exercise per Week: 0 days    Minutes of Exercise per Session: 0 min   Stress: No Stress Concern Present    Feeling of Stress : Not at all   Social Connections: Moderately Integrated    Frequency of Communication with Friends and Family:  Three times a week    Frequency of Social Gatherings with Friends and Family: Once a week    Attends Gnosticism Services: More than 4 times per year    Active Member of [address] [place] [address] [employer] or Organizations: No    Attends Club or Organization Meetings: Never    Marital Status:    Intimate Partner Violence:     Fear of Current or Ex-Partner: Not on file    Emotionally Abused: Not on file    Physically Abused: Not on file    Sexually Abused: Not on file   Housing Stability: [address] Unable to Pay for Housing in the Last Year: No    [facility] of [facility] in [facility]: [facility]    Unstable Housing in the Last Year: No     Current Outpatient Medications   Medication Sig Dispense Refill    DULoxetine (CYMBALTA) 60 MG extended release capsule TAKE 1 CAPSULE DAILY 90 capsule 1    carbidopa-levodopa (SINEMET)  MG per tablet Take 2.5 tablets by mouth 3 times daily      pantoprazole (PROTONIX) 40 MG tablet Take 1 tablet by mouth daily 90 tablet 3    pravastatin (PRAVACHOL) 20 MG tablet TAKE 1 TABLET DAILY 90 tablet 3    vitamin D (ERGOCALCIFEROL) 1.25 MG (40590 UT) CAPS capsule TAKE ONE CAPSULE BY MOUTH ONCE WEEKLY 4 capsule 5    benazepril (LOTENSIN) 40 MG tablet Take 1 tablet by mouth daily 90 tablet 0    furosemide (LASIX) 20 MG tablet TAKE ONE TABLET BY MOUTH EVERY MORNING 30 tablet 3    metoprolol tartrate (LOPRESSOR) 25 MG tablet Take 1 tablet by mouth every evening 30 tablet 2    fluticasone (FLONASE) 50 MCG/ACT nasal spray USE 2 SPRAYS NASALLY DAILY 48 g 3    vitamin B-6 (PYRIDOXINE) 100 MG tablet Take 100 mg by mouth daily      vitamin E 400 UNIT capsule Take 400 Units by mouth daily       No current facility-administered medications for this visit. Allergies   Allergen Reactions    Lodine [Etodolac] Rash       Review of Systems   Constitutional: Positive for fatigue. HENT: Negative. Eyes: Negative. Respiratory: Negative. Cardiovascular: Negative. Gastrointestinal: Negative. Neurological: Negative for dizziness, facial asymmetry and headaches. All other systems reviewed and are negative. OBJECTIVE:  BP (!) 148/70   Pulse 105   Temp 98.2 °F (36.8 °C) (Temporal)   Resp 14   Wt 206 lb 3.2 oz (93.5 kg)   SpO2 97%   BMI 34.31 kg/m²     Physical Exam  Vitals reviewed. Eyes:      General: No scleral icterus. Conjunctiva/sclera: Conjunctivae normal.   Neck:      Thyroid: No thyromegaly. Vascular: No JVD. Cardiovascular:      Rate and Rhythm: Normal rate and regular rhythm. Heart sounds: Normal heart sounds. No murmur heard. No friction rub. No gallop. Pulmonary:      Effort: Pulmonary effort is normal.      Breath sounds: Normal breath sounds. No wheezing or rales. Abdominal:      General: Bowel sounds are normal. There is no distension. Palpations: Abdomen is soft. There is no mass. Tenderness: There is no abdominal tenderness. Hernia: No hernia is present. Lymphadenopathy:      Cervical: No cervical adenopathy. Skin:     Findings: No rash. Neurological:      Mental Status: She is alert and oriented to person, place, and time. ASSESSMENT:   Diagnosis Orders   1.  Essential hypertension  Basic Metabolic Panel    CBC    TSH without Reflex    Hepatic Function Panel    Vitamin B12 & Folate    Culture, Urine   2. Tremor     3. Mixed hyperlipidemia     4. Gastroesophageal reflux disease, unspecified whether esophagitis present     5. Fatigue, unspecified type  Basic Metabolic Panel    CBC    TSH without Reflex    Hepatic Function Panel    Vitamin B12 & Folate    POCT Urinalysis no Micro    Culture, Urine   6.  Major depressive disorder, recurrent, mild           PLAN:  See orders  Continue same medication  Printed a list for the patient and her  to follow at home  Off the HCTZ she is on Lasix already

## 2022-02-16 LAB — URINE CULTURE, ROUTINE: NORMAL

## 2022-03-01 RX ORDER — DULOXETIN HYDROCHLORIDE 60 MG/1
CAPSULE, DELAYED RELEASE ORAL
Qty: 90 CAPSULE | Refills: 3 | OUTPATIENT
Start: 2022-03-01

## 2022-03-01 NOTE — TELEPHONE ENCOUNTER
Medication:   Requested Prescriptions     Pending Prescriptions Disp Refills    DULoxetine (CYMBALTA) 60 MG extended release capsule [Pharmacy Med Name: DULOXETINE HCL DR CAPS 60MG] 90 capsule 3     Sig: TAKE 1 CAPSULE DAILY     Last Filled:  2/15/22    Last appt: 2/15/2022   Next appt: Visit date not found

## 2022-04-01 RX ORDER — FLUTICASONE PROPIONATE 50 MCG
SPRAY, SUSPENSION (ML) NASAL
Qty: 48 G | Refills: 3 | Status: SHIPPED | OUTPATIENT
Start: 2022-04-01

## 2022-04-01 NOTE — TELEPHONE ENCOUNTER
Medication:   Requested Prescriptions     Pending Prescriptions Disp Refills    fluticasone (FLONASE) 50 MCG/ACT nasal spray [Pharmacy Med Name: FLUTICASONE PROP NASAL SPRAY 16GM 50MCG] 48 g 3     Sig: USE 2 SPRAYS NASALLY DAILY     Last Filled: 2.11.21    Last appt: 2/15/2022   Next appt: Visit date not found    Last OARRS:   RX Monitoring 6/3/2019   Attestation The Prescription Monitoring Report for this patient was reviewed today.    Periodic Controlled Substance Monitoring -

## 2022-04-18 DIAGNOSIS — I10 ESSENTIAL HYPERTENSION: ICD-10-CM

## 2022-04-18 RX ORDER — ERGOCALCIFEROL 1.25 MG/1
CAPSULE ORAL
Qty: 12 CAPSULE | Refills: 1 | Status: SHIPPED | OUTPATIENT
Start: 2022-04-18 | End: 2022-10-31

## 2022-04-19 RX ORDER — FUROSEMIDE 20 MG/1
TABLET ORAL
Qty: 90 TABLET | Refills: 1 | Status: SHIPPED | OUTPATIENT
Start: 2022-04-19 | End: 2022-10-20

## 2022-04-19 NOTE — TELEPHONE ENCOUNTER
Medication:   Requested Prescriptions     Pending Prescriptions Disp Refills    furosemide (LASIX) 20 MG tablet [Pharmacy Med Name: FUROSEMIDE 20 MG TABLET] 90 tablet      Sig: TAKE ONE TABLET BY MOUTH EVERY MORNING     Last Filled: 1.28.22    Last appt: 2/15/2022   Next appt: Visit date not found    Last OARRS:   RX Monitoring 6/3/2019   Attestation The Prescription Monitoring Report for this patient was reviewed today.    Periodic Controlled Substance Monitoring -

## 2022-05-04 ENCOUNTER — OFFICE VISIT (OUTPATIENT)
Dept: PRIMARY CARE CLINIC | Age: 87
End: 2022-05-04
Payer: MEDICARE

## 2022-05-04 ENCOUNTER — TELEPHONE (OUTPATIENT)
Dept: PRIMARY CARE CLINIC | Age: 87
End: 2022-05-04

## 2022-05-04 VITALS
HEIGHT: 65 IN | SYSTOLIC BLOOD PRESSURE: 120 MMHG | HEART RATE: 98 BPM | TEMPERATURE: 97.1 F | BODY MASS INDEX: 34.32 KG/M2 | OXYGEN SATURATION: 99 % | WEIGHT: 206 LBS | DIASTOLIC BLOOD PRESSURE: 76 MMHG

## 2022-05-04 DIAGNOSIS — M79.89 LEG SWELLING: Primary | ICD-10-CM

## 2022-05-04 DIAGNOSIS — Z91.81 AT HIGH RISK FOR FALLS: ICD-10-CM

## 2022-05-04 PROCEDURE — 99214 OFFICE O/P EST MOD 30 MIN: CPT | Performed by: STUDENT IN AN ORGANIZED HEALTH CARE EDUCATION/TRAINING PROGRAM

## 2022-05-04 RX ORDER — NAPROXEN 500 MG/1
500 TABLET ORAL 2 TIMES DAILY PRN
Qty: 30 TABLET | Refills: 0 | Status: SHIPPED | OUTPATIENT
Start: 2022-05-04 | End: 2022-05-31 | Stop reason: SDUPTHER

## 2022-05-04 NOTE — PROGRESS NOTES
Claudette Saber (:  1934) is a 80 y.o. female,Established patient, here for evaluation of the following chief complaint(s):  Leg Swelling (Bilater pain and neuropathy)         ASSESSMENT/PLAN:  1. Leg swelling  - acutely worsening   Concern for DVT given the 2 inch difference between the circumference of calf   Also will screen for heart strain   Check kidney function as well   Continue with Lasix for right now, may need to increase the dose   She can try naproxen for leg pain  -     VL DUP LOWER EXTREMITY VENOUS BILATERAL; Future  Discussed her neuropathy pain recommended that she go back on Cymbalta, would not recommend starting gabapentin or Lyrica since she already is prone to falls. 2. At high risk for falls  Continue with physical therapy  Already taking precautions  She will need to follow-up with her PCP  Already seeing specialist for this. Return if symptoms worsen or fail to improve. Subjective   SUBJECTIVE/OBJECTIVE:  HPI    Has severe neuropathy and frequent falls, seeing PT since last week. Leg pain chronic wanting medications. Stopped duloxetine a month ago because she didn't think it was working. Has not noticed any difference in her pain. Denies any shortness of breath, chest pain. States she is already seeing someone about her neuropathy. Review of Systems   All other systems reviewed and are negative. Objective   Physical Exam  Constitutional:       Appearance: Normal appearance. HENT:      Head: Normocephalic and atraumatic. Nose: Nose normal.      Mouth/Throat:      Mouth: Mucous membranes are moist.   Eyes:      Extraocular Movements: Extraocular movements intact. Cardiovascular:      Rate and Rhythm: Normal rate and regular rhythm. Heart sounds: Normal heart sounds. No murmur heard. No friction rub. No gallop. Pulmonary:      Effort: Pulmonary effort is normal.      Breath sounds: Normal breath sounds. No wheezing, rhonchi or rales. Musculoskeletal:      Comments: 2+ pitting edema to mid shin bilaterally  Right calf circumference 16.5 inches compared to left calf at 14.5 inches, no tenderness, does have some mild erythema to mid shin bilaterally   Skin:     General: Skin is warm. Neurological:      General: No focal deficit present. Mental Status: She is alert. Psychiatric:         Mood and Affect: Mood normal.                  An electronic signature was used to authenticate this note. --Liliana Costello MD   On the basis of positive falls risk screening, assessment and plan is as follows: home safety tips provided.

## 2022-05-09 ENCOUNTER — OFFICE VISIT (OUTPATIENT)
Dept: PULMONOLOGY | Age: 87
End: 2022-05-09
Payer: MEDICARE

## 2022-05-09 VITALS
SYSTOLIC BLOOD PRESSURE: 110 MMHG | BODY MASS INDEX: 34.32 KG/M2 | HEIGHT: 65 IN | OXYGEN SATURATION: 96 % | TEMPERATURE: 98.3 F | WEIGHT: 206 LBS | HEART RATE: 98 BPM | DIASTOLIC BLOOD PRESSURE: 68 MMHG

## 2022-05-09 DIAGNOSIS — G47.33 OBSTRUCTIVE SLEEP APNEA: Primary | Chronic | ICD-10-CM

## 2022-05-09 DIAGNOSIS — I10 ESSENTIAL HYPERTENSION: Chronic | ICD-10-CM

## 2022-05-09 DIAGNOSIS — K21.9 GASTROESOPHAGEAL REFLUX DISEASE, UNSPECIFIED WHETHER ESOPHAGITIS PRESENT: Chronic | ICD-10-CM

## 2022-05-09 DIAGNOSIS — E66.9 NON MORBID OBESITY, UNSPECIFIED OBESITY TYPE: Chronic | ICD-10-CM

## 2022-05-09 PROCEDURE — 99214 OFFICE O/P EST MOD 30 MIN: CPT | Performed by: INTERNAL MEDICINE

## 2022-05-09 RX ORDER — MODAFINIL 200 MG/1
200 TABLET ORAL EVERY MORNING
Qty: 90 TABLET | Refills: 0 | Status: SHIPPED | OUTPATIENT
Start: 2022-05-09 | End: 2022-08-07

## 2022-05-09 ASSESSMENT — SLEEP AND FATIGUE QUESTIONNAIRES
HOW LIKELY ARE YOU TO NOD OFF OR FALL ASLEEP WHILE SITTING INACTIVE IN A PUBLIC PLACE: 0
ESS TOTAL SCORE: 5
HOW LIKELY ARE YOU TO NOD OFF OR FALL ASLEEP WHEN YOU ARE A PASSENGER IN A CAR FOR AN HOUR WITHOUT A BREAK: 2
HOW LIKELY ARE YOU TO NOD OFF OR FALL ASLEEP WHILE SITTING QUIETLY AFTER LUNCH WITHOUT ALCOHOL: 0
HOW LIKELY ARE YOU TO NOD OFF OR FALL ASLEEP WHILE WATCHING TV: 1
HOW LIKELY ARE YOU TO NOD OFF OR FALL ASLEEP IN A CAR, WHILE STOPPED FOR A FEW MINUTES IN TRAFFIC: 0
HOW LIKELY ARE YOU TO NOD OFF OR FALL ASLEEP WHILE SITTING AND READING: 0
HOW LIKELY ARE YOU TO NOD OFF OR FALL ASLEEP WHILE LYING DOWN TO REST IN THE AFTERNOON WHEN CIRCUMSTANCES PERMIT: 2
HOW LIKELY ARE YOU TO NOD OFF OR FALL ASLEEP WHILE SITTING AND TALKING TO SOMEONE: 0

## 2022-05-09 NOTE — PROGRESS NOTES
Lona Hawk [facility]  [address] [facility], [address] [facility]- [phone]   [facility] Joel Delcid. [address] [place] [address]. Jina Rios 37 [phone] [address] [facility]  [address] [place] [id number] [phone]      Assessment/Plan:      1. Obstructive sleep apnea  Assessment & Plan:   Chronic-Stable: Reviewed and analyzed results of physiologic download from patient's machine and reviewed with patient. Supplies and parts as needed for her machine. These are medically necessary. Limit caffeine use after 3pm. Based on the analyzed data will continue with current settings. Now that patient is compliant of APAP can refill provigil for patient to use PRN at 200 mg, especially when driving. Was stable on that dose and was gaining benefit from use. PDMP report reviewed today. Drug contract signed today. Shown how to setup her replacement machine. Orders:  -     modafinil (PROVIGIL) 200 MG tablet; Take 1 tablet by mouth every morning for 90 days. , Disp-90 tablet, R-0Normal  2. Essential hypertension  Assessment & Plan:  Chronic- Stable. Discussed the importance of treating sleep apnea as part of the management of this disorder. Cont any meds per PCP and other physicians. 3. Gastroesophageal reflux disease, unspecified whether esophagitis present  Assessment & Plan:  Chronic- Stable. Discussed the importance of treating sleep apnea as part of the management of this disorder. Cont any meds per PCP and other physicians. 4. Non morbid obesity, unspecified obesity type  Assessment & Plan:  Chronic-not stable:  Discussed importance of treating obstructive sleep apnea and getting sufficient sleep to assist with weight control. Encouraged her to work on weight loss through diet and exercise. Recommended DASH or Mediterranean diets.        Reviewed, analyzed, and documented physiologic data from patient's PAP machine. This information was analyzed to assess complexity and medical decision making in regards to further testing and management. The primary encounter diagnosis was Obstructive sleep apnea. Diagnoses of Essential hypertension, Gastroesophageal reflux disease, unspecified whether esophagitis present, and Non morbid obesity, unspecified obesity type were also pertinent to this visit. The chronic medical conditions listed are directly related to the primary diagnosis listed above. The management of the primary diagnosis affects the secondary diagnosis and vice versa. Subjective:   Subjective   Patient ID: Iban Dasilva is a 80 y.o. female. Chief Complaint   Patient presents with    Sleep Apnea       HPI:  Machine Modem/Download Info:  Compliance (hours/night): 6.5 hrs/night  % of nights >= 4 hrs: 87.8 %  Download AHI (/hour): 6.7 /HR  Average CPAP Pressure : 15.9 cmH2O APAP - Settings  Pressure Min: 12 cmH2O  Pressure Max: 20 cmH2O                 Comfort Settings  Humidity Level (0-8): 6  Flex/EPR (0-3): 3       Went back to using her machine. Sleeping better and more rested but still drowsy during they day, especially when driving. Pressure feels good, not having SOB nor aerophagia. Got her replacement machine but has not turned it on yet. Asking for refill on provigil to use when driving.     DME Company - [employer]    [place] - Total score: 5    Social History     Socioeconomic History    Marital status:      Spouse name: Britney Gomez Number of children: 2    Years of education: Not on file    Highest education level: Not on file   Occupational History     Employer: RETIRED   Tobacco Use    Smoking status: Never Smoker    Smokeless tobacco: Never Used   Vaping Use    Vaping Use: Never used   Substance and Sexual Activity    Alcohol use: Yes     Comment: very rare occasions    Drug use: No    Sexual activity: Yes     Partners: Male   Other Topics Concern    Not on file   Social History Narrative    Not on file     Social Determinants of Health     Financial Resource Strain: Low Risk     Difficulty of Paying Living Expenses: Not hard at all   Food Insecurity: No Food Insecurity    Worried About Running Out of Food in the Last Year: Never true    [address] in the Last Year: Never true   Transportation Needs: No Transportation Needs    Lack of Transportation (Medical): No    Lack of Transportation (Non-Medical): No   Physical Activity: Inactive    Days of Exercise per Week: 0 days    Minutes of Exercise per Session: 0 min   Stress: No Stress Concern Present    Feeling of Stress : Not at all   Social Connections: Moderately Integrated    Frequency of Communication with Friends and Family:  Three times a week    Frequency of Social Gatherings with Friends and Family: Once a week    Attends Restorationist Services: More than 4 times per year    Active Member of [address] [place] [address] or Organizations: No    Attends Club or Organization Meetings: Never    Marital Status:    Intimate Partner Violence:     Fear of Current or Ex-Partner: Not on file    Emotionally Abused: Not on file    Physically Abused: Not on file    Sexually Abused: Not on file   Housing Stability: [address] Unable to Pay for Housing in the Last Year: No    Number of Places Lived in the Last Year: 1    Unstable Housing in the Last Year: No       Current Outpatient Medications   Medication Instructions    benazepril (LOTENSIN) 40 mg, Oral, DAILY    carbidopa-levodopa (SINEMET)  MG per tablet 2.5 tablets, Oral, 3 TIMES DAILY    fluticasone (FLONASE) 50 MCG/ACT nasal spray USE 2 SPRAYS NASALLY DAILY    furosemide (LASIX) 20 MG tablet TAKE ONE TABLET BY MOUTH EVERY MORNING    metoprolol tartrate (LOPRESSOR) 25 mg, Oral, EVERY EVENING    modafinil (PROVIGIL) 200 mg, Oral, EVERY MORNING    naproxen (NAPROSYN) 500 mg, Oral, 2 TIMES DAILY PRN    pantoprazole (PROTONIX) 40 mg, Oral, DAILY    pravastatin (PRAVACHOL) 20 MG tablet TAKE 1 TABLET DAILY    vitamin B-6 (PYRIDOXINE) 100 mg, Oral, DAILY    vitamin D (ERGOCALCIFEROL) 1.25 MG (63273 UT) CAPS capsule TAKE ONE CAPSULE BY MOUTH ONCE WEEKLY    vitamin E 400 Units, Oral, DAILY          Vitals:  Weight BMI   Wt Readings from Last 3 Encounters:   05/09/22 206 lb (93.4 kg)   05/04/22 206 lb (93.4 kg)   02/15/22 206 lb 3.2 oz (93.5 kg)    Body mass index is 34.28 kg/m².      BP HR SaO2   BP Readings from Last 3 Encounters:   05/09/22 110/68   05/04/22 120/76   02/15/22 125/70    Pulse Readings from Last 3 Encounters:   05/09/22 98   05/04/22 98   02/15/22 105    SpO2 Readings from Last 3 Encounters:   05/09/22 96%   05/04/22 99%   02/15/22 97%        Electronically signed by Shelly Ugarte MD on 5/9/2022 at 2:45 PM

## 2022-05-09 NOTE — ASSESSMENT & PLAN NOTE
Chronic-Stable: Reviewed and analyzed results of physiologic download from patient's machine and reviewed with patient. Supplies and parts as needed for her machine. These are medically necessary. Limit caffeine use after 3pm. Based on the analyzed data will continue with current settings. Now that patient is compliant of APAP can refill provigil for patient to use PRN at 200 mg, especially when driving. Was stable on that dose and was gaining benefit from use. PDMP report reviewed today. Drug contract signed today. Shown how to setup her replacement machine.

## 2022-05-11 NOTE — TELEPHONE ENCOUNTER
Pt can't make an appointment at this time due to time constraints and other appointments. Legs aren't swelling as much. She will call back to reschedule appt.

## 2022-05-20 ENCOUNTER — TELEPHONE (OUTPATIENT)
Dept: PRIMARY CARE CLINIC | Age: 87
End: 2022-05-20

## 2022-05-20 NOTE — TELEPHONE ENCOUNTER
----- Message from [facility] sent at 5/20/2022 12:12 PM EDT -----  Subject: Medication Problem    QUESTIONS  Name of Medication? naproxen (NAPROSYN) 500 MG tablet  Patient-reported dosage and instructions? 500 mg, take one tablet twice a   day as needed for pain  What question or problem do you have with the medication? Patient states,   on 5/19, she was in ED for back muscle spasms; received a prescription for   Cyclobenzaprine 10 mg, take one by mouth 3 times a day as needed for   muscle spasms; can she take Naproxen with Cyclobenzaprine? Preferred Pharmacy? [facility] [id number] - [place], [address] [facility]  Pharmacy phone number (if available)? [phone]  Additional Information for Provider?   ---------------------------------------------------------------------------  --------------  [address]  What is the best way for the office to contact you? OK to leave message on   voicemail  Preferred Call Back Phone Number? [phone]  ---------------------------------------------------------------------------  --------------  SCRIPT ANSWERS  Relationship to Patient?  Self

## 2022-05-20 NOTE — TELEPHONE ENCOUNTER
Yes she can, should be careful taking naproxen and full stomach  Flexeril also can make her sleepy or drowsy

## 2022-05-24 RX ORDER — TIZANIDINE 2 MG/1
2 TABLET ORAL NIGHTLY PRN
Qty: 30 TABLET | Refills: 0 | Status: SHIPPED | OUTPATIENT
Start: 2022-05-24 | End: 2022-06-13

## 2022-05-24 NOTE — TELEPHONE ENCOUNTER
Patient needs a refill on the following medication     Cyclobenzaprina 10mg   She was given a script when she went to the hospital muscle spasms in her lower left back. Patient isn't sure who she needs to go to get a refill ?    Thank you

## 2022-05-24 NOTE — TELEPHONE ENCOUNTER
Called  Patient  She  Made  appt  For Thursday  Wants to know  If  She can  Have  Flexeril until she  comies in

## 2022-05-26 ENCOUNTER — OFFICE VISIT (OUTPATIENT)
Dept: PRIMARY CARE CLINIC | Age: 87
End: 2022-05-26
Payer: MEDICARE

## 2022-05-26 VITALS
HEIGHT: 65 IN | DIASTOLIC BLOOD PRESSURE: 80 MMHG | HEART RATE: 78 BPM | TEMPERATURE: 98.3 F | OXYGEN SATURATION: 98 % | WEIGHT: 208 LBS | RESPIRATION RATE: 14 BRPM | SYSTOLIC BLOOD PRESSURE: 122 MMHG | BODY MASS INDEX: 34.66 KG/M2

## 2022-05-26 DIAGNOSIS — M48.061 SPINAL STENOSIS OF LUMBAR REGION, UNSPECIFIED WHETHER NEUROGENIC CLAUDICATION PRESENT: ICD-10-CM

## 2022-05-26 DIAGNOSIS — E78.2 MIXED HYPERLIPIDEMIA: ICD-10-CM

## 2022-05-26 DIAGNOSIS — G89.29 CHRONIC BILATERAL LOW BACK PAIN WITHOUT SCIATICA: Primary | ICD-10-CM

## 2022-05-26 DIAGNOSIS — M54.50 CHRONIC BILATERAL LOW BACK PAIN WITHOUT SCIATICA: Primary | ICD-10-CM

## 2022-05-26 DIAGNOSIS — I10 ESSENTIAL HYPERTENSION: ICD-10-CM

## 2022-05-26 PROCEDURE — 1123F ACP DISCUSS/DSCN MKR DOCD: CPT | Performed by: FAMILY MEDICINE

## 2022-05-26 PROCEDURE — 99214 OFFICE O/P EST MOD 30 MIN: CPT | Performed by: FAMILY MEDICINE

## 2022-05-26 ASSESSMENT — ANXIETY QUESTIONNAIRES
3. WORRYING TOO MUCH ABOUT DIFFERENT THINGS: 0
5. BEING SO RESTLESS THAT IT IS HARD TO SIT STILL: 0
GAD7 TOTAL SCORE: 0
7. FEELING AFRAID AS IF SOMETHING AWFUL MIGHT HAPPEN: 0
6. BECOMING EASILY ANNOYED OR IRRITABLE: 0
IF YOU CHECKED OFF ANY PROBLEMS ON THIS QUESTIONNAIRE, HOW DIFFICULT HAVE THESE PROBLEMS MADE IT FOR YOU TO DO YOUR WORK, TAKE CARE OF THINGS AT HOME, OR GET ALONG WITH OTHER PEOPLE: NOT DIFFICULT AT ALL
1. FEELING NERVOUS, ANXIOUS, OR ON EDGE: 0
2. NOT BEING ABLE TO STOP OR CONTROL WORRYING: 0
4. TROUBLE RELAXING: 0

## 2022-05-26 ASSESSMENT — PATIENT HEALTH QUESTIONNAIRE - PHQ9
5. POOR APPETITE OR OVEREATING: 0
1. LITTLE INTEREST OR PLEASURE IN DOING THINGS: 0
8. MOVING OR SPEAKING SO SLOWLY THAT OTHER PEOPLE COULD HAVE NOTICED. OR THE OPPOSITE, BEING SO FIGETY OR RESTLESS THAT YOU HAVE BEEN MOVING AROUND A LOT MORE THAN USUAL: 0
SUM OF ALL RESPONSES TO PHQ QUESTIONS 1-9: 0
3. TROUBLE FALLING OR STAYING ASLEEP: 0
6. FEELING BAD ABOUT YOURSELF - OR THAT YOU ARE A FAILURE OR HAVE LET YOURSELF OR YOUR FAMILY DOWN: 0
7. TROUBLE CONCENTRATING ON THINGS, SUCH AS READING THE NEWSPAPER OR WATCHING TELEVISION: 0
2. FEELING DOWN, DEPRESSED OR HOPELESS: 0
SUM OF ALL RESPONSES TO PHQ QUESTIONS 1-9: 0
10. IF YOU CHECKED OFF ANY PROBLEMS, HOW DIFFICULT HAVE THESE PROBLEMS MADE IT FOR YOU TO DO YOUR WORK, TAKE CARE OF THINGS AT HOME, OR GET ALONG WITH OTHER PEOPLE: 0
4. FEELING TIRED OR HAVING LITTLE ENERGY: 0
9. THOUGHTS THAT YOU WOULD BE BETTER OFF DEAD, OR OF HURTING YOURSELF: 0
SUM OF ALL RESPONSES TO PHQ QUESTIONS 1-9: 0
SUM OF ALL RESPONSES TO PHQ QUESTIONS 1-9: 0
SUM OF ALL RESPONSES TO PHQ9 QUESTIONS 1 & 2: 0

## 2022-05-26 NOTE — PROGRESS NOTES
SUBJECTIVE:  Patient ID: Jesenia Stauffer is a 80 y.o. y.o. female     HPI   Patient presented today with her  for follow-up on chronic back pain she did not to emergency room because of acute onset of spasm as she described it she was hardly able to walk,  Patient with a history of chronic back pain spinal stenosis neuropathy hypertension hyperlipidemia  She feels better today, she has been taking muscle relaxant she took all the Flexeril I sent a different 1 due to the side effect of Flexeril and elderly people, she feels that heating pad is helping her  She is to see an orthopedist in the past and had epidural not interested now she is going through physical therapy  Denies any recent trauma or injury or any trigger of the pain  No fever no chills no other symptoms, denies any urine symptoms  With hypertension stable on current medication,  Patient with hyperlipidemia stable on current medication  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    Carcinoma nos     Chronic back pain     GERD (gastroesophageal reflux disease)     Hypercholesterolemia     Hyperlipidemia     Hypertension     Major depressive disorder, recurrent, 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    Menopause age unsure    Non morbid obesity, unspecified obesity type 12/4/2018    Obstructive sleep apnea 8/5/2014    Pneumonia     Pneumonia     Postmenopausal hormone replacement therapy     Rectocele 1970s    Seasonal allergies     Sleep apnea 2010    Spinal stenosis of lumbar region     Gets epidural injections every 6 months      Past Surgical History:   Procedure Laterality Date    BREAST BIOPSY Right 5/3/13    biopsy    BREAST LUMPECTOMY  9/27/05    right lumpectomy   [address]  1/08    bilateral    COLONOSCOPY  2005    30, R7X 7X 2005   [facility] AND CURETTAGE OF UTERUS  yrs ago    DOPPLER ECHOCARDIOGRAPHY  2007    EYE SURGERY      bilat catarats    HYSTERECTOMY  4/1974    JOINT REPLACEMENT      left hip    KNEE SURGERY  1991    L    KNEE SURGERY  1993    R    MOHS SURGERY  2/08    nose    OTHER SURGICAL HISTORY  12/22/06    epidural    RECTOCELE REPAIR  1989    SUTURE REMOVAL  june 2004    sutures in 3 fingers    TONSILLECTOMY      TOTAL HIP ARTHROPLASTY  2006    L     Family History   Problem Relation Age of Onset    Cancer Brother         Prostate    Cancer Mother         Stomach Cancer at age 80    Stroke Father     Cancer Paternal Grandmother         intraadbominal at age 80    Cancer Other         Leukemia age 70     Social History     Socioeconomic History    Marital status:      Spouse name: Rosanne Lucero Number of children: 2    Years of education: None    Highest education level: None   Occupational History     Employer: RETIRED   Tobacco Use    Smoking status: Never Smoker    Smokeless tobacco: Never Used   Vaping Use    Vaping Use: Never used   Substance and Sexual Activity    Alcohol use: Yes     Comment: very rare occasions    Drug use: No    Sexual activity: Yes     Partners: Male   Other Topics Concern    None   Social History Narrative    None     Social Determinants of Health     Financial Resource Strain: Low Risk     Difficulty of Paying Living Expenses: Not hard at all   Food Insecurity: No Food Insecurity    Worried About Running Out of Food in the Last Year: Never true    [place] of Food in the Last Year: Never true   Transportation Needs: No Transportation Needs    Lack of Transportation (Medical): No    Lack of Transportation (Non-Medical): No   Physical Activity: Inactive    Days of Exercise per Week: 0 days    Minutes of Exercise per Session: 0 min   Stress: No Stress Concern Present    Feeling of Stress : Not at all   Social Connections:  Moderately Integrated    Frequency of Communication with Friends and Family: Three times a week    Frequency of Social Gatherings with Friends and Family: Once a week    Attends [employer] Services: More than 4 times per year    Active Member of [address] [place] [address] or Organizations: No    Attends Club or Organization Meetings: Never    Marital Status:    Intimate Partner Violence:     Fear of Current or Ex-Partner: Not on file    Emotionally Abused: Not on file    Physically Abused: Not on file    Sexually Abused: Not on file   Housing Stability: [address] Unable to Pay for Housing in the Last Year: No    Number of [place] in the Last Year: 1    Unstable Housing in the Last Year: No     Current Outpatient Medications   Medication Sig Dispense Refill    tiZANidine (ZANAFLEX) 2 MG tablet Take 1 tablet by mouth nightly as needed (muscle spasms) 30 tablet 0    modafinil (PROVIGIL) 200 MG tablet Take 1 tablet by mouth every morning for 90 days. 90 tablet 0    naproxen (NAPROSYN) 500 MG tablet Take 1 tablet by mouth 2 times daily as needed for Pain 30 tablet 0    furosemide (LASIX) 20 MG tablet TAKE ONE TABLET BY MOUTH EVERY MORNING 90 tablet 1    vitamin D (ERGOCALCIFEROL) 1.25 MG (43615 UT) CAPS capsule TAKE ONE CAPSULE BY MOUTH ONCE WEEKLY 12 capsule 1    fluticasone (FLONASE) 50 MCG/ACT nasal spray USE 2 SPRAYS NASALLY DAILY 48 g 3    carbidopa-levodopa (SINEMET)  MG per tablet Take 2.5 tablets by mouth 3 times daily      pantoprazole (PROTONIX) 40 MG tablet Take 1 tablet by mouth daily 90 tablet 3    pravastatin (PRAVACHOL) 20 MG tablet TAKE 1 TABLET DAILY 90 tablet 3    benazepril (LOTENSIN) 40 MG tablet Take 1 tablet by mouth daily 90 tablet 0    metoprolol tartrate (LOPRESSOR) 25 MG tablet Take 1 tablet by mouth every evening 30 tablet 2    vitamin B-6 (PYRIDOXINE) 100 MG tablet Take 100 mg by mouth daily      vitamin E 400 UNIT capsule Take 400 Units by mouth daily       No current facility-administered medications for this visit. Allergies   Allergen Reactions    Lodine [Etodolac] Rash       Review of Systems   Constitutional: Negative. HENT: Negative. Eyes: Negative. Respiratory: Negative. Cardiovascular: Negative. Gastrointestinal: Negative. Musculoskeletal: Positive for arthralgias and back pain. All other systems reviewed and are negative. OBJECTIVE:  /80   Pulse 78   Temp 98.3 °F (36.8 °C) (Oral)   Resp 14   Ht 5' 5\" (1.651 m)   Wt 208 lb (94.3 kg)   SpO2 98%   BMI 34.61 kg/m²     Physical Exam  Vitals reviewed. Eyes:      General: No scleral icterus. Conjunctiva/sclera: Conjunctivae normal.   Neck:      Thyroid: No thyromegaly. Vascular: No JVD. Cardiovascular:      Rate and Rhythm: Normal rate and regular rhythm. Heart sounds: Normal heart sounds. No murmur heard. No friction rub. No gallop. Pulmonary:      Effort: Pulmonary effort is normal.      Breath sounds: Normal breath sounds. No wheezing or rales. Abdominal:      General: Bowel sounds are normal. There is no distension. Palpations: Abdomen is soft. There is no mass. Tenderness: There is no abdominal tenderness. Hernia: No hernia is present. Musculoskeletal:         General: Tenderness present. Lumbar back: Tenderness present. Lymphadenopathy:      Cervical: No cervical adenopathy. Skin:     Findings: No rash. Neurological:      Mental Status: She is alert and oriented to person, place, and time. ASSESSMENT:     Diagnosis Orders   1. Chronic bilateral low back pain without sciatica     2. Spinal stenosis of lumbar region, unspecified whether neurogenic claudication present     3. Essential hypertension     4.  Mixed hyperlipidemia         PLAN:  Reviewed ER record  She can use naproxen with caution due to side effect discussed at length with the patient and her   Muscle relaxant as needed  Continue PT  Call if not better

## 2022-05-27 ASSESSMENT — ENCOUNTER SYMPTOMS
EYES NEGATIVE: 1
GASTROINTESTINAL NEGATIVE: 1
BACK PAIN: 1
RESPIRATORY NEGATIVE: 1

## 2022-05-31 ENCOUNTER — TELEPHONE (OUTPATIENT)
Dept: PRIMARY CARE CLINIC | Age: 87
End: 2022-05-31

## 2022-05-31 RX ORDER — NAPROXEN 500 MG/1
500 TABLET ORAL 2 TIMES DAILY PRN
Qty: 60 TABLET | Refills: 0 | Status: SHIPPED | OUTPATIENT
Start: 2022-05-31 | End: 2022-10-13

## 2022-05-31 NOTE — TELEPHONE ENCOUNTER
----- Message from Stella Calvillo sent at 5/31/2022 10:13 AM EDT -----  Subject: Refill Request    QUESTIONS  Name of Medication? naproxen (NAPROSYN) 500 MG tablet  Patient-reported dosage and instructions? 1 pill twice a day as needed for   pain  How many days do you have left? 0  Preferred Pharmacy? [facility] [id number]  Pharmacy phone number (if available)? [phone]  Additional Information for Provider? Patient states sometimes she has to   take three. She wants to know if there is something stronger that can be   prescribed  ---------------------------------------------------------------------------  --------------  CALL BACK INFO  What is the best way for the office to contact you? OK to leave message on   voicemail  Preferred Call Back Phone Number? [phone]  ---------------------------------------------------------------------------  --------------  SCRIPT ANSWERS  Relationship to Patient?  Self

## 2022-06-13 RX ORDER — TIZANIDINE 4 MG/1
4 TABLET ORAL NIGHTLY PRN
Qty: 39 TABLET | Refills: 0 | Status: SHIPPED | OUTPATIENT
Start: 2022-06-13 | End: 2022-07-05

## 2022-06-13 NOTE — TELEPHONE ENCOUNTER
Couple things   1. Pt complains of increase muscle spasm in her middle back   2.  Patient wants to know if Dr Shirin Mari would increase her dosage for tiZANidine   (ZANAFLEX) 2 MG tablet      Please review

## 2022-07-05 RX ORDER — TIZANIDINE 4 MG/1
TABLET ORAL
Qty: 39 TABLET | Refills: 0 | Status: SHIPPED | OUTPATIENT
Start: 2022-07-05 | End: 2022-07-07 | Stop reason: SDUPTHER

## 2022-07-05 RX ORDER — PRAVASTATIN SODIUM 20 MG
TABLET ORAL
Qty: 90 TABLET | Refills: 3 | Status: SHIPPED | OUTPATIENT
Start: 2022-07-05 | End: 2022-11-02 | Stop reason: SDUPTHER

## 2022-07-07 ENCOUNTER — TELEPHONE (OUTPATIENT)
Dept: PRIMARY CARE CLINIC | Age: 87
End: 2022-07-07

## 2022-07-07 RX ORDER — TIZANIDINE 4 MG/1
4 TABLET ORAL NIGHTLY PRN
Qty: 40 TABLET | Refills: 0 | Status: SHIPPED | OUTPATIENT
Start: 2022-07-07 | End: 2022-08-04

## 2022-07-07 RX ORDER — TIZANIDINE 4 MG/1
TABLET ORAL
Qty: 39 TABLET | Refills: 0 | Status: CANCELLED | OUTPATIENT
Start: 2022-07-07

## 2022-07-07 NOTE — TELEPHONE ENCOUNTER
Spoke with the pt   Was diagnosed with COVID this AM   Pt with cough, bodyache, no fever  O 2 sat at home 95  Med's sent to her pharamcy

## 2022-07-07 NOTE — TELEPHONE ENCOUNTER
Pt needs refill on her zanaflex 4mg she has been taking it twice a day it when it was supposed to be taken once a day  rx told her it was to early     Please send to [facility] [phone]

## 2022-07-07 NOTE — TELEPHONE ENCOUNTER
Patient tested POSITIVE for COVID via home test    Patient is an 81 y/o female, not sure how to use smart phones or computers to do a VV.     Patient has a cough, runny nose    Pt has hx- htn, breast ca,    Please advise patients next steps

## 2022-07-07 NOTE — TELEPHONE ENCOUNTER
[phone] (home)   Spoke w/pt does not have pulse oximetry  Pt will have  p/u pulse oximetry and call with readings

## 2022-07-08 ENCOUNTER — TELEPHONE (OUTPATIENT)
Dept: PRIMARY CARE CLINIC | Age: 87
End: 2022-07-08

## 2022-07-08 NOTE — TELEPHONE ENCOUNTER
Per Dr Donna Mtz ok to refill early, pt is taking 1 tablet BID  Spoke w/CJ @ [facility]

## 2022-07-08 NOTE — TELEPHONE ENCOUNTER
Pharmacy called,     Wants to know if OK to fill script early d/t patient having been taking increased dosage.     tiZANidine (ZANAFLEX) 4 MG tablet     Please advise pharmacy  [phone]

## 2022-08-04 DIAGNOSIS — I10 ESSENTIAL HYPERTENSION: ICD-10-CM

## 2022-08-04 RX ORDER — TIZANIDINE 4 MG/1
TABLET ORAL
Qty: 40 TABLET | Refills: 0 | Status: SHIPPED | OUTPATIENT
Start: 2022-08-04 | End: 2022-08-08

## 2022-08-04 RX ORDER — BENAZEPRIL HYDROCHLORIDE 40 MG/1
TABLET, FILM COATED ORAL
Qty: 90 TABLET | Refills: 0 | Status: SHIPPED
Start: 2022-08-04 | End: 2022-10-20 | Stop reason: SINTOL

## 2022-08-04 NOTE — TELEPHONE ENCOUNTER
Results   Component Value Date/Time     02/15/2022 02:33 PM    K 3.6 02/15/2022 02:33 PM    CL 98 02/15/2022 02:33 PM    CO2 27 02/15/2022 02:33 PM    GLUCOSE 143 02/15/2022 02:33 PM    GLUCOSE 97 05/03/2012 03:49 PM    BUN 18 02/15/2022 02:33 PM    CREATININE 0.9 02/15/2022 02:33 PM    PHOS 3.9 08/04/2021 01:45 PM    LABALBU 4.3 02/15/2022 02:33 PM    LABALBU 3.8 07/20/2010 12:00 AM    CALCIUM 9.9 02/15/2022 02:33 PM    GFR 74 10/19/2020 06:33 AM    GFR 64 10/19/2020 06:33 AM    GFRAA >60 02/15/2022 02:33 PM    GFRAA >60 06/11/2013 02:18 AM       Last OARRS:   RX Monitoring 6/3/2019   Attestation The Prescription Monitoring Report for this patient was reviewed today.    Periodic Controlled Substance Monitoring -       Preferred Pharmacy:   [address], [address] [place] [address] ([facility])  [address] [address]  Phone: [phone] Fax: [phone] [address] [phone] - F [phone]  [address] [place] [address]  Phone: [phone] Fax: [phone]  Medication:   Requested Prescriptions     Pending Prescriptions Disp Refills    benazepril (LOTENSIN) 40 MG tablet [Pharmacy Med Name: BENAZEPRIL HCL 40 MG TABLET] 90 tablet 0     Sig: TAKE ONE TABLET BY MOUTH DAILY    tiZANidine (Jaki Awe) 4 MG tablet [Pharmacy Med Name: tiZANidine HCL 4MG TABLET] 40 tablet 0     Sig: TAKE ONE TABLET BY MOUTH TWICE A DAY AS NEEDED FOR MUSCLE SPASMS OR PAIN       Last Filled:      Patient Phone Number: [phone] (home)     Last appt: 5/26/2022   Next appt: Visit date not found    Last BMP:   Lab Results   Component Value Date/Time     02/15/2022 02:33 PM    K 3.6 02/15/2022 02:33 PM    CL 98 02/15/2022 02:33 PM    CO2 27 02/15/2022 02:33 PM    ANIONGAP 16 02/15/2022 02:33 PM    GLUCOSE 143 02/15/2022 02:33 PM    GLUCOSE 97 05/03/2012 03:49 PM    BUN 18 02/15/2022 02:33 PM    CREATININE 0.9 02/15/2022 02:33 PM    LABGLOM 59 02/15/2022 02:33 PM    LABGLOM 66 05/03/2012 03:49 PM    LABGLOM 54 05/03/2012 03:49 PM    GFRAA >60 02/15/2022 02:33 PM    GFRAA >60 06/11/2013 02:18 AM    CALCIUM 9.9 02/15/2022 02:33 PM      Last CMP:   Lab Results   Component Value Date/Time     02/15/2022 02:33 PM    K 3.6 02/15/2022 02:33 PM    CL 98 02/15/2022 02:33 PM    CO2 27 02/15/2022 02:33 PM    ANIONGAP 16 02/15/2022 02:33 PM    GLUCOSE 143 02/15/2022 02:33 PM    GLUCOSE 97 05/03/2012 03:49 PM    BUN 18 02/15/2022 02:33 PM    CREATININE 0.9 02/15/2022 02:33 PM    LABGLOM 59 02/15/2022 02:33 PM    LABGLOM 66 05/03/2012 03:49 PM    LABGLOM 54 05/03/2012 03:49 PM    GFRAA >60 02/15/2022 02:33 PM    GFRAA >60 06/11/2013 02:18 AM    PROT 6.5 02/15/2022 02:33 PM    PROT 6.9 12/04/2012 11:48 AM    LABALBU 4.3 02/15/2022 02:33 PM    LABALBU 3.8 07/20/2010 12:00 AM    AGRATIO 2.1 05/21/2015 02:58 PM    BILITOT 0.6 02/15/2022 02:33 PM    ALKPHOS 58 02/15/2022 02:33 PM    ALT 16 02/15/2022 02:33 PM    AST 25 02/15/2022 02:33 PM    GLOB 2.1 05/21/2015 02:58 PM     Last Renal Function:   Lab Results   Component Value Date/Time     02/15/2022 02:33 PM    K 3.6 02/15/2022 02:33 PM    CL 98 02/15/2022 02:33 PM    CO2 27 02/15/2022 02:33 PM    GLUCOSE 143 02/15/2022 02:33 PM    GLUCOSE 97 05/03/2012 03:49 PM    BUN 18 02/15/2022 02:33 PM    CREATININE 0.9 02/15/2022 02:33 PM    PHOS 3.9 08/04/2021 01:45 PM    LABALBU 4.3 02/15/2022 02:33 PM    LABALBU 3.8 07/20/2010 12:00 AM    CALCIUM 9.9 02/15/2022 02:33 PM    GFR 74 10/19/2020 06:33 AM    GFR 64 10/19/2020 06:33 AM    GFRAA >60 02/15/2022 02:33 PM    GFRAA >60 06/11/2013 02:18 AM       Last OARRS:   RX Monitoring 6/3/2019   Attestation The Prescription Monitoring Report for this patient was reviewed today.    Periodic Controlled Substance Monitoring -       Preferred Pharmacy:   [id number] [address] [facility] [address] [place] [phone] [facility] [phone]  [address]  Phone: [phone] Fax: [phone] [address] [phone] - F [phone]  [address] [address]  Phone: [phone] Fax: [phone]

## 2022-08-08 RX ORDER — TIZANIDINE 4 MG/1
TABLET ORAL
Qty: 40 TABLET | Refills: 0 | Status: SHIPPED | OUTPATIENT
Start: 2022-08-08 | End: 2022-09-20

## 2022-08-08 NOTE — TELEPHONE ENCOUNTER
Medication:   Requested Prescriptions     Pending Prescriptions Disp Refills    tiZANidine (ZANAFLEX) 4 MG tablet [Pharmacy Med Name: tiZANidine HCL 4MG TABLET] 40 tablet 0     Sig: TAKE ONE TABLET BY MOUTH TWICE A DAY AS NEEDED FOR MUSCLE SPASMS OR PAIN     Last Filled:  8/4/22    Last appt: 5/26/2022   Next appt: Visit date not found

## 2022-08-16 NOTE — TELEPHONE ENCOUNTER
Medication:   Requested Prescriptions     Pending Prescriptions Disp Refills    metoprolol tartrate (LOPRESSOR) 25 MG tablet [Pharmacy Med Name: METOPROLOL TARTRATE 25 MG TAB] 60 tablet      Sig: TAKE TWO TABLETS BY MOUTH TWICE A DAY     Last Filled:  9.21.21    Last appt: 5/26/2022   Next appt: Visit date not found    Last OARRS:   RX Monitoring 6/3/2019   Attestation The Prescription Monitoring Report for this patient was reviewed today.    Periodic Controlled Substance Monitoring -

## 2022-09-20 RX ORDER — TIZANIDINE 4 MG/1
TABLET ORAL
Qty: 40 TABLET | Refills: 0 | Status: SHIPPED | OUTPATIENT
Start: 2022-09-20 | End: 2022-10-20

## 2022-09-20 NOTE — TELEPHONE ENCOUNTER
Medication:   Requested Prescriptions     Pending Prescriptions Disp Refills    tiZANidine (ZANAFLEX) 4 MG tablet [Pharmacy Med Name: tiZANidine HCL 4MG TABLET] 40 tablet 0     Sig: TAKE ONE TABLET BY MOUTH TWICE A DAY AS NEEDED FOR MUSCLE SPASMS OR PAIN     Last Filled:  8/8/2022    Last appt: 5/26/2022   Next appt: Visit date not found    Last OARRS:   RX Monitoring 6/3/2019   Attestation The Prescription Monitoring Report for this patient was reviewed today.    Periodic Controlled Substance Monitoring -

## 2022-10-12 ENCOUNTER — NURSE TRIAGE (OUTPATIENT)
Dept: OTHER | Facility: CLINIC | Age: 87
End: 2022-10-12

## 2022-10-12 ENCOUNTER — TELEPHONE (OUTPATIENT)
Dept: PRIMARY CARE CLINIC | Age: 87
End: 2022-10-12

## 2022-10-12 ENCOUNTER — OFFICE VISIT (OUTPATIENT)
Dept: PRIMARY CARE CLINIC | Age: 87
End: 2022-10-12
Payer: MEDICARE

## 2022-10-12 VITALS
HEIGHT: 65 IN | DIASTOLIC BLOOD PRESSURE: 82 MMHG | SYSTOLIC BLOOD PRESSURE: 140 MMHG | OXYGEN SATURATION: 96 % | HEART RATE: 66 BPM | WEIGHT: 193 LBS | TEMPERATURE: 98.4 F | BODY MASS INDEX: 32.15 KG/M2

## 2022-10-12 DIAGNOSIS — I99.8 FLUCTUATING BLOOD PRESSURE: Primary | ICD-10-CM

## 2022-10-12 DIAGNOSIS — N39.0 COMPLICATED UTI (URINARY TRACT INFECTION): ICD-10-CM

## 2022-10-12 DIAGNOSIS — I10 ESSENTIAL HYPERTENSION: ICD-10-CM

## 2022-10-12 PROBLEM — E11.9 TYPE 2 DIABETES MELLITUS (HCC): Status: ACTIVE | Noted: 2022-10-12

## 2022-10-12 LAB
A/G RATIO: 1.7 (ref 1.1–2.2)
ALBUMIN SERPL-MCNC: 3.9 G/DL (ref 3.4–5)
ALP BLD-CCNC: 71 U/L (ref 40–129)
ALT SERPL-CCNC: 18 U/L (ref 10–40)
ANION GAP SERPL CALCULATED.3IONS-SCNC: 12 MMOL/L (ref 3–16)
AST SERPL-CCNC: 37 U/L (ref 15–37)
BILIRUB SERPL-MCNC: 0.4 MG/DL (ref 0–1)
BUN BLDV-MCNC: 12 MG/DL (ref 7–20)
CALCIUM SERPL-MCNC: 9.7 MG/DL (ref 8.3–10.6)
CHLORIDE BLD-SCNC: 103 MMOL/L (ref 99–110)
CO2: 27 MMOL/L (ref 21–32)
CREAT SERPL-MCNC: 0.7 MG/DL (ref 0.6–1.2)
GFR AFRICAN AMERICAN: >60
GFR NON-AFRICAN AMERICAN: >60
GLUCOSE BLD-MCNC: 99 MG/DL (ref 70–99)
HCT VFR BLD CALC: 39.7 % (ref 36–48)
HEMOGLOBIN: 13 G/DL (ref 12–16)
MCH RBC QN AUTO: 32.5 PG (ref 26–34)
MCHC RBC AUTO-ENTMCNC: 32.9 G/DL (ref 31–36)
MCV RBC AUTO: 99 FL (ref 80–100)
PDW BLD-RTO: 14.7 % (ref 12.4–15.4)
PLATELET # BLD: 264 K/UL (ref 135–450)
PMV BLD AUTO: 9 FL (ref 5–10.5)
POTASSIUM SERPL-SCNC: 4.6 MMOL/L (ref 3.5–5.1)
RBC # BLD: 4.01 M/UL (ref 4–5.2)
SODIUM BLD-SCNC: 142 MMOL/L (ref 136–145)
TOTAL PROTEIN: 6.2 G/DL (ref 6.4–8.2)
WBC # BLD: 8.7 K/UL (ref 4–11)

## 2022-10-12 PROCEDURE — 1123F ACP DISCUSS/DSCN MKR DOCD: CPT | Performed by: NURSE PRACTITIONER

## 2022-10-12 PROCEDURE — 99214 OFFICE O/P EST MOD 30 MIN: CPT | Performed by: NURSE PRACTITIONER

## 2022-10-12 RX ORDER — PREGABALIN 150 MG/1
150 CAPSULE ORAL 2 TIMES DAILY
COMMUNITY

## 2022-10-12 SDOH — ECONOMIC STABILITY: FOOD INSECURITY: WITHIN THE PAST 12 MONTHS, YOU WORRIED THAT YOUR FOOD WOULD RUN OUT BEFORE YOU GOT MONEY TO BUY MORE.: NEVER TRUE

## 2022-10-12 SDOH — ECONOMIC STABILITY: FOOD INSECURITY: WITHIN THE PAST 12 MONTHS, THE FOOD YOU BOUGHT JUST DIDN'T LAST AND YOU DIDN'T HAVE MONEY TO GET MORE.: NEVER TRUE

## 2022-10-12 ASSESSMENT — ENCOUNTER SYMPTOMS
ABDOMINAL PAIN: 0
BLOOD IN STOOL: 0
DIARRHEA: 0
WHEEZING: 0
SHORTNESS OF BREATH: 0
VOMITING: 0
RHINORRHEA: 0
NAUSEA: 0
CHEST TIGHTNESS: 0
COUGH: 0
COLOR CHANGE: 0
ABDOMINAL DISTENTION: 0
CONSTIPATION: 0

## 2022-10-12 ASSESSMENT — PATIENT HEALTH QUESTIONNAIRE - PHQ9
9. THOUGHTS THAT YOU WOULD BE BETTER OFF DEAD, OR OF HURTING YOURSELF: 0
SUM OF ALL RESPONSES TO PHQ QUESTIONS 1-9: 0
10. IF YOU CHECKED OFF ANY PROBLEMS, HOW DIFFICULT HAVE THESE PROBLEMS MADE IT FOR YOU TO DO YOUR WORK, TAKE CARE OF THINGS AT HOME, OR GET ALONG WITH OTHER PEOPLE: 0
SUM OF ALL RESPONSES TO PHQ QUESTIONS 1-9: 0
6. FEELING BAD ABOUT YOURSELF - OR THAT YOU ARE A FAILURE OR HAVE LET YOURSELF OR YOUR FAMILY DOWN: 0
2. FEELING DOWN, DEPRESSED OR HOPELESS: 0
3. TROUBLE FALLING OR STAYING ASLEEP: 0
8. MOVING OR SPEAKING SO SLOWLY THAT OTHER PEOPLE COULD HAVE NOTICED. OR THE OPPOSITE, BEING SO FIGETY OR RESTLESS THAT YOU HAVE BEEN MOVING AROUND A LOT MORE THAN USUAL: 0
4. FEELING TIRED OR HAVING LITTLE ENERGY: 0
7. TROUBLE CONCENTRATING ON THINGS, SUCH AS READING THE NEWSPAPER OR WATCHING TELEVISION: 0
5. POOR APPETITE OR OVEREATING: 0

## 2022-10-12 ASSESSMENT — SOCIAL DETERMINANTS OF HEALTH (SDOH): HOW HARD IS IT FOR YOU TO PAY FOR THE VERY BASICS LIKE FOOD, HOUSING, MEDICAL CARE, AND HEATING?: NOT VERY HARD

## 2022-10-12 NOTE — TELEPHONE ENCOUNTER
Tiffanie Castillo the nurse called for the patient stated that she just came home from the hospital.    She stated that patient's blood pressure fluctuating like 90/50/170    She wanted to know that the patient should continue the following medication    benazepril (LOTENSIN) 40 MG tablet     furosemide (LASIX) 20 MG tablet     Patient is asymptometic    Please review and advice    Thanks    Her call back SO--[phone]

## 2022-10-12 NOTE — PROGRESS NOTES
ENCOUNTER DATE: 10/12/2022     NAME: Edson Wilkinson   AGE: 80 y.o. GENDER: female   YOB: 1934    Patient Active Problem List   Diagnosis    Hyperlipidemia    Essential hypertension    Hypercholesterolemia    Rectocele    Arthritis    Spinal stenosis of lumbar region    Numbness and tingling of both legs    GERD (gastroesophageal reflux disease)    Neuropathy    B12 deficiency    Spinal stenosis    Elevated LFTs    Breast cancer in situ    Allergic rhinitis    Myxoma    Wound check, abscess    Obstructive sleep apnea    Non morbid obesity, unspecified obesity type    Morbidly obese (HCC)    Major depressive disorder, recurrent, mild    Type 2 diabetes mellitus      Allergies   Allergen Reactions    Lodine [Etodolac] Rash     Current Outpatient Medications on File Prior to Visit   Medication Sig Dispense Refill    pregabalin (LYRICA) 150 MG capsule Take 150 mg by mouth 2 times daily.       tiZANidine (ZANAFLEX) 4 MG tablet TAKE ONE TABLET BY MOUTH TWICE A DAY AS NEEDED FOR MUSCLE SPASMS OR PAIN 40 tablet 0    metoprolol tartrate (LOPRESSOR) 25 MG tablet TAKE TWO TABLETS BY MOUTH TWICE A DAY 60 tablet 1    pravastatin (PRAVACHOL) 20 MG tablet TAKE 1 TABLET DAILY 90 tablet 3    vitamin D (ERGOCALCIFEROL) 1.25 MG (65630 UT) CAPS capsule TAKE ONE CAPSULE BY MOUTH ONCE WEEKLY 12 capsule 1    fluticasone (FLONASE) 50 MCG/ACT nasal spray USE 2 SPRAYS NASALLY DAILY 48 g 3    carbidopa-levodopa (SINEMET)  MG per tablet Take 2.5 tablets by mouth 3 times daily      pantoprazole (PROTONIX) 40 MG tablet Take 1 tablet by mouth daily 90 tablet 3    vitamin B-6 (PYRIDOXINE) 100 MG tablet Take 100 mg by mouth daily      vitamin E 400 UNIT capsule Take 400 Units by mouth daily      benazepril (LOTENSIN) 40 MG tablet TAKE ONE TABLET BY MOUTH DAILY (Patient not taking: Reported on 10/12/2022) 90 tablet 0    naproxen (NAPROSYN) 500 MG tablet Take 1 tablet by mouth 2 times daily as needed for Pain (Patient not taking: Reported on 10/12/2022) 60 tablet 0    furosemide (LASIX) 20 MG tablet TAKE ONE TABLET BY MOUTH EVERY MORNING (Patient not taking: Reported on 10/12/2022) 90 tablet 1     No current facility-administered medications on file prior to visit. Social History     Tobacco Use    Smoking status: Never    Smokeless tobacco: Never   Substance Use Topics    Alcohol use: Yes     Comment: very rare occasions      CARE TEAM  Patient Care Team:  Ijeoma Cam MD as PCP - General (Family Medicine)  Ijeoma Cam MD as PCP - [facility]  Geovanny Ruiz MD as Consulting Physician ([facility] Medicine Family [facility])    Chief Complaint   Patient presents with    Follow-Up from Hospital     Was  in  [address] [place] [address]  hypertension        HPI:   Patient is here for a hospital follow up    Admitted 9/27/22 to  with acute cholecystitis, UTI  Discharged 10/2/22  Percutaneous esteban tube placement on 9/28 rather than cholecystectomy because GB was so infected and enlarged. Discharged home on Augmentin, Cipro. She has completed the anbx. BP dropped low the next day and patient was lethargic on 10/6, so EMS was called  Admitted 10/3/22 to   Discharged 10/6/22 (no records available in [address] [place] [address])    Was previously taking benazepril 40mg daily, metoprolol 25mg bid and lasix 40mg daily  Per  (no records available from this admission in care everywhere). Lasix and benazepril were stopped. Metoprolol dose decreased to 12.5mg BID. Monitoring BP closely. Has logs to review  130-160s/70s, then dropping to 105-109/50s after taking metoprolol 12.5mg. HR usually 80-90s. Patient states she does feel dizzy when her BP is low. No symptoms if BP is elevated  No abd pain. Appetite is low. Has lost weight. Not drinking much water. No n/v/d. No fevers. No urinary symptoms. CARDIO:  Saw Cardiology in 8/2021 (Dr Lety Cabrera)  Was told BP parameters <150/80 and to continue on BB indefinitely.      HOME HEALTH:  Has RN coming to visit.   RN checked orthostatic vitals yesterday and  reports no significant drop in numbers with position changes. ROS:  Review of Systems   Constitutional:  Positive for activity change, appetite change and fatigue. Negative for chills, diaphoresis and fever. HENT:  Negative for congestion and rhinorrhea. Respiratory:  Negative for cough, chest tightness, shortness of breath and wheezing. Cardiovascular:  Negative for chest pain, palpitations and leg swelling. Gastrointestinal:  Negative for abdominal distention, abdominal pain, blood in stool, constipation, diarrhea, nausea and vomiting. Genitourinary:  Negative for difficulty urinating, dysuria, hematuria, pelvic pain and urgency. Musculoskeletal:  Negative for myalgias. Skin:  Negative for color change and rash. Neurological:  Positive for light-headedness. Negative for dizziness, weakness and headaches. Hematological:  Negative for adenopathy. VITALS:  BP (!) 140/82   Pulse 66   Temp 98.4 °F (36.9 °C) (Oral)   Ht 5' 5\" (1.651 m)   Wt 193 lb (87.5 kg)   SpO2 96%   BMI 32.12 kg/m²    Wt Readings from Last 3 Encounters:   10/12/22 193 lb (87.5 kg)   05/26/22 208 lb (94.3 kg)   05/09/22 206 lb (93.4 kg)       PE:  Physical Exam  Vitals and nursing note reviewed. Constitutional:       General: She is not in acute distress. Appearance: Normal appearance. She is well-developed and normal weight. She is not diaphoretic. HENT:      Head: Normocephalic and atraumatic. Neck:      Vascular: No carotid bruit. Cardiovascular:      Rate and Rhythm: Normal rate and regular rhythm. Heart sounds: Normal heart sounds. No murmur heard. No friction rub. Pulmonary:      Effort: Pulmonary effort is normal. No respiratory distress. Breath sounds: Normal breath sounds. No wheezing, rhonchi or rales. Abdominal:      General: Abdomen is flat. There is no distension. Palpations: Abdomen is soft. There is no mass. Tenderness: There is no abdominal tenderness. Comments: Petrona drain present. Scant amt of clear-tan liquid present in pouch   Musculoskeletal:      Right lower leg: No edema. Left lower leg: No edema. Skin:     General: Skin is warm and dry. Findings: No rash. Neurological:      Mental Status: She is alert and oriented to person, place, and time. Motor: No weakness. Gait: Gait normal.   Psychiatric:         Mood and Affect: Mood normal.         Behavior: Behavior normal.        ASSESSMENT/PLAN:  1. Fluctuating blood pressure  BP logs reviewed. Hospital records reviewed. Extensive chart review. Will continue on metoprolol 12.5mg bid. Continue to hold benazepril, lasix per hospital instructions. Strict hold parameters given to hold is BP<115 or HR<60. Increase water intake  Check labs today. If BP continues to fluctate and patient is symptomatic, will need to follow up with PCP or Cardio to determine stopping BB and changing to different BP med. Will try to obtain hospital records from 10/3 admission. Nothing available at this time. 2. Complicated UTI (urinary tract infection)  Check urine culture for clearance of recent infection.   - CBC; Future  - Comprehensive Metabolic Panel; Future  - Culture, Urine    3. Essential hypertension  Discussed cardio recommended parameters of <150/80  - CBC; Future  - Comprehensive Metabolic Panel; Future        Return in about 2 weeks (around 10/26/2022) for blood pressure with PCP.      Electronically signed by BRANNON Ochoa CNP on 10/12/2022 at 2:17 PM

## 2022-10-12 NOTE — PATIENT INSTRUCTIONS
Continue on Metoprolol 12.5mg twice daily. Hold metoprolol if BP < 115 or HR <60. Increase water intake    Check labs today. Call to schedule apt with surgery center.    For additional questions or concerns ABOUT YOUR CARE please call the Acute Care Surgery Hotline at [phone]

## 2022-10-13 RX ORDER — NAPROXEN 500 MG/1
TABLET ORAL
Qty: 60 TABLET | Refills: 0 | Status: SHIPPED | OUTPATIENT
Start: 2022-10-13 | End: 2022-11-21

## 2022-10-13 NOTE — TELEPHONE ENCOUNTER
Medication:   Requested Prescriptions     Pending Prescriptions Disp Refills    naproxen (NAPROSYN) 500 MG tablet [Pharmacy Med Name: NAPROXEN 500 MG TABLET] 30 tablet      Sig: TAKE ONE TABLET BY MOUTH TWICE A DAY AS NEEDED FOR PAIN        Last Filled:      Patient Phone Number: [phone] (home)     Last appt: 10/12/2022   Next appt: Visit date not found    Last OARRS:   RX Monitoring 6/3/2019   Attestation The Prescription Monitoring Report for this patient was reviewed today. Periodic Controlled Substance Monitoring -       [facility] Pharm[facility]   [facility]  [address] [address] [address]  Phone: [phone] Fax: [phone] - [address] [phone] - f [phone]  [address] [facility] [address]  Phone: [phone] Fax: [phone]    Medication:   Requested Prescriptions     Pending Prescriptions Disp Refills    naproxen (NAPROSYN) 500 MG tablet [Pharmacy Med Name: NAPROXEN 500 MG TABLET] 30 tablet      Sig: TAKE ONE TABLET BY MOUTH TWICE A DAY AS NEEDED FOR PAIN        Last Filled:      Patient Phone Number: [phone] (home)     Last appt: 10/12/2022   Next appt: Visit date not found    Last OARRS:   RX Monitoring 6/3/2019   Attestation The Prescription Monitoring Report for this patient was reviewed today.    Periodic Controlled Substance Monitoring -       Preferred Pharmacy:   [facility]  [address] [address] [address]  Phone: [phone] Fax: [phone] - [address] [phone] - f [phone]  [address] [facility] [address]  Phone: [phone] Fax: [phone]

## 2022-10-15 LAB
ORGANISM: ABNORMAL
URINE CULTURE, ROUTINE: ABNORMAL

## 2022-10-17 ENCOUNTER — TELEPHONE (OUTPATIENT)
Dept: PRIMARY CARE CLINIC | Age: 87
End: 2022-10-17

## 2022-10-17 NOTE — TELEPHONE ENCOUNTER
Ronak called from [facility] physical therapy for the patient and informed that patient is not eligible for PT.     HER CALL BACK GB==[phone]    Please review and advice    Thanks

## 2022-10-18 DIAGNOSIS — N39.0 COMPLICATED UTI (URINARY TRACT INFECTION): Primary | ICD-10-CM

## 2022-10-18 RX ORDER — NITROFURANTOIN 25; 75 MG/1; MG/1
100 CAPSULE ORAL 2 TIMES DAILY
Qty: 20 CAPSULE | Refills: 0 | Status: SHIPPED | OUTPATIENT
Start: 2022-10-18 | End: 2022-10-21

## 2022-10-20 ENCOUNTER — OFFICE VISIT (OUTPATIENT)
Dept: PRIMARY CARE CLINIC | Age: 87
End: 2022-10-20
Payer: MEDICARE

## 2022-10-20 VITALS
BODY MASS INDEX: 33.15 KG/M2 | DIASTOLIC BLOOD PRESSURE: 78 MMHG | TEMPERATURE: 97.2 F | SYSTOLIC BLOOD PRESSURE: 138 MMHG | WEIGHT: 199.2 LBS

## 2022-10-20 DIAGNOSIS — I10 ESSENTIAL HYPERTENSION: ICD-10-CM

## 2022-10-20 DIAGNOSIS — M54.50 CHRONIC BILATERAL LOW BACK PAIN WITHOUT SCIATICA: ICD-10-CM

## 2022-10-20 DIAGNOSIS — N39.0 COMPLICATED UTI (URINARY TRACT INFECTION): ICD-10-CM

## 2022-10-20 DIAGNOSIS — G89.29 CHRONIC BILATERAL LOW BACK PAIN WITHOUT SCIATICA: ICD-10-CM

## 2022-10-20 DIAGNOSIS — K81.9 CHOLECYSTITIS: Primary | ICD-10-CM

## 2022-10-20 DIAGNOSIS — I99.8 FLUCTUATING BLOOD PRESSURE: ICD-10-CM

## 2022-10-20 PROBLEM — E11.9 TYPE 2 DIABETES MELLITUS (HCC): Status: RESOLVED | Noted: 2022-10-12 | Resolved: 2022-10-20

## 2022-10-20 PROCEDURE — 99214 OFFICE O/P EST MOD 30 MIN: CPT | Performed by: FAMILY MEDICINE

## 2022-10-20 PROCEDURE — 1123F ACP DISCUSS/DSCN MKR DOCD: CPT | Performed by: FAMILY MEDICINE

## 2022-10-20 NOTE — PROGRESS NOTES
SUBJECTIVE:  Patient ID: Pan Dowell is a 80 y.o. y.o. female     HPI   Patient presented today For a follow-up on recent hospital admission,  She presented to the emergency room in September 27 with a complaint of fatigue intermittent abdominal discomfort with diagnosed with acute cholecystitis she was also found UTI she was started on antibiotic on the decision was made by surgery to pursue cholecystectomy tube instead of cholecystectomy to get the infection down she did well with that, she finished a course of antibiotic including Zosyn and Cipro Augmentin  The current main concern for the visit today the fluctuating blood pressure was happening last several days currently taking just metoprolol twice a day half of the dose cording to her  she will have high reading then low reading.   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    Carcinoma nos     Chronic back pain     GERD (gastroesophageal reflux disease)     Hypercholesterolemia     Hyperlipidemia     Hypertension     Major depressive disorder, recurrent, 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    Menopause age unsure    Non morbid obesity, unspecified obesity type 12/4/2018    Obstructive sleep apnea 8/5/2014    Pneumonia     Pneumonia     Postmenopausal hormone replacement therapy     Rectocele 1970s    Seasonal allergies     Sleep apnea 2010    Spinal stenosis of lumbar region     Gets epidural injections every 6 months      Past Surgical History:   Procedure Laterality Date    BREAST BIOPSY Right 5/3/13    biopsy    BREAST LUMPECTOMY 9/27/05    right lumpectomy    [address] [facility]    CATARACT REMOVAL  1/08    bilateral    COLONOSCOPY  2005    30, R7X 7X 2005    CYSTOCELE REPAIR  1989    DILATION AND CURETTAGE OF UTERUS  yrs ago    DOPPLER ECHOCARDIOGRAPHY  2007    EYE SURGERY      bilat catarats    HYSTERECTOMY (CERVIX STATUS UNKNOWN)  4/1974    IR CHOLECYSTOSTOMY PERCUTANEOUS COMPLETE  9/29/2022    IR CHOLECYSTOSTOMY PERCUTANEOUS COMPLETE    JOINT REPLACEMENT      left hip    KNEE SURGERY  1991    L    KNEE SURGERY  1993    R    MOHS SURGERY  2/08    nose    OTHER SURGICAL HISTORY  12/22/06    epidural    RECTOCELE REPAIR  1989    SUTURE REMOVAL  june 2004    sutures in 3 fingers    TONSILLECTOMY      TOTAL HIP ARTHROPLASTY  2006    L     Family History   Problem Relation Age of Onset    Cancer Brother         Prostate    Cancer Mother         Stomach Cancer at age 80    Stroke Father     Cancer Paternal Grandmother         intraadbominal at age 80    Cancer Other         Leukemia age 70     Social History     Socioeconomic History    Marital status:      Spouse name: Monnie Councilman    Number of children: 2    Years of education: None    Highest education level: None   Occupational History     Employer: RETIRED   Tobacco Use    Smoking status: Never    Smokeless tobacco: Never   Vaping Use    Vaping Use: Never used   Substance and Sexual Activity    Alcohol use: Yes     Comment: very rare occasions    Drug use: No    Sexual activity: Yes     Partners: Male     Social Determinants of Health     Financial Resource Strain: Low Risk     Difficulty of Paying Living Expenses: Not very hard   Food Insecurity: No Food Insecurity    Worried About Running Out of Food in the Last Year: Never true    Ran Out of Food in the Last Year: Never true     Current Outpatient Medications   Medication Sig Dispense Refill    naproxen (NAPROSYN) 500 MG tablet TAKE ONE TABLET BY MOUTH TWICE A DAY AS NEEDED FOR PAIN 60 tablet 0    pregabalin (LYRICA) 150 MG capsule Take 150 mg by mouth 2 times daily. tiZANidine (ZANAFLEX) 4 MG tablet TAKE ONE TABLET BY MOUTH TWICE A DAY AS NEEDED FOR MUSCLE SPASMS OR PAIN 40 tablet 0    metoprolol tartrate (LOPRESSOR) 25 MG tablet TAKE TWO TABLETS BY MOUTH TWICE A DAY (Patient taking differently: 1/2 of the pill twice a day. The home nurse states that the medication needs to be even less than a half a pill twice a day. Patient took a 1/4 of the pill today.) 60 tablet 1    pravastatin (PRAVACHOL) 20 MG tablet TAKE 1 TABLET DAILY 90 tablet 3    fluticasone (FLONASE) 50 MCG/ACT nasal spray USE 2 SPRAYS NASALLY DAILY 48 g 3    carbidopa-levodopa (SINEMET)  MG per tablet Take 2.5 tablets by mouth 3 times daily      pantoprazole (PROTONIX) 40 MG tablet Take 1 tablet by mouth daily 90 tablet 3    vitamin B-6 (PYRIDOXINE) 100 MG tablet Take 100 mg by mouth daily      vitamin E 400 UNIT capsule Take 400 Units by mouth daily      nitrofurantoin, macrocrystal-monohydrate, (MACROBID) 100 MG capsule Take 1 capsule by mouth 2 times daily for 10 days (Patient not taking: Reported on 10/20/2022) 20 capsule 0    benazepril (LOTENSIN) 40 MG tablet TAKE ONE TABLET BY MOUTH DAILY (Patient not taking: Reported on 10/20/2022) 90 tablet 0    furosemide (LASIX) 20 MG tablet TAKE ONE TABLET BY MOUTH EVERY MORNING (Patient not taking: Reported on 10/12/2022) 90 tablet 1    vitamin D (ERGOCALCIFEROL) 1.25 MG (26510 UT) CAPS capsule TAKE ONE CAPSULE BY MOUTH ONCE WEEKLY 12 capsule 1     No current facility-administered medications for this visit. Allergies   Allergen Reactions    Lodine [Etodolac] Rash       Review of Systems   Constitutional:  Positive for fatigue. Negative for chills and fever. HENT: Negative. Eyes: Negative. Respiratory: Negative. Cardiovascular: Negative. Gastrointestinal: Negative. Neurological:  Positive for weakness.      OBJECTIVE:  /78 (Site: Left Upper Arm, Position: Sitting, Cuff Size: Medium Adult)   Temp 97.2 °F (36.2 °C)   Wt 199 lb 3.2 oz (90.4 kg)   BMI 33.15 kg/m²     Physical Exam  Vitals reviewed. Constitutional:       Appearance: Normal appearance. Eyes:      General: No scleral icterus. Conjunctiva/sclera: Conjunctivae normal.   Neck:      Thyroid: No thyromegaly. Vascular: No JVD. Cardiovascular:      Rate and Rhythm: Normal rate and regular rhythm. Heart sounds: Normal heart sounds. No murmur heard. No friction rub. No gallop. Pulmonary:      Effort: Pulmonary effort is normal.      Breath sounds: Normal breath sounds. No wheezing or rales. Abdominal:      General: Bowel sounds are normal. There is no distension. Palpations: Abdomen is soft. There is no mass. Tenderness: There is no abdominal tenderness. Hernia: No hernia is present. Lymphadenopathy:      Cervical: No cervical adenopathy. Skin:     Findings: No rash. Neurological:      Mental Status: She is alert and oriented to person, place, and time. ASSESSMENT:   Diagnosis Orders   1. Cholecystitis        2. Essential hypertension        3. Complicated UTI (urinary tract infection)        4. Fluctuating blood pressure        5.  Chronic bilateral low back pain without sciatica              PLAN:  S/p cholecystectomy tube  Followed by surgery  Home health care nursing the patient will call them to extend their care  Monitor blood pressure  Need to repeat urine culture  I reviewed hospital records consultant notes prior follow-up blood work and cultures  Stay off the muscle relaxant  Crease hydration  Call with any concern

## 2022-10-21 ASSESSMENT — ENCOUNTER SYMPTOMS
GASTROINTESTINAL NEGATIVE: 1
RESPIRATORY NEGATIVE: 1
EYES NEGATIVE: 1

## 2022-10-28 ENCOUNTER — TELEPHONE (OUTPATIENT)
Dept: PRIMARY CARE CLINIC | Age: 87
End: 2022-10-28

## 2022-10-28 NOTE — TELEPHONE ENCOUNTER
Mishel Riley from [facility] is reporting patient vital     10/24   morning 173/74 pulse 78               Pm     137/70 pulse was 81    10/25  morning   145/69 pulse 72  Pm          144/75 pulse 73    10/26 morning    150/74 pulse 71  Pm          163/77 pulse 81    10/27 morning     161/81 pulse 80  Pm           149/85 pulse was 85     Today morning   165/72 pulse 78

## 2022-10-31 RX ORDER — ERGOCALCIFEROL 1.25 MG/1
CAPSULE ORAL
Qty: 4 CAPSULE | Refills: 0 | Status: SHIPPED | OUTPATIENT
Start: 2022-10-31 | End: 2022-11-29 | Stop reason: SDUPTHER

## 2022-10-31 NOTE — TELEPHONE ENCOUNTER
Medication:   Requested Prescriptions     Pending Prescriptions Disp Refills    vitamin D (ERGOCALCIFEROL) 1.25 MG (11899 UT) CAPS capsule [Pharmacy Med Name: VIT D2 (ERGOCAL) 1.25MG(50,000U) CP] 4 capsule      Sig: TAKE ONE CAPSULE BY MOUTH ONCE WEEKLY     Last Filled:  4/18/2022    Last appt: 10/20/2022   Next appt: Visit date not found    Last OARRS:   RX Monitoring 6/3/2019   Attestation The Prescription Monitoring Report for this patient was reviewed today.    Periodic Controlled Substance Monitoring -

## 2022-11-01 NOTE — TELEPHONE ENCOUNTER
Medication:   Requested Prescriptions     Pending Prescriptions Disp Refills    pravastatin (PRAVACHOL) 20 MG tablet 90 tablet 0       Last Filled:      Patient Phone Number: [phone] (home)     Last appt: 10/20/2022   Next appt: Visit date not found    Last Lipid:   Lab Results   Component Value Date/Time    CHOL 167 08/12/2019 03:50 PM    TRIG 111 08/12/2019 03:50 PM    HDL 64 08/12/2019 03:50 PM    HDL 63 05/30/2012 11:19 AM    LDLCALC 81 08/12/2019 03:50 PM       Last OARRS:   RX Monitoring 6/3/2019   Attestation The Prescription Monitoring Report for this patient was reviewed today. Periodic Controlled Substance Monitoring -       Preferred Pharmacy:   [address] [address]  Phone: [phone] Fax: [phone] [place] [phone] - f [phone]  [address] [place] [address]  Phone: [phone] Fax: [phone]      Medication:   Requested Prescriptions     Pending Prescriptions Disp Refills    pravastatin (PRAVACHOL) 20 MG tablet 90 tablet 0       Last Filled:      Patient Phone Number: [phone] (home)     Last appt: 10/20/2022   Next appt: Visit date not found    Last Lipid:   Lab Results   Component Value Date/Time    CHOL 167 08/12/2019 03:50 PM    TRIG 111 08/12/2019 03:50 PM    HDL 64 08/12/2019 03:50 PM    HDL 63 05/30/2012 11:19 AM    LDLCALC 81 08/12/2019 03:50 PM       Last OARRS:   RX Monitoring 6/3/2019   Attestation The Prescription Monitoring Report for this patient was reviewed today.    Periodic Controlled Substance Monitoring -       Preferred Pharmacy:   [address]  Phone: [phone] Fax: [phone]    Shy Conley

## 2022-11-02 RX ORDER — PRAVASTATIN SODIUM 20 MG
TABLET ORAL
Qty: 90 TABLET | Refills: 0 | Status: SHIPPED | OUTPATIENT
Start: 2022-11-02

## 2022-11-04 ENCOUNTER — TELEPHONE (OUTPATIENT)
Dept: PRIMARY CARE CLINIC | Age: 87
End: 2022-11-04

## 2022-11-04 NOTE — TELEPHONE ENCOUNTER
[facility] - Darryl Dow - reporting results   [phone]    Weekly Blood pressure reports  10/31 - am 165/80  - pulse - 72 -- PM - 127/70  Pulse 104  11/1 - am 162/74  pulse 78   /87 Pulse 102  11/2- am 153/83 pulse 92  ---/83 pulse 82  11/3/- am 152/77 pulse 68 ---/80  pulse 83  11/4 am - 150/72 pulse 63    *Surosenide no longer takes - patient now has edema in left foot +2  *Patient has muscle spasms    Has recent U/A report been received?

## 2022-11-07 ENCOUNTER — TELEPHONE (OUTPATIENT)
Dept: PRIMARY CARE CLINIC | Age: 87
End: 2022-11-07

## 2022-11-07 NOTE — TELEPHONE ENCOUNTER
I was out of the office  These complaint needs to be addressed on the day of the phone call  If patient continued to have this issue needs to be seen in the office

## 2022-11-08 NOTE — TELEPHONE ENCOUNTER
Spoke with home health Teresa Hull) advised pt should schedule an appt. She said she says the pt on Fridays. I reached out to the pt, was unable to leave a message - no VM. Please try pt again.

## 2022-11-11 ENCOUNTER — TELEPHONE (OUTPATIENT)
Dept: PRIMARY CARE CLINIC | Age: 87
End: 2022-11-11

## 2022-11-11 NOTE — TELEPHONE ENCOUNTER
Called patient. She was confused. [facility] has been trying to reach her and has not gotten through. She needs to call them and get the appt scheduled through them. Phone number given to her and she will call them.

## 2022-11-11 NOTE — TELEPHONE ENCOUNTER
Mildred Feldman called from [place] [phone] for the patient and stated that they have discharged the patient from occupational therapy today  Because patient's insurance is denied the activity.     Please advice    Call back --[phone]    Thanks

## 2022-11-14 ENCOUNTER — OFFICE VISIT (OUTPATIENT)
Dept: PRIMARY CARE CLINIC | Age: 87
End: 2022-11-14
Payer: MEDICARE

## 2022-11-14 VITALS
HEART RATE: 79 BPM | DIASTOLIC BLOOD PRESSURE: 78 MMHG | BODY MASS INDEX: 32.52 KG/M2 | SYSTOLIC BLOOD PRESSURE: 124 MMHG | OXYGEN SATURATION: 97 % | WEIGHT: 195.4 LBS | TEMPERATURE: 97.2 F

## 2022-11-14 DIAGNOSIS — E78.2 MIXED HYPERLIPIDEMIA: ICD-10-CM

## 2022-11-14 DIAGNOSIS — I10 PRIMARY HYPERTENSION: ICD-10-CM

## 2022-11-14 DIAGNOSIS — R25.1 TREMOR: ICD-10-CM

## 2022-11-14 DIAGNOSIS — K82.9 GALLBLADDER DISORDER: Primary | ICD-10-CM

## 2022-11-14 PROBLEM — E66.01 MORBIDLY OBESE (HCC): Status: RESOLVED | Noted: 2020-09-15 | Resolved: 2022-11-14

## 2022-11-14 PROBLEM — E66.9 NON MORBID OBESITY, UNSPECIFIED OBESITY TYPE: Chronic | Status: RESOLVED | Noted: 2018-12-04 | Resolved: 2022-11-14

## 2022-11-14 PROCEDURE — 1123F ACP DISCUSS/DSCN MKR DOCD: CPT | Performed by: FAMILY MEDICINE

## 2022-11-14 PROCEDURE — 99214 OFFICE O/P EST MOD 30 MIN: CPT | Performed by: FAMILY MEDICINE

## 2022-11-14 ASSESSMENT — ENCOUNTER SYMPTOMS
APNEA: 1
CHEST TIGHTNESS: 0
BACK PAIN: 1
WHEEZING: 0
SHORTNESS OF BREATH: 0

## 2022-11-14 NOTE — PROGRESS NOTES
SUBJECTIVE:  Patient ID: Jeff Mccain is a 80 y.o. female. Chief Complaint:  Chief Complaint   Patient presents with    Gastroesophageal Reflux     Follow up for medication refill.         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    Carcinoma nos     Chronic back pain     GERD (gastroesophageal reflux disease)     Hypercholesterolemia     Hypertension     Major depressive disorder, recurrent, 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    Menopause age unsure    Non morbid obesity, unspecified obesity type 12/04/2018    Obstructive sleep apnea 08/05/2014    Postmenopausal hormone replacement therapy     Rectocele 1970s    Seasonal allergies     Sleep apnea 2010    Spinal stenosis of lumbar region     Gets epidural injections every 6 months       Patient Active Problem List   Diagnosis    Hyperlipidemia    Essential hypertension    Hypercholesterolemia    Rectocele    Arthritis    Spinal stenosis of lumbar region    Numbness and tingling of both legs    GERD (gastroesophageal reflux disease)    Neuropathy    B12 deficiency    Spinal stenosis    Breast cancer in situ    Allergic rhinitis    Myxoma    Obstructive sleep apnea    Major depressive disorder, recurrent, mild     Current Outpatient Medications   Medication Sig Dispense Refill    pravastatin (PRAVACHOL) 20 MG tablet One po qd 90 tablet 0    vitamin D (ERGOCALCIFEROL) 1.25 MG (28714 UT) CAPS capsule TAKE ONE CAPSULE BY MOUTH ONCE WEEKLY 4 capsule 0    naproxen (NAPROSYN) 500 MG tablet TAKE ONE TABLET BY MOUTH TWICE A DAY AS NEEDED FOR PAIN 60 tablet 0    pregabalin (LYRICA) 150 MG capsule Take 150 mg by mouth 2 times daily. fluticasone (FLONASE) 50 MCG/ACT nasal spray USE 2 SPRAYS NASALLY DAILY 48 g 3    carbidopa-levodopa (SINEMET)  MG per tablet Take 2.5 tablets by mouth 3 times daily      pantoprazole (PROTONIX) 40 MG tablet Take 1 tablet by mouth daily 90 tablet 3    vitamin B-6 (PYRIDOXINE) 100 MG tablet Take 100 mg by mouth daily      vitamin E 400 UNIT capsule Take 400 Units by mouth daily      metoprolol tartrate (LOPRESSOR) 25 MG tablet TAKE TWO TABLETS BY MOUTH TWICE A DAY (Patient taking differently: 1/2 of the pill twice a day. The home nurse states that the medication needs to be even less than a half a pill twice a day. Patient took a 1/4 of the pill today.) 60 tablet 1     No current facility-administered medications for this visit.      Lab Results   Component Value Date    WBC 8.7 10/12/2022    HGB 13.0 10/12/2022    HCT 39.7 10/12/2022    MCV 99.0 10/12/2022     10/12/2022     Lab Results   Component Value Date    CHOL 167 08/12/2019    CHOL 172 04/26/2019    CHOL 161 09/17/2018     Lab Results   Component Value Date    TRIG 111 08/12/2019    TRIG 160 (H) 04/26/2019    TRIG 124 09/17/2018     Lab Results   Component Value Date    HDL 64 (H) 08/12/2019    HDL 49 04/26/2019    HDL 57 09/17/2018     Lab Results   Component Value Date    LDLCALC 81 08/12/2019    LDLCALC 91 04/26/2019    LDLCALC 79 09/17/2018     Lab Results   Component Value Date    LABVLDL 22 08/12/2019    LABVLDL 32 04/26/2019    LABVLDL 25 09/17/2018     No results found for: [facility]    Chemistry        Component Value Date/Time     10/12/2022 1318    K 4.6 10/12/2022 1318     10/12/2022 1318    CO2 27 10/12/2022 1318    BUN 12 10/12/2022 1318    CREATININE 0.7 10/12/2022 1318        Component Value Date/Time    CALCIUM 9.7 10/12/2022 1318    ALKPHOS 71 10/12/2022 1318    AST 37 10/12/2022 1318    ALT 18 10/12/2022 1318    BILITOT 0.4 10/12/2022 1318        Hemoglobin A1C   Date hypertension        3. Mixed hyperlipidemia        4. Tremor        5.  BMI 32.0-32.9,adult              Admission @[facility] 10/3/2022  Cardiology evaluation @ [address] ECHO  CT head done @[facility] 10/3/2022  Gall Bladder disorder + Tube last CT abdomen 9/27/2022  CT pulmonary Angiography  10/3/2022  Surgeon visit 11/16/2022 @ [facility] check GB tube & schedule surgery

## 2022-11-18 ENCOUNTER — TELEPHONE (OUTPATIENT)
Dept: PRIMARY CARE CLINIC | Age: 87
End: 2022-11-18

## 2022-11-18 NOTE — TELEPHONE ENCOUNTER
[phone] (home)   Spoke w/patient   Home nurse Gabriel Nida is scheduled to see her today.    Pt stated that she has a \"sore spot\" where the drain is   States that BP has been OK- is tracking     Metoprolol 25mg-pt is taking 12.5mg BID,     11/18 150/69  11/17 167/80   11/16  178/84  10/20   114/64    10/19    167/81     PM reading 116/77  10/18 114/64  10/17 116/73  10/16  116/77  10/15 146/68    Pt scheduled 11/21 @ 1030am w/ [address] [place] [address] [phone]  LM to call office regarding patient, BP and Medications

## 2022-11-21 ENCOUNTER — OFFICE VISIT (OUTPATIENT)
Dept: PRIMARY CARE CLINIC | Age: 87
End: 2022-11-21
Payer: MEDICARE

## 2022-11-21 VITALS
OXYGEN SATURATION: 96 % | DIASTOLIC BLOOD PRESSURE: 68 MMHG | BODY MASS INDEX: 32.52 KG/M2 | WEIGHT: 195.2 LBS | RESPIRATION RATE: 14 BRPM | TEMPERATURE: 98 F | HEART RATE: 80 BPM | SYSTOLIC BLOOD PRESSURE: 142 MMHG | HEIGHT: 65 IN

## 2022-11-21 DIAGNOSIS — I10 PRIMARY HYPERTENSION: Primary | ICD-10-CM

## 2022-11-21 DIAGNOSIS — M54.50 CHRONIC BILATERAL LOW BACK PAIN WITHOUT SCIATICA: ICD-10-CM

## 2022-11-21 DIAGNOSIS — G89.29 CHRONIC BILATERAL LOW BACK PAIN WITHOUT SCIATICA: ICD-10-CM

## 2022-11-21 DIAGNOSIS — Z23 NEED FOR INFLUENZA VACCINATION: ICD-10-CM

## 2022-11-21 DIAGNOSIS — K81.9 CHOLECYSTITIS: ICD-10-CM

## 2022-11-21 PROBLEM — E11.9 TYPE 2 DIABETES MELLITUS (HCC): Status: ACTIVE | Noted: 2022-11-21

## 2022-11-21 PROCEDURE — 1123F ACP DISCUSS/DSCN MKR DOCD: CPT | Performed by: FAMILY MEDICINE

## 2022-11-21 PROCEDURE — 99214 OFFICE O/P EST MOD 30 MIN: CPT | Performed by: FAMILY MEDICINE

## 2022-11-21 PROCEDURE — G0008 ADMIN INFLUENZA VIRUS VAC: HCPCS | Performed by: FAMILY MEDICINE

## 2022-11-21 PROCEDURE — 90694 VACC AIIV4 NO PRSRV 0.5ML IM: CPT | Performed by: FAMILY MEDICINE

## 2022-11-21 RX ORDER — ASCORBIC ACID 500 MG
1000 TABLET ORAL DAILY
COMMUNITY

## 2022-11-21 RX ORDER — MECLIZINE HYDROCHLORIDE 25 MG/1
25 TABLET ORAL
COMMUNITY

## 2022-11-21 RX ORDER — LANOLIN ALCOHOL/MO/W.PET/CERES
1000 CREAM (GRAM) TOPICAL DAILY
COMMUNITY

## 2022-11-21 RX ORDER — PREGABALIN 75 MG/1
75 CAPSULE ORAL 2 TIMES DAILY
COMMUNITY

## 2022-11-21 ASSESSMENT — ENCOUNTER SYMPTOMS
RESPIRATORY NEGATIVE: 1
ABDOMINAL PAIN: 1
EYES NEGATIVE: 1

## 2022-11-21 NOTE — PROGRESS NOTES
SUBJECTIVE:  Patient ID: Ann Marie Bolton is a 80 y.o. y.o. female     HPI   Patient presented today for her third  for follow-up on hypertension, patient was diagnosed recently with cholecystitis she is in acute attack when they decided to put a drain in and since then she has been having issue on and off with her blood pressure plus she is really bothered by the tube and she wants to have surgery she met with her surgeon on the 16th November, he addressed the risk and the possible outcome for surgery been very high risk especially after reviewing her CT scan and the possibility if converting the surgery to open described as morbid operation for her,  Encouraged the patient to call back follow-up with him with her kids present discussed all the options and make decision  She has above preop physical I explained the patient the surgery needs to be scheduled first if she choose to do so  According to patient he is young he is just had 2 years of experience she preferred to see somebody has more experience and he was willing to refer her to somebody else  Her blood pressure been up and down but mostly within reasonable range a couple of readings in the 15-/160 otherwise in 130s 140s I think acceptable for her age  I reviewed the medication in great detail  She will need to look at the 1 side for the tube was placed according to patient home health care is done  Past Medical History:   Diagnosis Date    Arthritis     Basal cell carcinoma of nose 09/2010    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    Carcinoma nos     Chronic back pain     GERD (gastroesophageal reflux disease)     Hypercholesterolemia     Hypertension     Major depressive disorder, recurrent, 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    Menopause age unsure    Non morbid obesity, unspecified obesity type 12/04/2018    Obstructive sleep apnea 08/05/2014    Postmenopausal hormone replacement therapy     Rectocele 1970s    Seasonal allergies     Sleep apnea 2010    Spinal stenosis of lumbar region     Gets epidural injections every 6 months      Past Surgical History:   Procedure Laterality Date    BREAST BIOPSY Right 5/3/13    biopsy    BREAST LUMPECTOMY  9/27/05    right lumpectomy    [address]  1/08    bilateral    COLONOSCOPY  2005    30, R7X 7X 2005    CYSTOCELE REPAIR  1989    DILATION AND CURETTAGE OF UTERUS  yrs ago    DOPPLER ECHOCARDIOGRAPHY  2007    EYE SURGERY      bilat catarats    HYSTERECTOMY (CERVIX STATUS UNKNOWN)  4/1974    IR CHOLECYSTOSTOMY PERCUTANEOUS COMPLETE  9/29/2022    IR CHOLECYSTOSTOMY PERCUTANEOUS COMPLETE    JOINT REPLACEMENT      left hip    KNEE SURGERY  1991    L    KNEE SURGERY  1993    R    MOHS SURGERY  2/08    nose    OTHER SURGICAL HISTORY  12/22/06    epidural    RECTOCELE REPAIR  1989    SUTURE REMOVAL  june 2004    sutures in 3 fingers    TONSILLECTOMY      TOTAL HIP ARTHROPLASTY  2006    L     Family History   Problem Relation Age of Onset    Cancer Brother         Prostate    Cancer Mother         Stomach Cancer at age 80    Stroke Father     Cancer Paternal Grandmother         intraadbominal at age 80    Cancer Other         Leukemia age 70     Social History     Socioeconomic History    Marital status:      Spouse name: Stanley Hicks    Number of children: 2    Years of education: None    Highest education level: None   Occupational History     Employer: RETIRED   Tobacco Use    Smoking status: Never    Smokeless tobacco: Never   Vaping Use    Vaping Use: Never used   Substance and Sexual Activity    Alcohol use: Yes     Comment: very rare occasions    Drug use: No    Sexual activity: Yes     Partners: Male     Social Determinants of Health     Financial Resource Strain: Low Risk     Difficulty of Paying Living Expenses: Not very hard   Food Insecurity: No Food Insecurity    Worried About Running Out of Food in the Last Year: Never true    Ran Out of Food in the Last Year: Never true     Current Outpatient Medications   Medication Sig Dispense Refill    vitamin C (ASCORBIC ACID) 500 MG tablet Take 1,000 mg by mouth daily      meclizine (ANTIVERT) 25 MG tablet Take 25 mg by mouth daily (with breakfast)      vitamin B-12 (CYANOCOBALAMIN) 1000 MCG tablet Take 1,000 mcg by mouth daily      Multiple Vitamins-Minerals (EYE VITAMINS PO) Take by mouth      pregabalin (LYRICA) 75 MG capsule Take 75 mg by mouth 2 times daily. pravastatin (PRAVACHOL) 20 MG tablet One po qd 90 tablet 0    vitamin D (ERGOCALCIFEROL) 1.25 MG (66395 UT) CAPS capsule TAKE ONE CAPSULE BY MOUTH ONCE WEEKLY 4 capsule 0    naproxen (NAPROSYN) 500 MG tablet TAKE ONE TABLET BY MOUTH TWICE A DAY AS NEEDED FOR PAIN 60 tablet 0    metoprolol tartrate (LOPRESSOR) 25 MG tablet TAKE TWO TABLETS BY MOUTH TWICE A DAY (Patient taking differently: Take 12.5 mg by mouth 2 times daily 1/2 of the pill twice a day. The home nurse states that the medication needs to be even less than a half a pill twice a day. Patient took a 1/4 of the pill today.) 60 tablet 1    fluticasone (FLONASE) 50 MCG/ACT nasal spray USE 2 SPRAYS NASALLY DAILY 48 g 3    carbidopa-levodopa (SINEMET)  MG per tablet Take 2.5 tablets by mouth 3 times daily      pantoprazole (PROTONIX) 40 MG tablet Take 1 tablet by mouth daily 90 tablet 3    vitamin B-6 (PYRIDOXINE) 100 MG tablet Take 100 mg by mouth daily      vitamin E 400 UNIT capsule Take 400 Units by mouth daily       No current facility-administered medications for this visit. Allergies   Allergen Reactions    Lodine [Etodolac] Rash       Review of Systems   Constitutional: Negative. HENT: Negative. Eyes: Negative. Respiratory: Negative. Cardiovascular: Negative. Gastrointestinal:  Positive for abdominal pain. All other systems reviewed and are negative.     OBJECTIVE:  BP (!) 142/68   Pulse 80   Temp 98 °F (36.7 °C) (Temporal)   Resp 14   Ht 5' 5\" (1.651 m)   Wt 195 lb 3.2 oz (88.5 kg)   SpO2 96%   BMI 32.48 kg/m²     Physical Exam  Vitals reviewed. Constitutional:       Appearance: Normal appearance. HENT:      Head: Normocephalic and atraumatic. Eyes:      General: No scleral icterus. Conjunctiva/sclera: Conjunctivae normal.   Neck:      Thyroid: No thyromegaly. Vascular: No JVD. Cardiovascular:      Rate and Rhythm: Normal rate and regular rhythm. Heart sounds: Normal heart sounds. No murmur heard. No friction rub. No gallop. Pulmonary:      Effort: Pulmonary effort is normal.      Breath sounds: Normal breath sounds. No wheezing or rales. Abdominal:      General: Bowel sounds are normal. There is no distension. Palpations: Abdomen is soft. There is no mass. Tenderness: There is no abdominal tenderness. Hernia: No hernia is present. Comments: Right upper quadrant tube in place very minimal rotation around it just to monitor for now   Lymphadenopathy:      Cervical: No cervical adenopathy. Skin:     Findings: No rash. Neurological:      Mental Status: She is alert and oriented to person, place, and time. ASSESSMENT:   Diagnosis Orders   1. Primary hypertension        2. Need for influenza vaccination  Influenza, FLUAD, (age 72 y+), IM, Preservative Free, 0.5 mL      3. Cholecystitis        4.  Chronic bilateral low back pain without sciatica            PLAN:  Reviewed all her medication with her   Updated our list and hers copy is given today  Reviewed the consultant note reviewed in detail with the patient and her  recommend to discuss it as a family and go back to her surgeon,  If she decides for the surgery she needs to have a preop she will scheduled with me after she is scheduled at [address] [place] [address] to monitor blood pressure okay to have readings slightly elevated  Skin care discussed with the patient we may  We may contact home health care for wound care

## 2022-11-23 ENCOUNTER — TELEPHONE (OUTPATIENT)
Dept: PRIMARY CARE CLINIC | Age: 87
End: 2022-11-23

## 2022-11-23 NOTE — TELEPHONE ENCOUNTER
Patient called and stated that she is taking following tablet 1 and 1/2  by mouth twice a day. metoprolol tartrate (LOPRESSOR) 25 MG tablet     She stated that if she takes 2 tabs it lowers her blood pressue.     She wants to discuss with DR MARSHALL ADVICE    Thanks

## 2022-11-23 NOTE — TELEPHONE ENCOUNTER
Reviewed Dr. Lawson note from 11/21, no changes to medications were made    I would advise her to continue taking same dose as she has been taking when she visited Dr. Anabell Hickman on [phone] and try to not make any changes.   As per Dr. Lawson note it is \"okay to have readings slightly elevated\"    Dr. Colleen Calderon    Will route to Dr. Anabell Hickman as well

## 2022-11-23 NOTE — TELEPHONE ENCOUNTER
Spoke to patient and gave her Dr Pavithra Gill recommendation. I clarify with pt  about previous encounter patient states she is taking 12.5 mg Lopressor bid. Pt voices understood.

## 2022-11-29 ENCOUNTER — OFFICE VISIT (OUTPATIENT)
Dept: PRIMARY CARE CLINIC | Age: 87
End: 2022-11-29
Payer: MEDICARE

## 2022-11-29 VITALS
BODY MASS INDEX: 32.45 KG/M2 | WEIGHT: 195 LBS | SYSTOLIC BLOOD PRESSURE: 144 MMHG | HEART RATE: 70 BPM | DIASTOLIC BLOOD PRESSURE: 80 MMHG | OXYGEN SATURATION: 97 % | TEMPERATURE: 97.6 F | RESPIRATION RATE: 14 BRPM

## 2022-11-29 DIAGNOSIS — I10 PRIMARY HYPERTENSION: ICD-10-CM

## 2022-11-29 DIAGNOSIS — K82.9 GALLBLADDER DISORDER: ICD-10-CM

## 2022-11-29 DIAGNOSIS — M54.50 CHRONIC BILATERAL LOW BACK PAIN WITHOUT SCIATICA: ICD-10-CM

## 2022-11-29 DIAGNOSIS — G89.29 CHRONIC BILATERAL LOW BACK PAIN WITHOUT SCIATICA: ICD-10-CM

## 2022-11-29 DIAGNOSIS — K81.9 CHOLECYSTITIS: ICD-10-CM

## 2022-11-29 DIAGNOSIS — N39.0 COMPLICATED UTI (URINARY TRACT INFECTION): Primary | ICD-10-CM

## 2022-11-29 PROBLEM — E11.9 TYPE 2 DIABETES MELLITUS (HCC): Status: RESOLVED | Noted: 2022-11-21 | Resolved: 2022-11-29

## 2022-11-29 LAB
BILIRUBIN, POC: ABNORMAL
BLOOD URINE, POC: ABNORMAL
CLARITY, POC: ABNORMAL
COLOR, POC: ABNORMAL
GLUCOSE URINE, POC: ABNORMAL
KETONES, POC: ABNORMAL
LEUKOCYTE EST, POC: ABNORMAL
NITRITE, POC: POSITIVE
PH, POC: 6
PROTEIN, POC: ABNORMAL
SPECIFIC GRAVITY, POC: 1.02
UROBILINOGEN, POC: 0.2

## 2022-11-29 PROCEDURE — 99214 OFFICE O/P EST MOD 30 MIN: CPT | Performed by: FAMILY MEDICINE

## 2022-11-29 PROCEDURE — 1123F ACP DISCUSS/DSCN MKR DOCD: CPT | Performed by: FAMILY MEDICINE

## 2022-11-29 PROCEDURE — 81002 URINALYSIS NONAUTO W/O SCOPE: CPT | Performed by: FAMILY MEDICINE

## 2022-11-29 RX ORDER — NITROFURANTOIN 25; 75 MG/1; MG/1
100 CAPSULE ORAL 2 TIMES DAILY
Qty: 20 CAPSULE | Refills: 0 | Status: SHIPPED | OUTPATIENT
Start: 2022-11-29 | End: 2022-12-09

## 2022-11-29 RX ORDER — ERGOCALCIFEROL 1.25 MG/1
CAPSULE ORAL
Qty: 12 CAPSULE | Refills: 2 | Status: SHIPPED | OUTPATIENT
Start: 2022-11-29

## 2022-11-29 ASSESSMENT — ENCOUNTER SYMPTOMS
RESPIRATORY NEGATIVE: 1
GASTROINTESTINAL NEGATIVE: 1
EYES NEGATIVE: 1

## 2022-11-29 NOTE — PROGRESS NOTES
hoSUBJECTIVE:  Patient ID: Nilam Simmons is a 80 y.o. y.o. female       Hypertension: Patient here for follow-up of elevated blood pressure. She is not exercising and is adherent to low salt diet. Blood pressure is well controlled at home. Cardiac symptoms none. Patient denies chest pain, chest pressure/discomfort, claudication, dyspnea, exertional chest pressure/discomfort, fatigue, irregular heart beat, lower extremity edema, near-syncope, orthopnea, palpitations, paroxysmal nocturnal dyspnea and syncope. Cardiovascular risk factors: advanced age (older than 54 for men, 72 for women), dyslipidemia, hypertension and obesity (BMI >= 30 kg/m2). Use of agents associated with hypertension: none. History of target organ damage: none.   She brought her cuff with her which was not comparable to our readings advised the patient and her  to buy a new one  Patient with GERD stable on medication  Patient complain burning with urination just started today do not feel comfortable lower abdominal discomfort  She had history of recurrent UTI  Patient is status post Christie cystitis status postdrainage she has been followed closely by a surgeon at [facility] she is scheduled to have a test this week and may be seeing him after  Being a high risk for surgery they advised her to stay the same way with the gallbladder tube draining and is functioning okay  Is interested to get home health care to help her with a wound care part    Past Medical History:   Diagnosis Date    Arthritis     Basal cell carcinoma of nose 09/2010    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    Carcinoma nos     Chronic back pain     GERD (gastroesophageal reflux disease)     Hypercholesterolemia     Hypertension     Major depressive disorder, recurrent, 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    Menopause age unsure    Non morbid obesity, unspecified obesity type 12/04/2018    Obstructive sleep apnea 08/05/2014    Postmenopausal hormone replacement therapy     Rectocele 1970s    Seasonal allergies     Sleep apnea 2010    Spinal stenosis of lumbar region     Gets epidural injections every 6 months      Past Surgical History:   Procedure Laterality Date    BREAST BIOPSY Right 5/3/13    biopsy    BREAST LUMPECTOMY  9/27/05    right lumpectomy    [address]  1/08    bilateral    COLONOSCOPY  2005    30, R7X 7X 2005    CYSTOCELE REPAIR  1989    DILATION AND CURETTAGE OF UTERUS  yrs ago    DOPPLER ECHOCARDIOGRAPHY  2007    EYE SURGERY      bilat catarats    HYSTERECTOMY (CERVIX STATUS UNKNOWN)  4/1974    IR CHOLECYSTOSTOMY PERCUTANEOUS COMPLETE  9/29/2022    IR CHOLECYSTOSTOMY PERCUTANEOUS COMPLETE    JOINT REPLACEMENT      left hip    KNEE SURGERY  1991    L    KNEE SURGERY  1993    R    MOHS SURGERY  2/08    nose    OTHER SURGICAL HISTORY  12/22/06    epidural    RECTOCELE REPAIR  1989    SUTURE REMOVAL  june 2004    sutures in 3 fingers    TONSILLECTOMY      TOTAL HIP ARTHROPLASTY  2006    L     Family History   Problem Relation Age of Onset    Cancer Brother         Prostate    Cancer Mother         Stomach Cancer at age 80    Stroke Father     Cancer Paternal Grandmother         intraadbominal at age 80    Cancer Other         Leukemia age 70     Social History     Socioeconomic History    Marital status:      Spouse name: Leann Feng    Number of children: 2    Years of education: None    Highest education level: None   Occupational History     Employer: RETIRED   Tobacco Use    Smoking status: Never    Smokeless tobacco: Never   Vaping Use    Vaping Use: Never used   Substance and Sexual Activity    Alcohol use: Yes     Comment: very rare occasions    Drug use: No    Sexual activity: Yes     Partners: Male     Social Determinants of Health     Financial Resource Strain: Low Risk     Difficulty of Paying Living Expenses: Not very hard   Food Insecurity: No Food Insecurity    Worried About Running Out of Food in the Last Year: Never true    Ran Out of Food in the Last Year: Never true     Current Outpatient Medications   Medication Sig Dispense Refill    vitamin D (ERGOCALCIFEROL) 1.25 MG (09527 UT) CAPS capsule TAKE ONE CAPSULE BY MOUTH ONCE WEEKLY 12 capsule 2    vitamin C (ASCORBIC ACID) 500 MG tablet Take 1,000 mg by mouth daily      meclizine (ANTIVERT) 25 MG tablet Take 25 mg by mouth daily (with breakfast)      vitamin B-12 (CYANOCOBALAMIN) 1000 MCG tablet Take 1,000 mcg by mouth daily      Multiple Vitamins-Minerals (EYE VITAMINS PO) Take by mouth      pregabalin (LYRICA) 75 MG capsule Take 75 mg by mouth 2 times daily. pravastatin (PRAVACHOL) 20 MG tablet One po qd 90 tablet 0    metoprolol tartrate (LOPRESSOR) 25 MG tablet TAKE TWO TABLETS BY MOUTH TWICE A DAY (Patient taking differently: Take 12.5 mg by mouth 2 times daily 1/2 of the pill twice a day. The home nurse states that the medication needs to be even less than a half a pill twice a day. Patient took a 1/4 of the pill today.) 60 tablet 1    fluticasone (FLONASE) 50 MCG/ACT nasal spray USE 2 SPRAYS NASALLY DAILY 48 g 3    carbidopa-levodopa (SINEMET)  MG per tablet Take 2.5 tablets by mouth 3 times daily      pantoprazole (PROTONIX) 40 MG tablet Take 1 tablet by mouth daily 90 tablet 3    vitamin B-6 (PYRIDOXINE) 100 MG tablet Take 100 mg by mouth daily      vitamin E 400 UNIT capsule Take 400 Units by mouth daily       No current facility-administered medications for this visit. Allergies   Allergen Reactions    Lodine [Etodolac] Rash       Review of Systems   Constitutional:  Positive for fatigue. HENT: Negative. Eyes: Negative. Respiratory: Negative. Cardiovascular: Negative. Gastrointestinal: Negative. Genitourinary:  Positive for dysuria and frequency. Neurological:  Negative for dizziness, facial asymmetry and headaches. All other systems reviewed and are negative.     OBJECTIVE:  BP (!) 144/80 (Site: Left Upper Arm, Position: Sitting, Cuff Size: Medium Adult) Pulse 70   Temp 97.6 °F (36.4 °C)   Resp 14   Wt 195 lb (88.5 kg)   SpO2 97%   BMI 32.45 kg/m²     Physical Exam  Vitals reviewed. Constitutional:       Appearance: Normal appearance. HENT:      Head: Normocephalic. Eyes:      General: No scleral icterus. Conjunctiva/sclera: Conjunctivae normal.   Neck:      Thyroid: No thyromegaly. Vascular: No JVD. Cardiovascular:      Rate and Rhythm: Normal rate and regular rhythm. Heart sounds: Normal heart sounds. No murmur heard. No friction rub. No gallop. Pulmonary:      Effort: Pulmonary effort is normal.      Breath sounds: Normal breath sounds. No wheezing or rales. Abdominal:      General: Bowel sounds are normal. There is no distension. Palpations: Abdomen is soft. There is no mass. Tenderness: There is no abdominal tenderness. Hernia: No hernia is present. Lymphadenopathy:      Cervical: No cervical adenopathy. Skin:     Findings: No rash. Neurological:      Mental Status: She is alert and oriented to person, place, and time. ASSESSMENT:   Diagnosis Orders   1. Complicated UTI (urinary tract infection)  POCT Urinalysis no Micro    Culture, Urine    External Referral To Home Health      2. Primary hypertension  External Referral To Home Health      3. Cholecystitis  External Referral To Home Health      4. Chronic bilateral low back pain without sciatica  External Referral To Home Health      5.  Gallbladder disorder  External Referral To Home Health                PLAN:  See orders  Continue same medication  Added antibiotic  Urine will be sent for culture  Increase hydration  Will start home health care

## 2022-12-01 LAB
ORGANISM: ABNORMAL
URINE CULTURE, ROUTINE: ABNORMAL

## 2022-12-01 RX ORDER — SULFAMETHOXAZOLE AND TRIMETHOPRIM 800; 160 MG/1; MG/1
1 TABLET ORAL 2 TIMES DAILY
Qty: 10 TABLET | Refills: 0 | Status: SHIPPED | OUTPATIENT
Start: 2022-12-01 | End: 2022-12-06

## 2022-12-07 ENCOUNTER — TELEPHONE (OUTPATIENT)
Dept: PRIMARY CARE CLINIC | Age: 87
End: 2022-12-07
Payer: MEDICARE

## 2022-12-07 DIAGNOSIS — Z48.815 ENCNTR FOR SURGICAL AFTCR FOLLOWING SURGERY ON THE DGSTV SYS: Primary | ICD-10-CM

## 2022-12-07 PROCEDURE — G0180 MD CERTIFICATION HHA PATIENT: HCPCS | Performed by: FAMILY MEDICINE

## 2022-12-14 RX ORDER — TIZANIDINE 4 MG/1
TABLET ORAL
Qty: 60 TABLET | Refills: 0 | Status: SHIPPED | OUTPATIENT
Start: 2022-12-14

## 2022-12-14 NOTE — TELEPHONE ENCOUNTER
Medication:   Requested Prescriptions     Pending Prescriptions Disp Refills    tiZANidine (ZANAFLEX) 4 MG tablet 40 tablet 0     Last Filled:  9.20.22    Last appt: 11/29/2022   Next appt: Visit date not found    Last OARRS:   RX Monitoring 6/3/2019   Attestation The Prescription Monitoring Report for this patient was reviewed today.    Periodic Controlled Substance Monitoring -

## 2022-12-14 NOTE — TELEPHONE ENCOUNTER
Pt is requesting a refill. The muscle spasms are back worse than before.     tiZANidine (ZANAFLEX) 4 MG tablet    [facility] [id number] [place] [address]   Phone:  [phone]  Fax:  [phone]

## 2022-12-19 DIAGNOSIS — K21.9 GASTROESOPHAGEAL REFLUX DISEASE WITHOUT ESOPHAGITIS: Chronic | ICD-10-CM

## 2022-12-19 DIAGNOSIS — I10 ESSENTIAL HYPERTENSION: ICD-10-CM

## 2022-12-19 NOTE — TELEPHONE ENCOUNTER
Medication:   Requested Prescriptions     Pending Prescriptions Disp Refills    pantoprazole (PROTONIX) 40 MG tablet 90 tablet 0     Sig: Take 1 tablet by mouth daily    metoprolol tartrate (LOPRESSOR) 25 MG tablet 60 tablet 0     Sig: TAKE TWO TABLETS BY MOUTH TWICE A DAY    furosemide (LASIX) 20 MG tablet 90 tablet 0     Sig: Take 1 tablet by mouth daily        Last Filled:      Patient Phone Number: [phone] (home)     Last appt: 11/29/2022   Next appt: Visit date not found    Last OARRS:   RX Monitoring 6/3/2019   Attestation The Prescription Monitoring Report for this patient was reviewed today.    Periodic Controlled Substance Monitoring -       Preferred Pharmacy:   [place] [address]  [place] [address]  Phone: [phone] Fax: [phone] [place] [id number] [phone] - F [phone]  [address] [place] [address]  Phone: [phone] Fax: [phone]

## 2022-12-19 NOTE — TELEPHONE ENCOUNTER
Check with pt or care giver about the exact dose of Rx  & according to message Lasix was d/c ?   Thanks

## 2022-12-19 NOTE — TELEPHONE ENCOUNTER
Pt needs a meds refill.  Lov 11/29/22    furosemide (LASIX) 20 MG tablet  Pharmacy    pantoprazole (PROTONIX) 40 MG tablet    metoprolol tartrate (LOPRESSOR) 25 MG tablet    [facility] [id number] [address] [place] [address]   [address] [place] [address]   Phone:  [phone]  Fax:  [phone]

## 2022-12-20 RX ORDER — PANTOPRAZOLE SODIUM 40 MG/1
40 TABLET, DELAYED RELEASE ORAL DAILY
Qty: 90 TABLET | Refills: 0 | Status: SHIPPED | OUTPATIENT
Start: 2022-12-20

## 2022-12-20 RX ORDER — FUROSEMIDE 20 MG/1
20 TABLET ORAL DAILY
Qty: 90 TABLET | Refills: 0 | Status: SHIPPED | OUTPATIENT
Start: 2022-12-20

## 2022-12-20 NOTE — TELEPHONE ENCOUNTER
Medication:   Requested Prescriptions     Pending Prescriptions Disp Refills    pantoprazole (PROTONIX) 40 MG tablet 90 tablet 0     Sig: Take 1 tablet by mouth daily    metoprolol tartrate (LOPRESSOR) 25 MG tablet 60 tablet 0     Sig: TAKE TWO TABLETS BY MOUTH TWICE A DAY    furosemide (LASIX) 20 MG tablet 90 tablet 0     Sig: Take 1 tablet by mouth daily       Last Filled:      Patient Phone Number: [phone] (home)     Last appt: 11/29/2022   Next appt: Visit date not found    Last BMP:   Lab Results   Component Value Date/Time     10/12/2022 01:18 PM    K 4.6 10/12/2022 01:18 PM     10/12/2022 01:18 PM    CO2 27 10/12/2022 01:18 PM    ANIONGAP 12 10/12/2022 01:18 PM    GLUCOSE 99 10/12/2022 01:18 PM    GLUCOSE 97 05/03/2012 03:49 PM    BUN 12 10/12/2022 01:18 PM    CREATININE 0.7 10/12/2022 01:18 PM    LABGLOM >60 10/12/2022 01:18 PM    LABGLOM 66 05/03/2012 03:49 PM    LABGLOM 54 05/03/2012 03:49 PM    GFRAA >60 10/12/2022 01:18 PM    GFRAA >60 06/11/2013 02:18 AM    CALCIUM 9.7 10/12/2022 01:18 PM      Last CMP:   Lab Results   Component Value Date/Time     10/12/2022 01:18 PM    K 4.6 10/12/2022 01:18 PM     10/12/2022 01:18 PM    CO2 27 10/12/2022 01:18 PM    ANIONGAP 12 10/12/2022 01:18 PM    GLUCOSE 99 10/12/2022 01:18 PM    GLUCOSE 97 05/03/2012 03:49 PM    BUN 12 10/12/2022 01:18 PM    CREATININE 0.7 10/12/2022 01:18 PM    LABGLOM >60 10/12/2022 01:18 PM    LABGLOM 66 05/03/2012 03:49 PM    LABGLOM 54 05/03/2012 03:49 PM    GFRAA >60 10/12/2022 01:18 PM    GFRAA >60 06/11/2013 02:18 AM    PROT 6.2 10/12/2022 01:18 PM    PROT 6.9 12/04/2012 11:48 AM    LABALBU 3.9 10/12/2022 01:18 PM    LABALBU 3.8 07/20/2010 12:00 AM    AGRATIO 1.7 10/12/2022 01:18 PM    BILITOT 0.4 10/12/2022 01:18 PM    ALKPHOS 71 10/12/2022 01:18 PM    ALT 18 10/12/2022 01:18 PM    AST 37 10/12/2022 01:18 PM    GLOB 2.1 05/21/2015 02:58 PM     Last Renal Function:   Lab Results   Component Value Date/Time  10/12/2022 01:18 PM    K 4.6 10/12/2022 01:18 PM     10/12/2022 01:18 PM    CO2 27 10/12/2022 01:18 PM    GLUCOSE 99 10/12/2022 01:18 PM    GLUCOSE 97 05/03/2012 03:49 PM    BUN 12 10/12/2022 01:18 PM    CREATININE 0.7 10/12/2022 01:18 PM    PHOS 3.9 08/04/2021 01:45 PM    LABALBU 3.9 10/12/2022 01:18 PM    LABALBU 3.8 07/20/2010 12:00 AM    CALCIUM 9.7 10/12/2022 01:18 PM    GFR 74 10/19/2020 06:33 AM    GFR 64 10/19/2020 06:33 AM    GFRAA >60 10/12/2022 01:18 PM    GFRAA >60 06/11/2013 02:18 AM       Last OARRS:   RX Monitoring 6/3/2019   Attestation The Prescription Monitoring Report for this patient was reviewed today.    Periodic Controlled Substance Monitoring -       Preferred Pharmacy:   [facility] [id number] [place] [address] [address]  [place] [address]  Phone: [phone] Fax: [phone] [facility] [place] [phone] - F [phone]  [address] [address]  Phone: [phone] Fax: [phone]

## 2022-12-29 ENCOUNTER — OFFICE VISIT (OUTPATIENT)
Dept: PRIMARY CARE CLINIC | Age: 87
End: 2022-12-29

## 2022-12-29 VITALS
TEMPERATURE: 97 F | HEART RATE: 64 BPM | SYSTOLIC BLOOD PRESSURE: 128 MMHG | WEIGHT: 198.2 LBS | OXYGEN SATURATION: 95 % | DIASTOLIC BLOOD PRESSURE: 70 MMHG | BODY MASS INDEX: 32.98 KG/M2

## 2022-12-29 DIAGNOSIS — N39.0 COMPLICATED UTI (URINARY TRACT INFECTION): ICD-10-CM

## 2022-12-29 DIAGNOSIS — G89.29 CHRONIC BILATERAL LOW BACK PAIN WITHOUT SCIATICA: Primary | ICD-10-CM

## 2022-12-29 DIAGNOSIS — M54.50 CHRONIC BILATERAL LOW BACK PAIN WITHOUT SCIATICA: Primary | ICD-10-CM

## 2022-12-29 LAB
BILIRUBIN, POC: ABNORMAL
BLOOD URINE, POC: ABNORMAL
CLARITY, POC: CLEAR
COLOR, POC: ABNORMAL
GLUCOSE URINE, POC: ABNORMAL
KETONES, POC: ABNORMAL
LEUKOCYTE EST, POC: ABNORMAL
NITRITE, POC: POSITIVE
PH, POC: 5.5
PROTEIN, POC: ABNORMAL
SPECIFIC GRAVITY, POC: 1.02
UROBILINOGEN, POC: 0.2

## 2022-12-29 RX ORDER — NITROFURANTOIN 25; 75 MG/1; MG/1
100 CAPSULE ORAL 2 TIMES DAILY
Qty: 20 CAPSULE | Refills: 0 | Status: SHIPPED | OUTPATIENT
Start: 2022-12-29 | End: 2023-01-08

## 2022-12-29 ASSESSMENT — ENCOUNTER SYMPTOMS
RESPIRATORY NEGATIVE: 1
BACK PAIN: 1
EYES NEGATIVE: 1
GASTROINTESTINAL NEGATIVE: 1

## 2022-12-29 NOTE — PROGRESS NOTES
baSUBJECTIVE:  Patient ID: Ac Littlejohn is a 80 y.o. y.o. female     HPI   Patient presented today with her  concern about low back pain she had chronic back pain she has been seen by Ortho in the past usually muscle relaxant help which she does have a prescription at home it did help she got better, she did some physical therapy she is wanted to go back to therapy  Patient had diagnosed with cholecystitis had a tube placed recently was pulled did appointment to see surgery at  to get a CT scan to see if she needs surgery or another tube which hopefully she does not need either  Patient with concern about possible UTI she has recurrent frequent ones  Chills blood pressure in the lower abdomen with increased frequency no fever or chills  Past Medical History:   Diagnosis Date    Arthritis     Basal cell carcinoma of nose 09/2010    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    Carcinoma nos     Chronic back pain     GERD (gastroesophageal reflux disease)     Hypercholesterolemia     Hypertension     Major depressive disorder, recurrent, 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    Menopause age unsure    Non morbid obesity, unspecified obesity type 12/04/2018    Obstructive sleep apnea 08/05/2014    Postmenopausal hormone replacement therapy     Rectocele 1970s    Seasonal allergies     Sleep apnea 2010    Spinal stenosis of lumbar region     Gets epidural injections every 6 months      Past Surgical History:   Procedure Laterality Date    BREAST BIOPSY Right 5/3/13    biopsy    BREAST LUMPECTOMY  9/27/05    right lumpectomy    [address]  1/08    bilateral    COLONOSCOPY  2005    30, R7X 7X 2005    [address] [facility]  yrs ago    DOPPLER ECHOCARDIOGRAPHY  2007    EYE SURGERY      bilat catarats    HYSTERECTOMY (CERVIX STATUS UNKNOWN)  4/1974    IR CHOLECYSTOSTOMY PERCUTANEOUS COMPLETE  9/29/2022    IR CHOLECYSTOSTOMY PERCUTANEOUS COMPLETE    IR CHOLECYSTOSTOMY PERCUTANEOUS COMPLETE  12/2/2022    IR CHOLECYSTOSTOMY PERCUTANEOUS COMPLETE    JOINT REPLACEMENT      left hip    KNEE SURGERY  1991    L    KNEE SURGERY  1993    R    MOHS SURGERY  2/08    nose    OTHER SURGICAL HISTORY  12/22/06    epidural    RECTOCELE REPAIR  1989    SUTURE REMOVAL  june 2004    sutures in 3 fingers    TONSILLECTOMY      TOTAL HIP ARTHROPLASTY  2006    L     Family History   Problem Relation Age of Onset    Cancer Brother         Prostate    Cancer Mother         Stomach Cancer at age 80    Stroke Father     Cancer Paternal Grandmother         intraadbominal at age 80    Cancer Other         Leukemia age 70     Social History     Socioeconomic History    Marital status:      Spouse name: Stan Sears    Number of children: 2    Years of education: None    Highest education level: None   Occupational History     Employer: RETIRED   Tobacco Use    Smoking status: Never    Smokeless tobacco: Never   Vaping Use    Vaping Use: Never used   Substance and Sexual Activity    Alcohol use: Yes     Comment: very rare occasions    Drug use: No    Sexual activity: Yes     Partners: Male     Social Determinants of Health     Financial Resource Strain: Low Risk     Difficulty of Paying Living Expenses: Not very hard   Food Insecurity: No Food Insecurity    Worried About Running Out of Food in the Last Year: Never true    Ran Out of Food in the Last Year: Never true     Current Outpatient Medications   Medication Sig Dispense Refill    pantoprazole (PROTONIX) 40 MG tablet Take 1 tablet by mouth daily 90 tablet 0    metoprolol tartrate (LOPRESSOR) 25 MG tablet TAKE TWO TABLETS BY MOUTH TWICE A DAY (Patient taking differently: Patient only takes a half of a pill twice a day, as needed.) 60 tablet 0    furosemide (LASIX) 20 MG tablet Take 1 tablet by mouth daily 90 tablet 0    tiZANidine (ZANAFLEX) 4 MG tablet TAKE ONE TABLET BY MOUTH TWICE A DAY AS NEEDED FOR MUSCLE SPASMS OR PAIN 60 tablet 0    vitamin D (ERGOCALCIFEROL) 1.25 MG (71690 UT) CAPS capsule TAKE ONE CAPSULE BY MOUTH ONCE WEEKLY 12 capsule 2    vitamin C (ASCORBIC ACID) 500 MG tablet Take 1,000 mg by mouth daily      meclizine (ANTIVERT) 25 MG tablet Take 25 mg by mouth daily (with breakfast)      vitamin B-12 (CYANOCOBALAMIN) 1000 MCG tablet Take 1,000 mcg by mouth daily      Multiple Vitamins-Minerals (EYE VITAMINS PO) Take by mouth      pregabalin (LYRICA) 75 MG capsule Take 75 mg by mouth 2 times daily. pravastatin (PRAVACHOL) 20 MG tablet One po qd 90 tablet 0    fluticasone (FLONASE) 50 MCG/ACT nasal spray USE 2 SPRAYS NASALLY DAILY 48 g 3    carbidopa-levodopa (SINEMET)  MG per tablet Take 2.5 tablets by mouth 3 times daily      vitamin B-6 (PYRIDOXINE) 100 MG tablet Take 100 mg by mouth daily      vitamin E 400 UNIT capsule Take 400 Units by mouth daily       No current facility-administered medications for this visit. Allergies   Allergen Reactions    Lodine [Etodolac] Rash       Review of Systems   Constitutional: Negative. HENT: Negative. Eyes: Negative. Respiratory: Negative. Cardiovascular: Negative. Gastrointestinal: Negative. Genitourinary:  Positive for dysuria and frequency. Musculoskeletal:  Positive for back pain. All other systems reviewed and are negative. OBJECTIVE:  /70 (Site: Left Upper Arm, Position: Sitting, Cuff Size: Small Adult)   Pulse 64   Temp 97 °F (36.1 °C) (Temporal)   Wt 198 lb 3.2 oz (89.9 kg)   SpO2 95%   Breastfeeding No   BMI 32.98 kg/m²     Physical Exam  Vitals reviewed. Eyes:      General: No scleral icterus. Conjunctiva/sclera: Conjunctivae normal.   Neck:      Thyroid: No thyromegaly. Vascular: No JVD. Cardiovascular:      Rate and Rhythm: Normal rate and regular rhythm. Heart sounds: Normal heart sounds. No murmur heard. No friction rub. No gallop.    Pulmonary:      Effort: Pulmonary effort is normal.      Breath sounds: Normal breath sounds. No wheezing or rales. Abdominal:      General: Bowel sounds are normal. There is no distension. Palpations: Abdomen is soft. There is no mass. Tenderness: There is no abdominal tenderness. Hernia: No hernia is present. Lymphadenopathy:      Cervical: No cervical adenopathy. Skin:     Findings: No rash. Neurological:      Mental Status: She is alert and oriented to person, place, and time. ASSESSMENT:     Diagnosis Orders   1. Chronic bilateral low back pain without sciatica  Ambulatory referral to Physical Therapy    Culture, Urine    POCT Urinalysis no Micro      2.  Complicated UTI (urinary tract infection)              PLAN:  See orders  Continue with muscle relaxant  Resume PT  Urine will be sent for culture  Start antibiotic increase hydration

## 2022-12-31 LAB
ORGANISM: ABNORMAL
URINE CULTURE, ROUTINE: ABNORMAL

## 2023-01-16 RX ORDER — TIZANIDINE 4 MG/1
TABLET ORAL
Qty: 60 TABLET | Refills: 0 | Status: SHIPPED | OUTPATIENT
Start: 2023-01-16

## 2023-01-16 RX ORDER — PRAVASTATIN SODIUM 20 MG
TABLET ORAL
Qty: 90 TABLET | Refills: 0 | Status: SHIPPED | OUTPATIENT
Start: 2023-01-16

## 2023-01-16 NOTE — TELEPHONE ENCOUNTER
Medication Refills Requested  1) tiZANidine (ZANAFLEX) 4 MG tablet  *Patient out of  medication    2) pravastatin (PRAVACHOL) 20 MG tablet    Last OV 12/29/2022    Please send to:  [facility] [id number] [facility], [address] [facility] [phone] - F [phone]

## 2023-01-16 NOTE — TELEPHONE ENCOUNTER
Medication:   Requested Prescriptions     Pending Prescriptions Disp Refills    tiZANidine (ZANAFLEX) 4 MG tablet 60 tablet 0     Sig: TAKE ONE TABLET BY MOUTH TWICE A DAY AS NEEDED FOR MUSCLE SPASMS OR PAIN    pravastatin (PRAVACHOL) 20 MG tablet 90 tablet 0     Sig: One po qd     Last Filled:  12.14.22 11.2.22    Last appt: 12/29/2022   Next appt: Visit date not found    Last OARRS:   RX Monitoring 6/3/2019   Attestation The Prescription Monitoring Report for this patient was reviewed today.    Periodic Controlled Substance Monitoring -

## 2023-01-20 ENCOUNTER — TELEPHONE (OUTPATIENT)
Dept: PRIMARY CARE CLINIC | Age: 88
End: 2023-01-20

## 2023-01-20 RX ORDER — NITROFURANTOIN 25; 75 MG/1; MG/1
100 CAPSULE ORAL 2 TIMES DAILY
Qty: 20 CAPSULE | Refills: 0 | Status: SHIPPED | OUTPATIENT
Start: 2023-01-20 | End: 2023-01-30

## 2023-01-31 ENCOUNTER — TELEPHONE (OUTPATIENT)
Dept: PRIMARY CARE CLINIC | Age: 88
End: 2023-01-31
Payer: MEDICARE

## 2023-01-31 DIAGNOSIS — Z48.815 ENCOUNTER FOR SURGICAL AFTERCARE FOLLOWING SURGERY OF DIGESTIVE SYSTEM: Primary | ICD-10-CM

## 2023-01-31 PROCEDURE — G0180 MD CERTIFICATION HHA PATIENT: HCPCS | Performed by: FAMILY MEDICINE

## 2023-02-03 ENCOUNTER — TELEPHONE (OUTPATIENT)
Dept: PRIMARY CARE CLINIC | Age: 88
End: 2023-02-03

## 2023-02-03 NOTE — TELEPHONE ENCOUNTER
Merced Bean from [facility] called regarding referral, she states it should be for skilled nursing and she was calling to make sure she doesn't have other home care     Please follow up    [phone]

## 2023-02-03 NOTE — TELEPHONE ENCOUNTER
Spoke with Junior Byrne from  [facility] she said the patient has to have PT through them and skilled care. Spoke with patient, she said she wants PT done at [facility] and that she does not want home health right now.

## 2023-02-14 RX ORDER — TIZANIDINE 4 MG/1
TABLET ORAL
Qty: 60 TABLET | Refills: 0 | Status: SHIPPED | OUTPATIENT
Start: 2023-02-14

## 2023-02-14 NOTE — TELEPHONE ENCOUNTER
Medication:   Requested Prescriptions     Pending Prescriptions Disp Refills    tiZANidine (ZANAFLEX) 4 MG tablet [Pharmacy Med Name: tiZANidine HCl 4 MG Oral Tablet] 60 tablet 0     Sig: TAKE 1 TABLET BY MOUTH TWICE DAILY AS NEEDED FOR MUSCLE SPASM OR  PAIN        Last Filled:  1/16/2023    Patient Phone Number: [phone] (home)     Last appt: 12/29/2022   Next appt: Visit date not found    Last OARRS:   RX Monitoring 6/3/2019   Attestation The Prescription Monitoring Report for this patient was reviewed today.    Periodic Controlled Substance Monitoring -       Pended 30 days

## 2023-02-16 DIAGNOSIS — M54.50 CHRONIC BILATERAL LOW BACK PAIN WITHOUT SCIATICA: Primary | ICD-10-CM

## 2023-02-16 DIAGNOSIS — G89.29 CHRONIC BILATERAL LOW BACK PAIN WITHOUT SCIATICA: Primary | ICD-10-CM

## 2023-02-16 NOTE — TELEPHONE ENCOUNTER
Pt needs refill of lyrica 75mg bid [facility] [phone]      Please call pt when sent in [phone]

## 2023-02-17 NOTE — TELEPHONE ENCOUNTER
Patient has not been taking this medication for a while  Can you please check with the patient or the pharmacy?

## 2023-02-17 NOTE — TELEPHONE ENCOUNTER
Medication:   Requested Prescriptions     Pending Prescriptions Disp Refills    pregabalin (LYRICA) 75 MG capsule 240 capsule 0     Sig: Take 1 capsule by mouth 2 times daily for 90 days. Max Daily Amount: 150 mg        Last Filled:  ???    Patient Phone Number: [phone] (home)     Last appt: 12/29/2022   Next appt: Visit date not found    Last OARRS:   RX Monitoring 6/3/2019   Attestation The Prescription Monitoring Report for this patient was reviewed today.    Periodic Controlled Substance Monitoring -

## 2023-02-20 ENCOUNTER — TELEPHONE (OUTPATIENT)
Dept: PRIMARY CARE CLINIC | Age: 88
End: 2023-02-20

## 2023-02-20 DIAGNOSIS — M54.50 CHRONIC BILATERAL LOW BACK PAIN WITHOUT SCIATICA: Primary | ICD-10-CM

## 2023-02-20 DIAGNOSIS — G89.29 CHRONIC BILATERAL LOW BACK PAIN WITHOUT SCIATICA: Primary | ICD-10-CM

## 2023-02-20 NOTE — TELEPHONE ENCOUNTER
----- Message from Helena Arguelles MA sent at 2/20/2023  1:54 PM EST -----  Subject: Message to Provider    QUESTIONS  Information for Provider? Wants to go to [facility]. She   does not want Home health care. Please place order. Is requesting   Therapists [facility]. Please call patient.   ---------------------------------------------------------------------------  --------------  Chad NIÑO  [phone]; OK to leave message on voicemail  ---------------------------------------------------------------------------  --------------  SCRIPT ANSWERS  Relationship to Patient?  Self

## 2023-02-21 RX ORDER — PREGABALIN 75 MG/1
75 CAPSULE ORAL 2 TIMES DAILY
Qty: 240 CAPSULE | Refills: 0 | Status: SHIPPED | OUTPATIENT
Start: 2023-02-21 | End: 2023-05-22

## 2023-02-21 NOTE — TELEPHONE ENCOUNTER
Pt called and stated she is taking this medication.  And she is also looking for     Pt is asking for a refill on     Lyrica 75 Mg     metoprolol tartrate (LOPRESSOR) 25 MG tablet    [facility] [address] [place] [address] Ave - P [phone] - F [phone]

## 2023-02-21 NOTE — TELEPHONE ENCOUNTER
Called [facility] [phone].   Need fax # to send referral for PT to [facility]

## 2023-02-23 ENCOUNTER — TELEPHONE (OUTPATIENT)
Dept: PRIMARY CARE CLINIC | Age: 88
End: 2023-02-23

## 2023-02-23 NOTE — TELEPHONE ENCOUNTER
----- Message from Rula Jennifer sent at 2/23/2023  3:02 PM EST -----  Subject: Message to Provider    QUESTIONS  Information for Provider? pt will need her PT orders faxed to [facility] .fax number is [phone]   ---------------------------------------------------------------------------  --------------  [employer] [employer]  [phone]; OK to leave message on voicemail  ---------------------------------------------------------------------------  --------------  SCRIPT ANSWERS  Relationship to Patient?  Self

## 2023-02-24 NOTE — TELEPHONE ENCOUNTER
Medication:   Requested Prescriptions     Pending Prescriptions Disp Refills    metoprolol tartrate (LOPRESSOR) 25 MG tablet 60 tablet 0     Sig: TAKE TWO TABLETS BY MOUTH TWICE A DAY       Last Filled:  02/21/23    Patient Phone Number: [phone] (home)     Last appt: 12/29/2022   Next appt: Visit date not found    Last BMP:   Lab Results   Component Value Date/Time     10/12/2022 01:18 PM    K 4.6 10/12/2022 01:18 PM     10/12/2022 01:18 PM    CO2 27 10/12/2022 01:18 PM    ANIONGAP 12 10/12/2022 01:18 PM    GLUCOSE 99 10/12/2022 01:18 PM    GLUCOSE 97 05/03/2012 03:49 PM    BUN 12 10/12/2022 01:18 PM    CREATININE 0.7 10/12/2022 01:18 PM    LABGLOM >60 10/12/2022 01:18 PM    LABGLOM 66 05/03/2012 03:49 PM    LABGLOM 54 05/03/2012 03:49 PM    GFRAA >60 10/12/2022 01:18 PM    GFRAA >60 06/11/2013 02:18 AM    CALCIUM 9.7 10/12/2022 01:18 PM      Last CMP:   Lab Results   Component Value Date/Time     10/12/2022 01:18 PM    K 4.6 10/12/2022 01:18 PM     10/12/2022 01:18 PM    CO2 27 10/12/2022 01:18 PM    ANIONGAP 12 10/12/2022 01:18 PM    GLUCOSE 99 10/12/2022 01:18 PM    GLUCOSE 97 05/03/2012 03:49 PM    BUN 12 10/12/2022 01:18 PM    CREATININE 0.7 10/12/2022 01:18 PM    LABGLOM >60 10/12/2022 01:18 PM    LABGLOM 66 05/03/2012 03:49 PM    LABGLOM 54 05/03/2012 03:49 PM    GFRAA >60 10/12/2022 01:18 PM    GFRAA >60 06/11/2013 02:18 AM    PROT 6.2 10/12/2022 01:18 PM    PROT 6.9 12/04/2012 11:48 AM    LABALBU 3.9 10/12/2022 01:18 PM    LABALBU 3.8 07/20/2010 12:00 AM    AGRATIO 1.7 10/12/2022 01:18 PM    BILITOT 0.4 10/12/2022 01:18 PM    ALKPHOS 71 10/12/2022 01:18 PM    ALT 18 10/12/2022 01:18 PM    AST 37 10/12/2022 01:18 PM    GLOB 2.1 05/21/2015 02:58 PM     Last Renal Function:   Lab Results   Component Value Date/Time     10/12/2022 01:18 PM    K 4.6 10/12/2022 01:18 PM     10/12/2022 01:18 PM    CO2 27 10/12/2022 01:18 PM    GLUCOSE 99 10/12/2022 01:18 PM    GLUCOSE 97 05/03/2012 03:49 PM    BUN 12 10/12/2022 01:18 PM    CREATININE 0.7 10/12/2022 01:18 PM    PHOS 3.9 08/04/2021 01:45 PM    LABALBU 3.9 10/12/2022 01:18 PM    LABALBU 3.8 07/20/2010 12:00 AM    CALCIUM 9.7 10/12/2022 01:18 PM    GFR 74 10/19/2020 06:33 AM    GFR 64 10/19/2020 06:33 AM    GFRAA >60 10/12/2022 01:18 PM    GFRAA >60 06/11/2013 02:18 AM       Last OARRS:   RX Monitoring 6/3/2019   Attestation The Prescription Monitoring Report for this patient was reviewed today.    Periodic Controlled Substance Monitoring -       Preferred Pharmacy:   [facility], [address] [address]  Phone: [phone] Fax: [address] [phone] - F [phone]  [address] [address]  Phone: [phone] Fax: [phone]

## 2023-02-24 NOTE — TELEPHONE ENCOUNTER
[facility] called requesting refill on       metoprolol tartrate (LOPRESSOR) 25 MG tablet      [facility] [address] [facility], [address] [place] [address] Ave - P [phone] - F [phone]

## 2023-02-27 ENCOUNTER — TELEPHONE (OUTPATIENT)
Dept: PRIMARY CARE CLINIC | Age: 88
End: 2023-02-27

## 2023-02-27 NOTE — TELEPHONE ENCOUNTER
Medication:   Requested Prescriptions     Pending Prescriptions Disp Refills    metoprolol tartrate (LOPRESSOR) 25 MG tablet 60 tablet 0     Sig: TAKE TWO TABLETS BY MOUTH TWICE A DAY     Last Filled:  2/24/2023    Last appt: 12/29/2022   Next appt: Visit date not found    Last Labs DM:   Lab Results   Component Value Date/Time    LABA1C 6.7 05/22/2020 03:12 PM     Last Lipid:   Lab Results   Component Value Date/Time    CHOL 167 08/12/2019 03:50 PM    TRIG 111 08/12/2019 03:50 PM    HDL 64 08/12/2019 03:50 PM    HDL 63 05/30/2012 11:19 AM    LDLCALC 81 08/12/2019 03:50 PM       Last Thyroid:   Lab Results   Component Value Date/Time    TSH 1.58 02/15/2022 02:33 PM    T4FREE 1.3 08/18/2014 11:15 AM

## 2023-03-08 ENCOUNTER — TELEPHONE (OUTPATIENT)
Dept: PRIMARY CARE CLINIC | Age: 88
End: 2023-03-08

## 2023-03-08 NOTE — TELEPHONE ENCOUNTER
----- Message from [facility] sent at 3/8/2023 12:03 PM EST -----  Subject: Medication Problem    Medication: metoprolol tartrate (LOPRESSOR) 25 MG tablet  Dosage: 25mg, 2tab daily  Ordering Provider: cathy    Question/Problem: PT states that the Rx indicates \"2 tabs 2xdaily\" as the   instructions. However, PT felt that the instructions per Provider should   be 1/2 tab 2xdaily. PT feels that she cannot break these current tablets   as there are white and small and will crumble. The pills are from a   different .       Pharmacy: Charolette Babinski [address], [address] [facility] [phone] Mo Tuttle [phone]    ---------------------------------------------------------------------------  --------------  Home Valentin INFO  [phone]; OK to leave message on voicemail  ---------------------------------------------------------------------------  --------------    SCRIPT ANSWERS  Relationship to Patient: Self

## 2023-03-09 NOTE — TELEPHONE ENCOUNTER
We can try to take 1 tablet once a day and monitor her blood pressure  Close follow-up in office  To bring all her medication and blood pressure cuff with her.

## 2023-03-10 ENCOUNTER — TELEPHONE (OUTPATIENT)
Dept: PRIMARY CARE CLINIC | Age: 88
End: 2023-03-10

## 2023-03-14 ENCOUNTER — OFFICE VISIT (OUTPATIENT)
Dept: PRIMARY CARE CLINIC | Age: 88
End: 2023-03-14
Payer: MEDICARE

## 2023-03-14 VITALS
BODY MASS INDEX: 33.95 KG/M2 | TEMPERATURE: 98.2 F | SYSTOLIC BLOOD PRESSURE: 126 MMHG | WEIGHT: 204 LBS | RESPIRATION RATE: 14 BRPM | HEART RATE: 64 BPM | DIASTOLIC BLOOD PRESSURE: 62 MMHG | OXYGEN SATURATION: 100 %

## 2023-03-14 DIAGNOSIS — I10 ESSENTIAL HYPERTENSION: Primary | ICD-10-CM

## 2023-03-14 DIAGNOSIS — I10 ESSENTIAL HYPERTENSION: ICD-10-CM

## 2023-03-14 DIAGNOSIS — G89.29 CHRONIC BILATERAL LOW BACK PAIN WITHOUT SCIATICA: ICD-10-CM

## 2023-03-14 DIAGNOSIS — M54.50 CHRONIC BILATERAL LOW BACK PAIN WITHOUT SCIATICA: ICD-10-CM

## 2023-03-14 DIAGNOSIS — R53.83 FATIGUE, UNSPECIFIED TYPE: ICD-10-CM

## 2023-03-14 LAB
ALBUMIN SERPL-MCNC: 4.1 G/DL (ref 3.4–5)
ALP SERPL-CCNC: 67 U/L (ref 40–129)
ALT SERPL-CCNC: 7 U/L (ref 10–40)
ANION GAP SERPL CALCULATED.3IONS-SCNC: 13 MMOL/L (ref 3–16)
AST SERPL-CCNC: 19 U/L (ref 15–37)
BILIRUB DIRECT SERPL-MCNC: <0.2 MG/DL (ref 0–0.3)
BILIRUB INDIRECT SERPL-MCNC: ABNORMAL MG/DL (ref 0–1)
BILIRUB SERPL-MCNC: 0.4 MG/DL (ref 0–1)
BILIRUBIN, POC: ABNORMAL
BLOOD URINE, POC: ABNORMAL
BUN SERPL-MCNC: 12 MG/DL (ref 7–20)
CALCIUM SERPL-MCNC: 9.2 MG/DL (ref 8.3–10.6)
CHLORIDE SERPL-SCNC: 103 MMOL/L (ref 99–110)
CLARITY, POC: CLEAR
CO2 SERPL-SCNC: 26 MMOL/L (ref 21–32)
COLOR, POC: ABNORMAL
CREAT SERPL-MCNC: 0.7 MG/DL (ref 0.6–1.2)
DEPRECATED RDW RBC AUTO: 14.7 % (ref 12.4–15.4)
GFR SERPLBLD CREATININE-BSD FMLA CKD-EPI: >60 ML/MIN/{1.73_M2}
GLUCOSE SERPL-MCNC: 162 MG/DL (ref 70–99)
GLUCOSE URINE, POC: ABNORMAL
HCT VFR BLD AUTO: 39.1 % (ref 36–48)
HGB BLD-MCNC: 12.9 G/DL (ref 12–16)
KETONES, POC: ABNORMAL
LEUKOCYTE EST, POC: ABNORMAL
MCH RBC QN AUTO: 31.7 PG (ref 26–34)
MCHC RBC AUTO-ENTMCNC: 33.1 G/DL (ref 31–36)
MCV RBC AUTO: 95.8 FL (ref 80–100)
NITRITE, POC: ABNORMAL
PH, POC: 6
PLATELET # BLD AUTO: 176 K/UL (ref 135–450)
PMV BLD AUTO: 9.9 FL (ref 5–10.5)
POTASSIUM SERPL-SCNC: 3.9 MMOL/L (ref 3.5–5.1)
PROT SERPL-MCNC: 6.2 G/DL (ref 6.4–8.2)
PROTEIN, POC: ABNORMAL
RBC # BLD AUTO: 4.09 M/UL (ref 4–5.2)
SODIUM SERPL-SCNC: 142 MMOL/L (ref 136–145)
SPECIFIC GRAVITY, POC: 1.02
UROBILINOGEN, POC: 0.2
WBC # BLD AUTO: 6.7 K/UL (ref 4–11)

## 2023-03-14 PROCEDURE — 81002 URINALYSIS NONAUTO W/O SCOPE: CPT | Performed by: FAMILY MEDICINE

## 2023-03-14 PROCEDURE — 99214 OFFICE O/P EST MOD 30 MIN: CPT | Performed by: FAMILY MEDICINE

## 2023-03-14 PROCEDURE — 1123F ACP DISCUSS/DSCN MKR DOCD: CPT | Performed by: FAMILY MEDICINE

## 2023-03-14 SDOH — ECONOMIC STABILITY: INCOME INSECURITY: HOW HARD IS IT FOR YOU TO PAY FOR THE VERY BASICS LIKE FOOD, HOUSING, MEDICAL CARE, AND HEATING?: NOT HARD AT ALL

## 2023-03-14 SDOH — ECONOMIC STABILITY: FOOD INSECURITY: WITHIN THE PAST 12 MONTHS, YOU WORRIED THAT YOUR FOOD WOULD RUN OUT BEFORE YOU GOT MONEY TO BUY MORE.: NEVER TRUE

## 2023-03-14 SDOH — ECONOMIC STABILITY: FOOD INSECURITY: WITHIN THE PAST 12 MONTHS, THE FOOD YOU BOUGHT JUST DIDN'T LAST AND YOU DIDN'T HAVE MONEY TO GET MORE.: NEVER TRUE

## 2023-03-14 ASSESSMENT — PATIENT HEALTH QUESTIONNAIRE - PHQ9
5. POOR APPETITE OR OVEREATING: 0
10. IF YOU CHECKED OFF ANY PROBLEMS, HOW DIFFICULT HAVE THESE PROBLEMS MADE IT FOR YOU TO DO YOUR WORK, TAKE CARE OF THINGS AT HOME, OR GET ALONG WITH OTHER PEOPLE: 0
SUM OF ALL RESPONSES TO PHQ QUESTIONS 1-9: 0
2. FEELING DOWN, DEPRESSED OR HOPELESS: 0
SUM OF ALL RESPONSES TO PHQ QUESTIONS 1-9: 0
SUM OF ALL RESPONSES TO PHQ QUESTIONS 1-9: 0
7. TROUBLE CONCENTRATING ON THINGS, SUCH AS READING THE NEWSPAPER OR WATCHING TELEVISION: 0
9. THOUGHTS THAT YOU WOULD BE BETTER OFF DEAD, OR OF HURTING YOURSELF: 0
6. FEELING BAD ABOUT YOURSELF - OR THAT YOU ARE A FAILURE OR HAVE LET YOURSELF OR YOUR FAMILY DOWN: 0
1. LITTLE INTEREST OR PLEASURE IN DOING THINGS: 0
8. MOVING OR SPEAKING SO SLOWLY THAT OTHER PEOPLE COULD HAVE NOTICED. OR THE OPPOSITE, BEING SO FIGETY OR RESTLESS THAT YOU HAVE BEEN MOVING AROUND A LOT MORE THAN USUAL: 0
SUM OF ALL RESPONSES TO PHQ9 QUESTIONS 1 & 2: 0
3. TROUBLE FALLING OR STAYING ASLEEP: 0
4. FEELING TIRED OR HAVING LITTLE ENERGY: 0
SUM OF ALL RESPONSES TO PHQ QUESTIONS 1-9: 0

## 2023-03-14 ASSESSMENT — ENCOUNTER SYMPTOMS
EYES NEGATIVE: 1
GASTROINTESTINAL NEGATIVE: 1
RESPIRATORY NEGATIVE: 1

## 2023-03-14 NOTE — PROGRESS NOTES
SUBJECTIVE:  Patient ID: Bobby Ormond is a 80 y.o. y.o. female       Hypertension: Patient here for follow-up of elevated blood pressure. She is not exercising and is adherent to low salt diet. Blood pressure is well controlled at home. Cardiac symptoms none. Patient denies chest pain, chest pressure/discomfort, claudication, dyspnea, exertional chest pressure/discomfort, fatigue, irregular heart beat, lower extremity edema, near-syncope, orthopnea, palpitations, paroxysmal nocturnal dyspnea and syncope. Cardiovascular risk factors: advanced age (older than 54 for men, 72 for women), dyslipidemia, hypertension and obesity (BMI >= 30 kg/m2). Use of agents associated with hypertension: none. History of target organ damage: none.   She brought her cuff with her which was comparable to ours  She does not like how she cut the metoprolol in half and she is worried if it is not accurate so we will change it today to once a day at nighttime, continue Lasix in the morning  Monitor blood pressure at home  Has been complaining being tired sleeping most of the time encouraged the patient to get more engaged in during the day of physical activities to get to the  or the [facility]    Blood pressure with her own monitor was close to ours  Past Medical History:   Diagnosis Date    Arthritis     Basal cell carcinoma of nose 09/2010    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    Carcinoma nos     Chronic back pain     GERD (gastroesophageal reflux disease)     Hypercholesterolemia     Hypertension     Major depressive disorder, recurrent, 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    Menopause age unsure    Non morbid obesity, unspecified obesity type 12/04/2018    Obstructive sleep apnea 08/05/2014    Postmenopausal hormone replacement therapy     Rectocele 1970s    Seasonal allergies     Sleep apnea 2010    Spinal stenosis of lumbar region     Gets epidural injections every 6 months      Past Surgical History:   Procedure Laterality Date    BREAST BIOPSY Right 5/3/13    biopsy    BREAST LUMPECTOMY  9/27/05    right lumpectomy    [address]  1/08    bilateral    COLONOSCOPY  2005    30, R7X 7X 2005    CYSTOCELE REPAIR  1989    DILATION AND CURETTAGE OF UTERUS  yrs ago    DOPPLER ECHOCARDIOGRAPHY  2007    EYE SURGERY      bilat catarats    HYSTERECTOMY (CERVIX STATUS UNKNOWN)  4/1974    IR CHOLECYSTOSTOMY PERCUTANEOUS COMPLETE  9/29/2022    IR CHOLECYSTOSTOMY PERCUTANEOUS COMPLETE    IR CHOLECYSTOSTOMY PERCUTANEOUS COMPLETE  12/2/2022    IR CHOLECYSTOSTOMY PERCUTANEOUS COMPLETE    JOINT REPLACEMENT      left hip    KNEE SURGERY  1991    L    KNEE SURGERY  1993    R    MOHS SURGERY  2/08    nose    OTHER SURGICAL HISTORY  12/22/06    epidural    RECTOCELE REPAIR  1989    SUTURE REMOVAL  june 2004    sutures in 3 fingers    TONSILLECTOMY      TOTAL HIP ARTHROPLASTY  2006    L     Family History   Problem Relation Age of Onset    Cancer Brother         Prostate    Cancer Mother         Stomach Cancer at age 80    Stroke Father     Cancer Paternal Grandmother         intraadbominal at age 80    Cancer Other         Leukemia age 70     Social History     Socioeconomic History    Marital status:      Spouse name: Stanley Hicks    Number of children: 2   Occupational History     Employer: RETIRED   Tobacco Use    Smoking status: Never    Smokeless tobacco: Never   Vaping Use    Vaping Use: Never used   Substance and Sexual Activity    Alcohol use: Yes     Comment: very rare occasions    Drug use: No    Sexual activity: Yes     Partners: Male     Social Determinants of Health     Financial Resource Strain: Low Risk     Difficulty of Paying Living Expenses: Not hard at all   Food Insecurity: No Food Insecurity    Worried About Running Out of Food in the Last Year: Never true    Ran Out of Food in the Last Year: Never true   Transportation Needs: No Transportation Needs    Lack of Transportation (Medical): No    Lack of Transportation (Non-Medical): No   Housing Stability: Unknown    Unstable Housing in the Last Year: No     Current Outpatient Medications   Medication Sig Dispense Refill    metoprolol tartrate (LOPRESSOR) 25 MG tablet Take 1 tablet by mouth at bedtime TAKE one tablet at night 30 tablet 3    pregabalin (LYRICA) 75 MG capsule Take 1 capsule by mouth 2 times daily for 90 days. Max Daily Amount: 150 mg 240 capsule 0    tiZANidine (ZANAFLEX) 4 MG tablet TAKE 1 TABLET BY MOUTH TWICE DAILY AS NEEDED FOR MUSCLE SPASM OR  PAIN 60 tablet 0    pravastatin (PRAVACHOL) 20 MG tablet One po qd 90 tablet 0    pantoprazole (PROTONIX) 40 MG tablet Take 1 tablet by mouth daily 90 tablet 0    furosemide (LASIX) 20 MG tablet Take 1 tablet by mouth daily 90 tablet 0    vitamin D (ERGOCALCIFEROL) 1.25 MG (23595 UT) CAPS capsule TAKE ONE CAPSULE BY MOUTH ONCE WEEKLY 12 capsule 2    vitamin C (ASCORBIC ACID) 500 MG tablet Take 1,000 mg by mouth daily      meclizine (ANTIVERT) 25 MG tablet Take 25 mg by mouth daily (with breakfast)      vitamin B-12 (CYANOCOBALAMIN) 1000 MCG tablet Take 1,000 mcg by mouth daily      Multiple Vitamins-Minerals (EYE VITAMINS PO) Take by mouth      fluticasone (FLONASE) 50 MCG/ACT nasal spray USE 2 SPRAYS NASALLY DAILY 48 g 3    carbidopa-levodopa (SINEMET)  MG per tablet Take 2.5 tablets by mouth 3 times daily      vitamin B-6 (PYRIDOXINE) 100 MG tablet Take 100 mg by mouth daily      vitamin E 400 UNIT capsule Take 400 Units by mouth daily       No current facility-administered medications for this visit. Allergies   Allergen Reactions    Lodine [Etodolac] Rash       Review of Systems   Constitutional:  Positive for fatigue. HENT: Negative. Eyes: Negative. Respiratory: Negative. Cardiovascular: Negative. Gastrointestinal: Negative. Neurological:  Negative for dizziness, facial asymmetry and headaches. All other systems reviewed and are negative.     OBJECTIVE:  /62 Comment: pts cuff Pulse 64   Temp 98.2 °F (36.8 °C) (Oral)   Resp 14   Wt 204 lb (92.5 kg)   SpO2 100%   BMI 33.95 kg/m²     Physical Exam  Vitals reviewed. Eyes:      General: No scleral icterus. Conjunctiva/sclera: Conjunctivae normal.   Neck:      Thyroid: No thyromegaly. Vascular: No JVD. Cardiovascular:      Rate and Rhythm: Normal rate and regular rhythm. Heart sounds: Normal heart sounds. No murmur heard. No friction rub. No gallop. Pulmonary:      Effort: Pulmonary effort is normal.      Breath sounds: Normal breath sounds. No wheezing or rales. Abdominal:      General: Bowel sounds are normal. There is no distension. Palpations: Abdomen is soft. There is no mass. Tenderness: There is no abdominal tenderness. Hernia: No hernia is present. Lymphadenopathy:      Cervical: No cervical adenopathy. Skin:     Findings: No rash. Neurological:      Mental Status: She is alert and oriented to person, place, and time. ASSESSMENT:   Diagnosis Orders   1. Essential hypertension  metoprolol tartrate (LOPRESSOR) 25 MG tablet    Basic Metabolic Panel    CBC    Hepatic Function Panel    Vitamin B12 & Folate    Vitamin D 25 Hydroxy      2. Chronic bilateral low back pain without sciatica  Culture, Urine      3.  Fatigue, unspecified type  metoprolol tartrate (LOPRESSOR) 25 MG tablet    Basic Metabolic Panel    CBC    Hepatic Function Panel    Vitamin B12 & Folate    Vitamin D 25 Hydroxy    POCT Urinalysis no Micro    Culture, Urine            PLAN:  See orders  Take metoprolol 25 once at night  Lasix in the morning  Close follow-up  Urine culture sent out  Increase hydration

## 2023-03-15 DIAGNOSIS — K21.9 GASTROESOPHAGEAL REFLUX DISEASE WITHOUT ESOPHAGITIS: Chronic | ICD-10-CM

## 2023-03-15 LAB
25(OH)D3 SERPL-MCNC: 42.6 NG/ML
FOLATE SERPL-MCNC: 3.91 NG/ML (ref 4.78–24.2)
VIT B12 SERPL-MCNC: >2000 PG/ML (ref 211–911)

## 2023-03-15 NOTE — TELEPHONE ENCOUNTER
Pt needs meds refill.  Lov 3/14/23    tiZANidine (ZANAFLEX) 4 MG tablet  pantoprazole (PROTONIX) 40 MG tablet  [address] [facility],[address] [place] [address] [phone] Josiah Gilford [phone]   [address] [place] [id number]   Phone:  [phone]  Fax:  [phone]

## 2023-03-15 NOTE — TELEPHONE ENCOUNTER
Medication:   Requested Prescriptions     Pending Prescriptions Disp Refills    tiZANidine (ZANAFLEX) 4 MG tablet 60 tablet 0     Sig: TAKE 1 TABLET BY MOUTH TWICE DAILY AS NEEDED FOR MUSCLE SPASM OR  PAIN    pantoprazole (PROTONIX) 40 MG tablet 90 tablet 0     Sig: Take 1 tablet by mouth daily     Last Filled:  12/20/2020 & 2/21/2023    Last appt: 3/14/2023   Next appt: Visit date not found

## 2023-03-16 DIAGNOSIS — R73.9 HYPERGLYCEMIA: Primary | ICD-10-CM

## 2023-03-16 RX ORDER — TIZANIDINE 4 MG/1
TABLET ORAL
Qty: 60 TABLET | Refills: 0 | Status: SHIPPED | OUTPATIENT
Start: 2023-03-16

## 2023-03-16 RX ORDER — PANTOPRAZOLE SODIUM 40 MG/1
40 TABLET, DELAYED RELEASE ORAL DAILY
Qty: 90 TABLET | Refills: 0 | Status: SHIPPED | OUTPATIENT
Start: 2023-03-16

## 2023-03-17 DIAGNOSIS — R73.9 HYPERGLYCEMIA: ICD-10-CM

## 2023-03-17 LAB
BACTERIA UR CULT: ABNORMAL
ORGANISM: ABNORMAL

## 2023-03-17 RX ORDER — SULFAMETHOXAZOLE AND TRIMETHOPRIM 800; 160 MG/1; MG/1
1 TABLET ORAL 2 TIMES DAILY
Qty: 10 TABLET | Refills: 0 | Status: SHIPPED | OUTPATIENT
Start: 2023-03-17 | End: 2023-03-22

## 2023-03-18 LAB
EST. AVERAGE GLUCOSE BLD GHB EST-MCNC: 125.5 MG/DL
HBA1C MFR BLD: 6 %

## 2023-03-21 ENCOUNTER — TELEPHONE (OUTPATIENT)
Dept: PRIMARY CARE CLINIC | Age: 88
End: 2023-03-21

## 2023-04-17 RX ORDER — TIZANIDINE 4 MG/1
TABLET ORAL
Qty: 60 TABLET | Refills: 0 | Status: SHIPPED | OUTPATIENT
Start: 2023-04-17

## 2023-04-17 NOTE — TELEPHONE ENCOUNTER
Medication:   Requested Prescriptions     Pending Prescriptions Disp Refills    tiZANidine (ZANAFLEX) 4 MG tablet [Pharmacy Med Name: tiZANidine HCl 4 MG Oral Tablet] 60 tablet 0     Sig: TAKE 1 TABLET BY MOUTH TWICE DAILY AS NEEDED FOR MUSCLE SPASM     Last Filled:  03/16/23    Last appt: 3/14/2023   Next appt: Visit date not found    Last OARRS:   RX Monitoring 6/3/2019   Attestation The Prescription Monitoring Report for this patient was reviewed today.    Periodic Controlled Substance Monitoring -

## 2023-04-18 DIAGNOSIS — I10 ESSENTIAL HYPERTENSION: ICD-10-CM

## 2023-04-18 RX ORDER — FUROSEMIDE 20 MG/1
20 TABLET ORAL DAILY
Qty: 90 TABLET | Refills: 0 | Status: SHIPPED | OUTPATIENT
Start: 2023-04-18

## 2023-04-18 NOTE — TELEPHONE ENCOUNTER
Medication:   Requested Prescriptions     Pending Prescriptions Disp Refills    furosemide (LASIX) 20 MG tablet 90 tablet 0     Sig: Take 1 tablet by mouth daily       Last Filled:      Patient Phone Number: [phone] (home)     Last appt: 3/14/2023   Next appt: Visit date not found    Last BMP:   Lab Results   Component Value Date/Time     03/14/2023 11:41 AM    K 3.9 03/14/2023 11:41 AM     03/14/2023 11:41 AM    CO2 26 03/14/2023 11:41 AM    ANIONGAP 13 03/14/2023 11:41 AM    GLUCOSE 162 03/14/2023 11:41 AM    GLUCOSE 97 05/03/2012 03:49 PM    BUN 12 03/14/2023 11:41 AM    CREATININE 0.7 03/14/2023 11:41 AM    LABGLOM >60 03/14/2023 11:41 AM    GFRAA >60 10/12/2022 01:18 PM    GFRAA >60 06/11/2013 02:18 AM    CALCIUM 9.2 03/14/2023 11:41 AM      Last CMP:   Lab Results   Component Value Date/Time     03/14/2023 11:41 AM    K 3.9 03/14/2023 11:41 AM     03/14/2023 11:41 AM    CO2 26 03/14/2023 11:41 AM    ANIONGAP 13 03/14/2023 11:41 AM    GLUCOSE 162 03/14/2023 11:41 AM    GLUCOSE 97 05/03/2012 03:49 PM    BUN 12 03/14/2023 11:41 AM    CREATININE 0.7 03/14/2023 11:41 AM    LABGLOM >60 03/14/2023 11:41 AM    GFRAA >60 10/12/2022 01:18 PM    GFRAA >60 06/11/2013 02:18 AM    PROT 6.2 03/14/2023 11:41 AM    PROT 6.9 12/04/2012 11:48 AM    LABALBU 4.1 03/14/2023 11:41 AM    LABALBU 3.8 07/20/2010 12:00 AM    AGRATIO 1.7 10/12/2022 01:18 PM    BILITOT 0.4 03/14/2023 11:41 AM    ALKPHOS 67 03/14/2023 11:41 AM    ALT 7 03/14/2023 11:41 AM    AST 19 03/14/2023 11:41 AM    GLOB 2.1 05/21/2015 02:58 PM     Last Renal Function:   Lab Results   Component Value Date/Time     03/14/2023 11:41 AM    K 3.9 03/14/2023 11:41 AM     03/14/2023 11:41 AM    CO2 26 03/14/2023 11:41 AM    GLUCOSE 162 03/14/2023 11:41 AM    GLUCOSE 97 05/03/2012 03:49 PM    BUN 12 03/14/2023 11:41 AM    CREATININE 0.7 03/14/2023 11:41 AM    PHOS 3.9 08/04/2021 01:45 PM    LABALBU 4.1 03/14/2023 11:41 AM    LABALBU

## 2023-04-18 NOTE — TELEPHONE ENCOUNTER
[facility] called and pt needs meds refill.  Lov 3/14/23    furosemide (LASIX) 20 MG tablet    [address] [address],[address] [place] [address] [place] [phone] Yolanda Connor [phone]   [address] [place] [address]   Phone:  [phone]  Fax:  [phone]

## 2023-04-18 NOTE — TELEPHONE ENCOUNTER
Medication:   Requested Prescriptions      No prescriptions requested or ordered in this encounter        Last Filled:      Patient Phone Number: [phone] (home)     Last appt: 3/14/2023   Next appt: Visit date not found    Last OARRS:   RX Monitoring 6/3/2019   Attestation The Prescription Monitoring Report for this patient was reviewed today.    Periodic Controlled Substance Monitoring -

## 2023-05-08 ENCOUNTER — TELEPHONE (OUTPATIENT)
Dept: PULMONOLOGY | Age: 88
End: 2023-05-08

## 2023-05-08 ENCOUNTER — OFFICE VISIT (OUTPATIENT)
Dept: PULMONOLOGY | Age: 88
End: 2023-05-08
Payer: MEDICARE

## 2023-05-08 VITALS
DIASTOLIC BLOOD PRESSURE: 61 MMHG | OXYGEN SATURATION: 97 % | SYSTOLIC BLOOD PRESSURE: 138 MMHG | WEIGHT: 197 LBS | HEART RATE: 84 BPM | HEIGHT: 65 IN | BODY MASS INDEX: 32.82 KG/M2

## 2023-05-08 DIAGNOSIS — G47.33 OBSTRUCTIVE SLEEP APNEA: Primary | Chronic | ICD-10-CM

## 2023-05-08 DIAGNOSIS — G47.33 OBSTRUCTIVE SLEEP APNEA: Chronic | ICD-10-CM

## 2023-05-08 DIAGNOSIS — I10 ESSENTIAL HYPERTENSION: Chronic | ICD-10-CM

## 2023-05-08 PROCEDURE — 99214 OFFICE O/P EST MOD 30 MIN: CPT | Performed by: INTERNAL MEDICINE

## 2023-05-08 PROCEDURE — 1123F ACP DISCUSS/DSCN MKR DOCD: CPT | Performed by: INTERNAL MEDICINE

## 2023-05-08 RX ORDER — PRAVASTATIN SODIUM 20 MG
TABLET ORAL
Qty: 90 TABLET | Refills: 0 | Status: SHIPPED | OUTPATIENT
Start: 2023-05-08

## 2023-05-08 RX ORDER — MODAFINIL 200 MG/1
200 TABLET ORAL EVERY MORNING
Qty: 90 TABLET | Refills: 0 | Status: SHIPPED | OUTPATIENT
Start: 2023-05-08 | End: 2023-05-09 | Stop reason: SDUPTHER

## 2023-05-08 ASSESSMENT — SLEEP AND FATIGUE QUESTIONNAIRES
HOW LIKELY ARE YOU TO NOD OFF OR FALL ASLEEP WHILE WATCHING TV: 0
HOW LIKELY ARE YOU TO NOD OFF OR FALL ASLEEP WHILE SITTING AND READING: 1
HOW LIKELY ARE YOU TO NOD OFF OR FALL ASLEEP WHILE LYING DOWN TO REST IN THE AFTERNOON WHEN CIRCUMSTANCES PERMIT: 0
HOW LIKELY ARE YOU TO NOD OFF OR FALL ASLEEP WHILE SITTING AND TALKING TO SOMEONE: 0
HOW LIKELY ARE YOU TO NOD OFF OR FALL ASLEEP WHILE SITTING INACTIVE IN A PUBLIC PLACE: 0
HOW LIKELY ARE YOU TO NOD OFF OR FALL ASLEEP WHEN YOU ARE A PASSENGER IN A CAR FOR AN HOUR WITHOUT A BREAK: 1
HOW LIKELY ARE YOU TO NOD OFF OR FALL ASLEEP IN A CAR, WHILE STOPPED FOR A FEW MINUTES IN TRAFFIC: 0
HOW LIKELY ARE YOU TO NOD OFF OR FALL ASLEEP WHILE SITTING QUIETLY AFTER LUNCH WITHOUT ALCOHOL: 1
ESS TOTAL SCORE: 3

## 2023-05-08 NOTE — TELEPHONE ENCOUNTER
Medication:   Requested Prescriptions     Pending Prescriptions Disp Refills    pravastatin (PRAVACHOL) 20 MG tablet [Pharmacy Med Name: Pravastatin Sodium 20 MG Oral Tablet] 90 tablet 0     Sig: Take 1 tablet by mouth once daily     Last Filled:  1/16/2023    Last appt: 3/14/2023   Next appt: Visit date not found    Last Lipid:   Lab Results   Component Value Date/Time    CHOL 167 08/12/2019 03:50 PM    TRIG 111 08/12/2019 03:50 PM    HDL 64 08/12/2019 03:50 PM    HDL 63 05/30/2012 11:19 AM    LDLCALC 81 08/12/2019 03:50 PM

## 2023-05-08 NOTE — TELEPHONE ENCOUNTER
Pt called in regards to her appt she had today. Pt states Dr. Indio Iverson sent her prescription to the [facility] and she needs it sent to the Pharmacy in [place] on [facility]. Please advise.     Ph. [phone]

## 2023-05-08 NOTE — PROGRESS NOTES
Khalida Dial         : 1934    Diagnosis: [x] NELLY (G47.33) [] CSA (G47.31) [] Apnea (G47.30)   Length of Need: [] 18 Months [x] 99 Months [] Other:    Machine (JOSEFA!): [x] Respironics Dream Station   2   Auto [x] ResMed AirSense     Auto S11 [] Other:     []  CPAP () [] Bilevel ()   Mode: [] Auto [] Spontaneous    Mode: [] Auto [] Spontaneous      Warranty replace machine, AHI of over 25 and showing very erratic data compared to her previous machine. Machine is not hitting max pressure despite an AHI that is elevated which shows machine error! Pmin-12  Pmax-20      Comfort Settings:   - Ramp Pressure: 5 cmH2O                                        - Ramp time: 15 min                                     -  Flex/EPR - 3 full time                                    - For ResMed Bilevel (TiMax-4 sec   TiMin- 0.2 sec)     Humidifier: [x] Heated ()        [x] Water chamber replacement ()/ 1 per 6 months        Mask:   [] Nasal () /1 per 3 months [x] Full Face () /1 per 3 months   [] Patient choice -Size and fit mask [x] Patient Choice - Size and fit mask   [] Dispense:  [] Dispense:    [] Headgear () / 1 per 3 months [x] Headgear () / 1 per 3 months   [] Replacement Nasal Cushion ()/2 per month [x] Interface Replacement ()/1 per month   [] Replacement Nasal Pillows ()/2 per month         Tubing: [x] Heated ()/1 per 3 months    [] Standard ()/1 per 3 months [] Other:           Filters: [x] Non-disposable ()/1 per 6 months     [x] Ultra-Fine, Disposable ()/2 per month        Miscellaneous: [] Chin Strap ()/ 1 per 6 months [] O2 bleed-in:       LPM   [] Oximetry on CPAP/Bilevel []  Other:    [x] Modem: ()         Start Order Date: 23    MEDICAL JUSTIFICATION:  I, the undersigned, certify that the above prescribed supplies are medically necessary for this patients wellbeing.   In my opinion, the supplies are both

## 2023-05-08 NOTE — PROGRESS NOTES
Ben Jaime MD  [facility] C[facility]  Sherleyjohanna Asif KASANDRA [address] [facility], [address] [facility]- [phone]   [address] [place] [address] [phone] [place] [id number]  [address] [address]  Dept: [phone]  Dept Fax: [phone]  Loc: [phone]      Assessment/Plan:      1. Obstructive sleep apnea  Assessment & Plan:  Chronic-not Stable: Reviewed and analyzed results of physiologic download from patient's machine and reviewed with patient. Supplies and parts as needed for her machine. These are medically necessary. Limit caffeine use after 3pm.     Medically necessary for machine to be warrantee replaced. This is her replacement machine from the recall but showing very erratic data with an AHI over 25 despite being at a higher pressure than her previous machine that showed an AHI of 6 and an average pressure of 16. There is minimal leak and a machine is not hitting maximum pressure despite an uncontrolled AHI which indicates machine error. We will continue with Provigil 200 mg with the patient is driving or needs to be more attentive during the daytime. She has been stable on that dose for several years and uses it as needed and knows she needs to be compliant with her machine to continue to use the Provigil effectively. Orders:  -     modafinil (PROVIGIL) 200 MG tablet; Take 1 tablet by mouth every morning for 90 days. , Disp-90 tablet, R-0Normal  2. Essential hypertension  Assessment & Plan:  Chronic- Stable. Discussed the importance of treating sleep apnea as part of the management of this disorder. Cont any meds per PCP and other physicians. Reviewed, analyzed, and documented physiologic data from patient's PAP machine. PDMP report reviewed.     This information was analyzed to assess complexity and medical decision

## 2023-05-08 NOTE — PROGRESS NOTES
Radha Christie         : 1934    Diagnosis: [x] NELLY (G47.33) [] CSA (G47.31) [] Apnea (G47.30)   Length of Need: [x] 18 Months [] 99 Months [] Other:    Machine (JOSEFA!): [] Respironics Dream Station      Auto [] ResMed AirSense     Auto [] Other:     []  CPAP () [] Bilevel ()   Mode: [] Auto [] Spontaneous    Mode: [] Auto [] Spontaneous              Comfort Settings:        Humidifier: [] Heated ()        [x] Water chamber replacement ()/ 1 per 6 months        Mask:   [] Nasal () /1 per 3 months [x] Full Face () /1 per 3 months   [] Patient choice -Size and fit mask [x] Patient Choice - Size and fit mask   [] Dispense:  [] Dispense:    [] Headgear () / 1 per 3 months [x] Headgear () / 1 per 3 months   [] Replacement Nasal Cushion ()/2 per month [x] Interface Replacement ()/1 per month   [] Replacement Nasal Pillows ()/2 per month         Tubing: [x] Heated ()/1 per 3 months    [] Standard ()/1 per 3 months [] Other:           Filters: [x] Non-disposable ()/1 per 6 months     [x] Ultra-Fine, Disposable ()/2 per month        Miscellaneous: [] Chin Strap ()/ 1 per 6 months [] O2 bleed-in:       LPM   [] Oximetry on CPAP/Bilevel []  Other:    [x] Modem: ()         Start Order Date: 23    MEDICAL JUSTIFICATION:  I, the undersigned, certify that the above prescribed supplies are medically necessary for this patients wellbeing. In my opinion, the supplies are both reasonable and necessary in reference to accepted standards of medicalpractice in treatment of this patients condition.     Bc Vazquez MD      NPI: [id number]       Order Signed Date: 23    Electronically signed by Bc Vazquez MD on 2023 at 2:01 PM    Radha Christie  1934  [address]  [phone] (home)   [phone] (mobile)      Insurance Info (confirm with patient if correct):  Payer/Plan Subscr  Sex

## 2023-05-08 NOTE — ASSESSMENT & PLAN NOTE
Chronic-not Stable: Reviewed and analyzed results of physiologic download from patient's machine and reviewed with patient. Supplies and parts as needed for her machine. These are medically necessary. Limit caffeine use after 3pm.     Medically necessary for machine to be warrantee replaced. This is her replacement machine from the recall but showing very erratic data with an AHI over 25 despite being at a higher pressure than her previous machine that showed an AHI of 6 and an average pressure of 16. There is minimal leak and a machine is not hitting maximum pressure despite an uncontrolled AHI which indicates machine error. We will continue with Provigil 200 mg with the patient is driving or needs to be more attentive during the daytime. She has been stable on that dose for several years and uses it as needed and knows she needs to be compliant with her machine to continue to use the Provigil effectively.

## 2023-05-09 RX ORDER — MODAFINIL 200 MG/1
200 TABLET ORAL EVERY MORNING
Qty: 90 TABLET | Refills: 0 | Status: SHIPPED | OUTPATIENT
Start: 2023-05-09 | End: 2023-08-07

## 2023-05-10 ENCOUNTER — TELEPHONE (OUTPATIENT)
Dept: PRIMARY CARE CLINIC | Age: 88
End: 2023-05-10

## 2023-05-12 NOTE — TELEPHONE ENCOUNTER
Spoke with spouse. Pt received new mask yesterday. Spouse stated that they were shown how to do everything when they were at the office and there are no problems at this time.

## 2023-05-22 ENCOUNTER — TELEPHONE (OUTPATIENT)
Dept: PULMONOLOGY | Age: 88
End: 2023-05-22

## 2023-05-22 RX ORDER — TIZANIDINE 4 MG/1
TABLET ORAL
Qty: 60 TABLET | Refills: 0 | Status: SHIPPED | OUTPATIENT
Start: 2023-05-22

## 2023-05-22 NOTE — TELEPHONE ENCOUNTER
Medication:   Requested Prescriptions     Pending Prescriptions Disp Refills    tiZANidine (ZANAFLEX) 4 MG tablet [Pharmacy Med Name: tiZANidine HCl 4 MG Oral Tablet] 60 tablet 0     Sig: TAKE 1 TABLET BY MOUTH TWICE DAILY AS NEEDED FOR MUSCLE SPASM     Last Filled:  4.17.23    Last appt: 3/14/2023   Next appt: Visit date not found    Last OARRS:   RX Monitoring 6/3/2019   Attestation The Prescription Monitoring Report for this patient was reviewed today.    Periodic Controlled Substance Monitoring -

## 2023-07-01 DIAGNOSIS — K21.9 GASTROESOPHAGEAL REFLUX DISEASE WITHOUT ESOPHAGITIS: Chronic | ICD-10-CM

## 2023-07-03 RX ORDER — PANTOPRAZOLE SODIUM 40 MG/1
TABLET, DELAYED RELEASE ORAL
Qty: 90 TABLET | Refills: 0 | Status: SHIPPED | OUTPATIENT
Start: 2023-07-03

## 2023-07-03 RX ORDER — TIZANIDINE 4 MG/1
TABLET ORAL
Qty: 60 TABLET | Refills: 0 | Status: SHIPPED | OUTPATIENT
Start: 2023-07-03

## 2023-07-10 ENCOUNTER — OFFICE VISIT (OUTPATIENT)
Dept: PRIMARY CARE CLINIC | Age: 88
End: 2023-07-10

## 2023-07-10 VITALS
WEIGHT: 196.6 LBS | OXYGEN SATURATION: 93 % | HEART RATE: 68 BPM | DIASTOLIC BLOOD PRESSURE: 68 MMHG | BODY MASS INDEX: 32.76 KG/M2 | HEIGHT: 65 IN | SYSTOLIC BLOOD PRESSURE: 124 MMHG

## 2023-07-10 DIAGNOSIS — D17.1 LIPOMA OF TORSO: ICD-10-CM

## 2023-07-10 DIAGNOSIS — N60.81 SEBACEOUS CYST OF SKIN OF RIGHT BREAST: Primary | ICD-10-CM

## 2023-07-10 DIAGNOSIS — I10 ESSENTIAL HYPERTENSION: ICD-10-CM

## 2023-07-10 DIAGNOSIS — Z98.890 H/O ORAL SURGERY: ICD-10-CM

## 2023-07-10 PROBLEM — F33.0 MAJOR DEPRESSIVE DISORDER, RECURRENT, MILD (HCC): Status: RESOLVED | Noted: 2022-02-15 | Resolved: 2023-07-10

## 2023-07-10 ASSESSMENT — ENCOUNTER SYMPTOMS
GASTROINTESTINAL NEGATIVE: 1
EYES NEGATIVE: 1
RESPIRATORY NEGATIVE: 1

## 2023-07-10 NOTE — PROGRESS NOTES
Right C5-C6 C6-C7 Facet Injection  with no sedation  (only valium)  scheduled for 3/9/23 with surgery center notified. .     Patient Educated to have . Valium called into [facility].
Chest:      Comments: The right breast right lower quadrant medially there is a small sebaceous cyst very soft movable superficial  Abdominal:      General: Bowel sounds are normal. There is no distension. Palpations: Abdomen is soft. There is no mass. Tenderness: There is no abdominal tenderness. Hernia: No hernia is present. Lymphadenopathy:      Cervical: No cervical adenopathy. Skin:     Findings: No rash. Comments: With a small lipoma about 3 to 4 mm size on the right side of the back movable soft no skin change   Neurological:      Mental Status: She is alert and oriented to person, place, and time. ASSESSMENT:     Diagnosis Orders   1. Sebaceous cyst of skin of right breast        2. Essential hypertension        3. Lipoma of torso        4.  H/O oral surgery            PLAN:  Ensure the patient about the benign nature of the lesion she is concerned about  Continue to monitor any changes let me know  Follow-up with the oral surgeon  Good control blood pressure  Continue the same

## 2023-07-12 ENCOUNTER — TELEPHONE (OUTPATIENT)
Dept: PULMONOLOGY | Age: 88
End: 2023-07-12

## 2023-07-12 NOTE — TELEPHONE ENCOUNTER
Pt called in stating that she had a note in her calender to schedule an appt for this month. I informed pt that she has one scheduled in November. Double checked to see if pt was okay with that. Pt verbalized understanding.

## 2023-07-14 ENCOUNTER — OFFICE VISIT (OUTPATIENT)
Dept: PRIMARY CARE CLINIC | Age: 88
End: 2023-07-14

## 2023-07-14 VITALS
TEMPERATURE: 97.3 F | DIASTOLIC BLOOD PRESSURE: 64 MMHG | SYSTOLIC BLOOD PRESSURE: 116 MMHG | OXYGEN SATURATION: 94 % | HEIGHT: 65 IN | RESPIRATION RATE: 16 BRPM | BODY MASS INDEX: 32.99 KG/M2 | HEART RATE: 66 BPM | WEIGHT: 198 LBS

## 2023-07-14 DIAGNOSIS — Z98.890 H/O ORAL SURGERY: ICD-10-CM

## 2023-07-14 DIAGNOSIS — R53.83 LOW ENERGY: ICD-10-CM

## 2023-07-14 DIAGNOSIS — I10 PRIMARY HYPERTENSION: Primary | ICD-10-CM

## 2023-07-14 RX ORDER — ACETAMINOPHEN 325 MG/1
TABLET ORAL
COMMUNITY
Start: 2023-06-30

## 2023-07-14 RX ORDER — TRAMADOL HYDROCHLORIDE 50 MG/1
TABLET ORAL
COMMUNITY
Start: 2023-06-30

## 2023-07-14 NOTE — PROGRESS NOTES
SUBJECTIVE:  Patient ID: Alexander Bridges is a 80 y.o. female. Chief Complaint:  Chief Complaint   Patient presents with    Other     Low blood pressure, stated most recent reading systolic was in the 14B    Dizziness       HPI  80year old Female 4 wheel walker   Recent surgery for Tongue cancer  Rx Tylenol & Rx Ultram   Surgery 6/29/2023 Dr Amato [place]   FU 7/24/2023  Monitor BP       Patient Active Problem List   Diagnosis    Hyperlipidemia    Essential hypertension    Hypercholesterolemia    Rectocele    Arthritis    Spinal stenosis of lumbar region    Numbness and tingling of both legs    GERD (gastroesophageal reflux disease)    Neuropathy    B12 deficiency    Spinal stenosis    Breast cancer in situ    Allergic rhinitis    Myxoma    Obstructive sleep apnea     Current Outpatient Medications   Medication Sig Dispense Refill    acetaminophen (TYLENOL) 325 MG tablet       traMADol (ULTRAM) 50 MG tablet       tiZANidine (ZANAFLEX) 4 MG tablet TAKE 1 TABLET BY MOUTH TWICE DAILY AS NEEDED FOR MUSCLE SPASM 60 tablet 0    pantoprazole (PROTONIX) 40 MG tablet Take 1 tablet by mouth once daily 90 tablet 0    modafinil (PROVIGIL) 200 MG tablet Take 1 tablet by mouth every morning for 90 days.  90 tablet 0    pravastatin (PRAVACHOL) 20 MG tablet Take 1 tablet by mouth once daily 90 tablet 0    metoprolol tartrate (LOPRESSOR) 25 MG tablet Take 1 tablet by mouth at bedtime TAKE one tablet at night 30 tablet 3    vitamin D (ERGOCALCIFEROL) 1.25 MG (44759 UT) CAPS capsule TAKE ONE CAPSULE BY MOUTH ONCE WEEKLY 12 capsule 2    vitamin C (ASCORBIC ACID) 500 MG tablet Take 2 tablets by mouth daily      meclizine (ANTIVERT) 25 MG tablet Take 1 tablet by mouth daily (with breakfast)      vitamin B-12 (CYANOCOBALAMIN) 1000 MCG tablet Take 1 tablet by mouth daily      Multiple Vitamins-Minerals (EYE VITAMINS PO) Take by mouth      fluticasone (FLONASE) 50 MCG/ACT nasal spray USE 2 SPRAYS NASALLY DAILY 48 g 3    carbidopa-levodopa (SINEMET)

## 2023-08-02 DIAGNOSIS — R53.83 FATIGUE, UNSPECIFIED TYPE: ICD-10-CM

## 2023-08-02 DIAGNOSIS — I10 ESSENTIAL HYPERTENSION: ICD-10-CM

## 2023-08-02 NOTE — TELEPHONE ENCOUNTER
[place] - [address]  [facility] [address]  Phone: [phone] Fax: [phone]  [place], [address] [place] [address] [phone] - f [phone]  [address] [place] [address]  Phone: [phone] Fax: [phone]

## 2023-08-03 ENCOUNTER — OFFICE VISIT (OUTPATIENT)
Dept: PRIMARY CARE CLINIC | Age: 88
End: 2023-08-03

## 2023-08-03 VITALS
HEART RATE: 70 BPM | OXYGEN SATURATION: 94 % | BODY MASS INDEX: 33.45 KG/M2 | WEIGHT: 201 LBS | RESPIRATION RATE: 12 BRPM | SYSTOLIC BLOOD PRESSURE: 116 MMHG | TEMPERATURE: 97.6 F | DIASTOLIC BLOOD PRESSURE: 78 MMHG

## 2023-08-03 DIAGNOSIS — C02.9 TONGUE CANCER (HCC): ICD-10-CM

## 2023-08-03 DIAGNOSIS — R93.89 ABNORMAL CT OF THE CHEST: Primary | ICD-10-CM

## 2023-08-03 DIAGNOSIS — I10 ESSENTIAL HYPERTENSION: ICD-10-CM

## 2023-08-03 RX ORDER — FUROSEMIDE 20 MG/1
TABLET ORAL
Qty: 90 TABLET | Refills: 0 | Status: SHIPPED | OUTPATIENT
Start: 2023-08-03

## 2023-08-03 RX ORDER — TIZANIDINE 4 MG/1
TABLET ORAL
Qty: 60 TABLET | Refills: 0 | Status: SHIPPED | OUTPATIENT
Start: 2023-08-03

## 2023-08-03 ASSESSMENT — ENCOUNTER SYMPTOMS
RESPIRATORY NEGATIVE: 1
BACK PAIN: 1
GASTROINTESTINAL NEGATIVE: 1
EYES NEGATIVE: 1

## 2023-08-03 NOTE — PROGRESS NOTES
SUBJECTIVE:  Patient ID: Teri Acosta is a 80 y.o. y.o. female     HPI   Patient presented with her  today for follow-up on recent ER visit, she is complaining of back pain and she had a mechanical fall in the bathtub few days prior to the ER visit on the 29th the pain was not bad at that time but it is got worse over time this with the emergency room patient with significant history of tongue cancer recently had surgery she is being followed by ENT  The way she describes her fall where she fell backward and directly struck her central back against the top today she feels better but she was told there is some abnormal spot on her lung that needs to be checked further  CT thoracic shows no acute fracture sclerotic lesion within the left fourth and the seventh rib subtle lytic component of the right also at the posterior 12th rib are nonspecific concerned about possible metastatic.   Past Medical History:   Diagnosis Date    Arthritis     Basal cell carcinoma of nose 09/2010    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    Carcinoma nos     Chronic back pain     GERD (gastroesophageal reflux disease)     Hypercholesterolemia     Hypertension     Major depressive disorder, recurrent, 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    Menopause age unsure    Non morbid obesity, unspecified obesity type 12/04/2018    Obstructive sleep apnea 08/05/2014    Postmenopausal hormone replacement therapy     Rectocele 1970s    Seasonal allergies     Sleep apnea 2010    Spinal stenosis of lumbar region     Gets epidural injections every 6 months      Past Surgical History:   Procedure Laterality Date    BREAST BIOPSY Right 5/3/13    biopsy    BREAST LUMPECTOMY  9/27/05    right lumpectomy    [address] [place] [address] Rd  1/08    bilateral    COLONOSCOPY  2005    30, R7X 7X 2005    CYSTOCELE REPAIR  1989    DILATION AND CURETTAGE OF UTERUS  yrs ago    DOPPLER ECHOCARDIOGRAPHY  2007    EYE SURGERY      bilat catarats    HYSTERECTOMY (CERVIX STATUS

## 2023-08-16 NOTE — TELEPHONE ENCOUNTER
Medication:   Requested Prescriptions     Pending Prescriptions Disp Refills    pravastatin (PRAVACHOL) 20 MG tablet [Pharmacy Med Name: Pravastatin Sodium 20 MG Oral Tablet] 90 tablet 0     Sig: Take 1 tablet by mouth once daily        Last Filled:      Patient Phone Number: [phone] (home)     Last appt: 8/3/2023   Next appt: Visit date not found    Last OARRS:   RX Monitoring 6/3/2019   Attestation The Prescription Monitoring Report for this patient was reviewed today. Periodic Controlled Substance Monitoring -       Preferred Pharmacy:   [facility] [address] [facility]  600 [facility] [address]  Phone: [phone] Fax: [phone]    [address] [place] [address] [phone] - f [phone]  [address] [place] 576 [facility] [address]  Phone: [phone] Fax: [phone]  Medication:   Requested Prescriptions     Pending Prescriptions Disp Refills    pravastatin (PRAVACHOL) 20 MG tablet [Pharmacy Med Name: Pravastatin Sodium 20 MG Oral Tablet] 90 tablet 0     Sig: Take 1 tablet by mouth once daily        Last Filled:      Patient Phone Number: [phone] (home)     Last appt: 8/3/2023   Next appt: Visit date not found    Last OARRS:   RX Monitoring 6/3/2019   Attestation The Prescription Monitoring Report for this patient was reviewed today.    Periodic Controlled Substance Monitoring -       Preferred Pharmacy:   [facility], [address] [facility]  254 [facility],[address] Floor  [facility] [address]  Phone: [phone] Fax: [phone], [address] [phone] - f [phone]  [address] ([facility] [address]  Phone: [phone] Fax: [phone]

## 2023-08-17 RX ORDER — PRAVASTATIN SODIUM 20 MG
TABLET ORAL
Qty: 90 TABLET | Refills: 0 | Status: SHIPPED | OUTPATIENT
Start: 2023-08-17

## 2023-08-21 RX ORDER — ERGOCALCIFEROL 1.25 MG/1
CAPSULE ORAL
Qty: 24 CAPSULE | Refills: 0 | Status: SHIPPED | OUTPATIENT
Start: 2023-08-21

## 2023-08-21 NOTE — TELEPHONE ENCOUNTER
Medication:   Requested Prescriptions     Pending Prescriptions Disp Refills    vitamin D (ERGOCALCIFEROL) 1.25 MG (04096 UT) CAPS capsule [Pharmacy Med Name: Vitamin D (Ergocalciferol) 1.25 MG (95735 UT) Oral Capsule] 24 capsule 0     Sig: Take 1 capsule by mouth once a week        Last Filled:      Patient Phone Number: [phone] (home)     Last appt: 8/3/2023   Next appt: Visit date not found    Last OARRS:   RX Monitoring 6/3/2019   Attestation The Prescription Monitoring Report for this patient was reviewed today. Periodic Controlled Substance Monitoring -       Preferred Pharmacy:   [facility], [address] [place] [address]  Phone: [phone] Fax: [phone] [phone] - f [phone]  [address] [address]  Phone: [phone] Fax: [phone]  Medication:   Requested Prescriptions     Pending Prescriptions Disp Refills    vitamin D (ERGOCALCIFEROL) 1.25 MG (05983 UT) CAPS capsule [Pharmacy Med Name: Vitamin D (Ergocalciferol) 1.25 MG (50532 UT) Oral Capsule] 24 capsule 0     Sig: Take 1 capsule by mouth once a week        Last Filled:      Patient Phone Number: [phone] (home)     Last appt: 8/3/2023   Next appt: Visit date not found    Last OARRS:   RX Monitoring 6/3/2019   Attestation The Prescription Monitoring Report for this patient was reviewed today.    Periodic Controlled Substance Monitoring -       Preferred Pharmacy:   [facility], 210 [facility] 1 [facility] 102 [facility]  [address] [place] [address]  Phone: [phone] Fax: [phone]    [address] [place] [address], [address] [place] [address] [phone] - f [phone]  [address] ([facility] [address]  Phone:

## 2023-08-22 DIAGNOSIS — I10 ESSENTIAL HYPERTENSION: ICD-10-CM

## 2023-08-22 DIAGNOSIS — R53.83 FATIGUE, UNSPECIFIED TYPE: ICD-10-CM

## 2023-08-22 NOTE — TELEPHONE ENCOUNTER
Medication:   Requested Prescriptions     Pending Prescriptions Disp Refills    metoprolol tartrate (LOPRESSOR) 25 MG tablet [Pharmacy Med Name: Metoprolol Tartrate 25 MG Oral Tablet] 30 tablet 0     Sig: TAKE 1 TABLET BY MOUTH AT BEDTIME     Last Filled:  8/3/23    Last appt: 8/3/2023   Next appt: Visit date not found    Last OARRS:   RX Monitoring 6/3/2019   Attestation The Prescription Monitoring Report for this patient was reviewed today.    Periodic Controlled Substance Monitoring -

## 2023-08-31 ENCOUNTER — OFFICE VISIT (OUTPATIENT)
Dept: PRIMARY CARE CLINIC | Age: 88
End: 2023-08-31

## 2023-08-31 VITALS
HEART RATE: 97 BPM | OXYGEN SATURATION: 97 % | DIASTOLIC BLOOD PRESSURE: 84 MMHG | SYSTOLIC BLOOD PRESSURE: 154 MMHG | HEIGHT: 65 IN | BODY MASS INDEX: 33.45 KG/M2

## 2023-08-31 DIAGNOSIS — N39.0 URINARY TRACT INFECTION WITHOUT HEMATURIA, SITE UNSPECIFIED: ICD-10-CM
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
## 2023-08-31 DIAGNOSIS — M54.50 ACUTE BILATERAL LOW BACK PAIN WITHOUT SCIATICA: Primary | ICD-10-CM

## 2023-08-31 LAB
BILIRUBIN, POC: NORMAL
BLOOD URINE, POC: NORMAL
CLARITY, POC: CLEAR
COLOR, POC: YELLOW
GLUCOSE URINE, POC: NORMAL
KETONES, POC: NORMAL
LEUKOCYTE EST, POC: NORMAL
NITRITE, POC: POSITIVE
PH, POC: 6
PROTEIN, POC: 30
SPECIFIC GRAVITY, POC: >=1.03
UROBILINOGEN, POC: 0.2

## 2023-08-31 RX ORDER — NITROFURANTOIN 25; 75 MG/1; MG/1
100 CAPSULE ORAL 2 TIMES DAILY
Qty: 20 CAPSULE | Refills: 0 | Status: SHIPPED | OUTPATIENT
Start: 2023-08-31 | End: 2023-09-10

## 2023-08-31 ASSESSMENT — ENCOUNTER SYMPTOMS
GASTROINTESTINAL NEGATIVE: 1
BACK PAIN: 1
EYES NEGATIVE: 1
RESPIRATORY NEGATIVE: 1

## 2023-09-01 ENCOUNTER — HOSPITAL ENCOUNTER (OUTPATIENT)
Dept: GENERAL RADIOLOGY | Age: 88
Discharge: HOME | End: 2023-09-01
Payer: MEDICARE

## 2023-09-01 DIAGNOSIS — M54.50 ACUTE BILATERAL LOW BACK PAIN WITHOUT SCIATICA: ICD-10-CM

## 2023-09-01 PROCEDURE — 72070 X-RAY EXAM THORAC SPINE 2VWS: CPT

## 2023-09-01 PROCEDURE — 72100 X-RAY EXAM L-S SPINE 2/3 VWS: CPT

## 2023-09-02 DIAGNOSIS — R53.83 FATIGUE, UNSPECIFIED TYPE: ICD-10-CM

## 2023-09-02 DIAGNOSIS — I10 ESSENTIAL HYPERTENSION: ICD-10-CM

## 2023-09-03 LAB
BACTERIA UR CULT: ABNORMAL
ORGANISM: ABNORMAL

## 2023-09-05 RX ORDER — TIZANIDINE 4 MG/1
TABLET ORAL
Qty: 60 TABLET | Refills: 0 | Status: SHIPPED | OUTPATIENT
Start: 2023-09-05

## 2023-09-05 RX ORDER — SULFAMETHOXAZOLE AND TRIMETHOPRIM 800; 160 MG/1; MG/1
1 TABLET ORAL 2 TIMES DAILY
Qty: 20 TABLET | Refills: 0 | Status: SHIPPED | OUTPATIENT
Start: 2023-09-05 | End: 2023-09-15

## 2023-09-05 NOTE — TELEPHONE ENCOUNTER
Medication:   Requested Prescriptions     Pending Prescriptions Disp Refills    metoprolol tartrate (LOPRESSOR) 25 MG tablet [Pharmacy Med Name: Metoprolol Tartrate 25 MG Oral Tablet] 30 tablet 0     Sig: TAKE 1 TABLET BY MOUTH AT BEDTIME    tiZANidine (ZANAFLEX) 4 MG tablet [Pharmacy Med Name: tiZANidine HCl 4 MG Oral Tablet] 60 tablet 0     Sig: TAKE 1 TABLET BY MOUTH TWICE DAILY AS NEEDED FOR MUSCLE SPASM     Last Filled:  08/03/2023    Last appt: 8/31/2023   Next appt: Visit date not found    Last OARRS:   RX Monitoring 6/3/2019   Attestation The Prescription Monitoring Report for this patient was reviewed today.    Periodic Controlled Substance Monitoring -

## 2023-09-06 ENCOUNTER — TELEPHONE (OUTPATIENT)
Dept: PRIMARY CARE CLINIC | Age: 88
End: 2023-09-06

## 2023-09-06 NOTE — TELEPHONE ENCOUNTER
----- Message from Rukhsana Osegeura sent at 9/6/2023 10:33 AM EDT -----  Subject: Results Request    QUESTIONS  Results: urine ; Ordered by: Anna Gomez   Date Performed: 2023-08-31  ---------------------------------------------------------------------------  --------------  [facility] INFO    [phone]; OK to leave message on voicemail  ---------------------------------------------------------------------------  --------------

## 2023-09-06 NOTE — TELEPHONE ENCOUNTER
----- Message from Halina Ayoub sent at 9/6/2023 10:34 AM EDT -----  Subject: Results Request    QUESTIONS  Results: back xrays;  Ordered by: Nida Gallegos Performed: 2023-09-01  ---------------------------------------------------------------------------  --------------  Shyla Kinney INFO    [phone]; OK to leave message on voicemail  ---------------------------------------------------------------------------  --------------

## 2023-09-12 ENCOUNTER — TELEPHONE (OUTPATIENT)
Dept: PRIMARY CARE CLINIC | Age: 88
End: 2023-09-12

## 2023-09-12 NOTE — TELEPHONE ENCOUNTER
Canelo Blackburn from [facility] called for the patient and stated that     Current diagnosis for the patient --back pain with sciatica is non reimbursable home health diagnosis    She stated that if the above diagnosis can change to Arthritis spinal stenosis lumbar region  that is reimbursable.     Please review and advice    Fax NM--[phone]  --[phone]    Thanks

## 2023-09-29 ENCOUNTER — TELEPHONE (OUTPATIENT)
Dept: PRIMARY CARE CLINIC | Age: 88
End: 2023-09-29

## 2023-09-29 NOTE — TELEPHONE ENCOUNTER
Informed  patient Erythromycin Counseling:  I discussed with the patient the risks of erythromycin including but not limited to GI upset, allergic reaction, drug rash, diarrhea, increase in liver enzymes, and yeast infections.

## 2023-09-29 NOTE — TELEPHONE ENCOUNTER
Patient requesting current list of medications   Patient will like to  in office  Please follow up with patient when ready for patient pick-up

## 2023-10-02 DIAGNOSIS — I10 ESSENTIAL HYPERTENSION: ICD-10-CM

## 2023-10-02 DIAGNOSIS — K21.9 GASTROESOPHAGEAL REFLUX DISEASE WITHOUT ESOPHAGITIS: Chronic | ICD-10-CM

## 2023-10-02 DIAGNOSIS — R53.83 FATIGUE, UNSPECIFIED TYPE: ICD-10-CM

## 2023-10-03 RX ORDER — FUROSEMIDE 20 MG/1
TABLET ORAL
Qty: 90 TABLET | Refills: 0 | Status: SHIPPED | OUTPATIENT
Start: 2023-10-03

## 2023-10-03 RX ORDER — TIZANIDINE 4 MG/1
TABLET ORAL
Qty: 60 TABLET | Refills: 0 | Status: SHIPPED | OUTPATIENT
Start: 2023-10-03

## 2023-10-03 RX ORDER — PANTOPRAZOLE SODIUM 40 MG/1
TABLET, DELAYED RELEASE ORAL
Qty: 90 TABLET | Refills: 0 | Status: SHIPPED | OUTPATIENT
Start: 2023-10-03

## 2023-10-03 RX ORDER — PRAVASTATIN SODIUM 20 MG
TABLET ORAL
Qty: 90 TABLET | Refills: 0 | Status: SHIPPED | OUTPATIENT
Start: 2023-10-03

## 2023-10-03 NOTE — TELEPHONE ENCOUNTER
Medication:   Requested Prescriptions     Pending Prescriptions Disp Refills    tiZANidine (ZANAFLEX) 4 MG tablet [Pharmacy Med Name: tiZANidine HCl 4 MG Oral Tablet] 60 tablet 0     Sig: TAKE 1 TABLET BY MOUTH TWICE DAILY AS NEEDED FOR MUSCLE SPASM    pravastatin (PRAVACHOL) 20 MG tablet [Pharmacy Med Name: Pravastatin Sodium 20 MG Oral Tablet] 90 tablet 0     Sig: Take 1 tablet by mouth once daily    furosemide (LASIX) 20 MG tablet [Pharmacy Med Name: Furosemide 20 MG Oral Tablet] 90 tablet 0     Sig: Take 1 tablet by mouth once daily    metoprolol tartrate (LOPRESSOR) 25 MG tablet [Pharmacy Med Name: Metoprolol Tartrate 25 MG Oral Tablet] 30 tablet 0     Sig: TAKE 1 TABLET BY MOUTH AT BEDTIME    pantoprazole (PROTONIX) 40 MG tablet [Pharmacy Med Name: Pantoprazole Sodium 40 MG Oral Tablet Delayed Release] 90 tablet 0     Sig: Take 1 tablet by mouth once daily     Last Filled:  8/2023    Last appt: 8/31/2023   Next appt: Visit date not found    Last Lipid:   Lab Results   Component Value Date/Time    CHOL 167 08/12/2019 03:50 PM    TRIG 111 08/12/2019 03:50 PM    HDL 64 08/12/2019 03:50 PM    HDL 63 05/30/2012 11:19 AM    LDLCALC 81 08/12/2019 03:50 PM

## 2023-10-13 ENCOUNTER — TELEPHONE (OUTPATIENT)
Dept: PRIMARY CARE CLINIC | Age: 88
End: 2023-10-13

## 2023-10-13 LAB
BILIRUBIN URINE: NEGATIVE
CLARITY: CLEAR
COLOR: NORMAL
ERYTHROCYTES URINE: NEGATIVE
GLUCOSE BLD-MCNC: 108 MG/DL (ref 70–100)
GLUCOSE URINE: NEGATIVE MG/DL
KETONES, URINE: NEGATIVE MG/DL
LEUKOCYTE ESTERASE, URINE: NEGATIVE
NITRITE, URINE: NEGATIVE
PH UA: 6 (ref 5–8)
PROTEIN UA: NEGATIVE MG/DL
SPECIFIC GRAVITY UA: 1.01 (ref 1–1.03)
UROBILINOGEN, URINE: <2 MG/DL (ref 0.2–1.9)

## 2023-10-13 NOTE — TELEPHONE ENCOUNTER
Murtaza Arreita from [facility] care called for the patient and stated that    Patient is not responding when spoken. Once spoken not able to respond. So she wanted to inform Dr Merced Thurston if she could advice some test or whatever needed.     Please review and advice    Call back no-- [phone]    Thanks

## 2023-10-13 NOTE — TELEPHONE ENCOUNTER
Spoke to Phil @ around [phone] she called the hospital and waited for ambulance to take pt to [address] and neuro evaluation was stable

## 2023-10-14 LAB
ALBUMIN SERPL-MCNC: 3.3 G/DL (ref 3.5–5.7)
ANION GAP SERPL CALCULATED.3IONS-SCNC: 7 MMOL/L (ref 3–16)
BASOPHILS ABSOLUTE: 43 /UL (ref 0–200)
BASOPHILS RELATIVE PERCENT: 0.7 % (ref 0–1)
BUN BLDV-MCNC: 12 MG/DL (ref 7–25)
CALCIUM SERPL-MCNC: 8.7 MG/DL (ref 8.6–10.3)
CHLORIDE BLD-SCNC: 106 MMOL/L (ref 98–110)
CO2: 29 MMOL/L (ref 21–33)
CREAT SERPL-MCNC: 0.83 MG/DL (ref 0.6–1.3)
EOSINOPHILS ABSOLUTE: 217 /UL (ref 15–500)
EOSINOPHILS RELATIVE PERCENT: 3.5 % (ref 0–8)
ESTIMATED GLOMERULAR FILTRATION RATE CREATININE EQUATION: 67
GLUCOSE BLD-MCNC: 143 MG/DL (ref 70–100)
HCT VFR BLD CALC: 38.3 % (ref 35–45)
HEMOGLOBIN: 13 G/DL (ref 11.7–15.5)
LYMPHOCYTES ABSOLUTE: 1327 /UL (ref 850–3900)
LYMPHOCYTES RELATIVE PERCENT: 21.4 % (ref 15–45)
MCH RBC QN AUTO: 31.7 PG (ref 27–33)
MCHC RBC AUTO-ENTMCNC: 33.9 G/DL (ref 32–36)
MCV RBC AUTO: 93.4 FL (ref 80–100)
MONOCYTES ABSOLUTE: 440 /UL (ref 200–950)
MONOCYTES RELATIVE PERCENT: 7.1 % (ref 0–12)
NEUTROPHILS ABSOLUTE: 4173 /UL (ref 1500–7800)
NUCLEATED RED BLOOD CELLS: 0 /100 WBC (ref 0–0)
OSMOLALITY CALCULATION: 296 MOSM/KG (ref 278–305)
PDW BLD-RTO: 14.2 % (ref 11–15)
PHOSPHORUS: 3.6 MG/DL (ref 2.1–4.5)
PLATELET # BLD: 149 10E3/UL (ref 140–400)
PMV BLD AUTO: 9.6 FL (ref 7.5–11.5)
POTASSIUM SERPL-SCNC: 4 MMOL/L (ref 3.5–5.3)
RBC # BLD: 4.1 10E6/UL (ref 3.8–5.1)
SEGMENTED NEUTROPHILS RELATIVE PERCENT: 67.3 % (ref 40–80)
SODIUM BLD-SCNC: 142 MMOL/L (ref 133–146)
WBC # BLD: 6.2 10E3/UL (ref 3.8–10.8)

## 2023-10-21 LAB — TOTAL CK: 153 U/L (ref 30–223)

## 2023-10-22 LAB
ALBUMIN SERPL-MCNC: 3.3 G/DL (ref 3.5–5.7)
ANION GAP SERPL CALCULATED.3IONS-SCNC: 8 MMOL/L (ref 3–16)
BASOPHILS ABSOLUTE: 34 /UL (ref 0–200)
BASOPHILS RELATIVE PERCENT: 0.7 % (ref 0–1)
BUN BLDV-MCNC: 9 MG/DL (ref 7–25)
CALCIUM SERPL-MCNC: 8.3 MG/DL (ref 8.6–10.3)
CHLORIDE BLD-SCNC: 104 MMOL/L (ref 98–110)
CO2: 25 MMOL/L (ref 21–33)
CREAT SERPL-MCNC: 0.77 MG/DL (ref 0.6–1.3)
EOSINOPHILS ABSOLUTE: 39 /UL (ref 15–500)
EOSINOPHILS RELATIVE PERCENT: 0.8 % (ref 0–8)
ESTIMATED GLOMERULAR FILTRATION RATE CREATININE EQUATION: 74
GLUCOSE BLD-MCNC: 117 MG/DL (ref 70–100)
HCT VFR BLD CALC: 38.7 % (ref 35–45)
HEMOGLOBIN: 13.3 G/DL (ref 11.7–15.5)
LYMPHOCYTES ABSOLUTE: 995 /UL (ref 850–3900)
LYMPHOCYTES RELATIVE PERCENT: 20.3 % (ref 15–45)
MCH RBC QN AUTO: 32.2 PG (ref 27–33)
MCHC RBC AUTO-ENTMCNC: 34.3 G/DL (ref 32–36)
MCV RBC AUTO: 93.9 FL (ref 80–100)
MONOCYTES ABSOLUTE: 848 /UL (ref 200–950)
MONOCYTES RELATIVE PERCENT: 17.3 % (ref 0–12)
NEUTROPHILS ABSOLUTE: 2984 /UL (ref 1500–7800)
NUCLEATED RED BLOOD CELLS: 0 /100 WBC (ref 0–0)
OSMOLALITY CALCULATION: 284 MOSM/KG (ref 278–305)
PDW BLD-RTO: 14.4 % (ref 11–15)
PHOSPHORUS: 3.7 MG/DL (ref 2.1–4.5)
PLATELET # BLD: 146 10E3/UL (ref 140–400)
PMV BLD AUTO: 9.6 FL (ref 7.5–11.5)
POTASSIUM SERPL-SCNC: 3.3 MMOL/L (ref 3.5–5.3)
RBC # BLD: 4.12 10E6/UL (ref 3.8–5.1)
SEGMENTED NEUTROPHILS RELATIVE PERCENT: 60.9 % (ref 40–80)
SODIUM BLD-SCNC: 137 MMOL/L (ref 133–146)
WBC # BLD: 4.9 10E3/UL (ref 3.8–10.8)

## 2023-10-23 LAB
ALBUMIN SERPL-MCNC: 3.1 G/DL (ref 3.5–5.7)
ANION GAP SERPL CALCULATED.3IONS-SCNC: 7 MMOL/L (ref 3–16)
BUN BLDV-MCNC: 12 MG/DL (ref 7–25)
CALCIUM SERPL-MCNC: 8.2 MG/DL (ref 8.6–10.3)
CHLORIDE BLD-SCNC: 107 MMOL/L (ref 98–110)
CO2: 24 MMOL/L (ref 21–33)
CREAT SERPL-MCNC: 0.79 MG/DL (ref 0.6–1.3)
ESTIMATED GLOMERULAR FILTRATION RATE CREATININE EQUATION: 71
GLUCOSE BLD-MCNC: 120 MG/DL (ref 70–100)
HCT VFR BLD CALC: 38.3 % (ref 35–45)
HEMOGLOBIN: 13.1 G/DL (ref 11.7–15.5)
MCH RBC QN AUTO: 32.1 PG (ref 27–33)
MCHC RBC AUTO-ENTMCNC: 34.2 G/DL (ref 32–36)
MCV RBC AUTO: 93.8 FL (ref 80–100)
OSMOLALITY CALCULATION: 287 MOSM/KG (ref 278–305)
PDW BLD-RTO: 14.3 % (ref 11–15)
PHOSPHORUS: 3.4 MG/DL (ref 2.1–4.5)
PLATELET # BLD: 144 10E3/UL (ref 140–400)
PMV BLD AUTO: 9.8 FL (ref 7.5–11.5)
POTASSIUM SERPL-SCNC: 4.3 MMOL/L (ref 3.5–5.3)
RBC # BLD: 4.08 10E6/UL (ref 3.8–5.1)
SODIUM BLD-SCNC: 138 MMOL/L (ref 133–146)
WBC # BLD: 4.9 10E3/UL (ref 3.8–10.8)

## 2023-10-30 ENCOUNTER — TELEPHONE (OUTPATIENT)
Dept: PRIMARY CARE CLINIC | Age: 88
End: 2023-10-30

## 2023-10-30 NOTE — TELEPHONE ENCOUNTER
----- Message from ALEKS sent at 10/30/2023 12:55 PM EDT -----  Subject: Message to Provider    QUESTIONS  Information for Provider? Roosevelt Samano, the  at [facility], is requesting orders for home healthcare for patient   [facility]. Fax   number is [phone] . Person to call if needed is Elsa Thomas at   [facility] [phone]  ---------------------------------------------------------------------------  --------------  [address]  [phone]; OK to leave message on voicemail  ---------------------------------------------------------------------------  --------------  SCRIPT ANSWERS  Relationship to Patient? Covered Entity  Covered Entity Type? Nursing Home? Representative Name?  Roosevelt Samano

## 2023-10-30 NOTE — TELEPHONE ENCOUNTER
[facility] called asking if you will follow pt for OT, PT, and skilled nursing.     They will fax the orders to you.    [phone]

## 2023-10-31 DIAGNOSIS — R53.83 FATIGUE, UNSPECIFIED TYPE: ICD-10-CM

## 2023-10-31 DIAGNOSIS — I10 ESSENTIAL HYPERTENSION: Primary | ICD-10-CM

## 2023-10-31 DIAGNOSIS — M54.50 ACUTE BILATERAL LOW BACK PAIN WITHOUT SCIATICA: ICD-10-CM

## 2023-11-03 ENCOUNTER — TELEPHONE (OUTPATIENT)
Dept: PRIMARY CARE CLINIC | Age: 88
End: 2023-11-03

## 2023-11-03 NOTE — TELEPHONE ENCOUNTER
----- Message from Grant Gunderson sent at 11/3/2023  2:31 PM EDT -----  Subject: Message to Provider    QUESTIONS  Information for Provider? Pt's son franki Calling wondering if Pt can do   her AWV during her 11/6 appt to get it done all on the same day. Please   call Fidencio Bosworth to confirm   ---------------------------------------------------------------------------  --------------  Evie MASTERS  [phone]; OK to leave message on voicemail  ---------------------------------------------------------------------------  --------------  SCRIPT ANSWERS  Relationship to Patient? Other/Third Party  Representative Name? Fidencio Bosworth  Is the representative on the Communication Release of Information (JOSIAH)   form in Epic?  Yes

## 2023-11-06 ENCOUNTER — OFFICE VISIT (OUTPATIENT)
Dept: PRIMARY CARE CLINIC | Age: 88
End: 2023-11-06

## 2023-11-06 VITALS
BODY MASS INDEX: 31.96 KG/M2 | OXYGEN SATURATION: 95 % | WEIGHT: 191.8 LBS | SYSTOLIC BLOOD PRESSURE: 120 MMHG | HEIGHT: 65 IN | TEMPERATURE: 97.4 F | HEART RATE: 61 BPM | DIASTOLIC BLOOD PRESSURE: 60 MMHG
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
## 2023-11-06 DIAGNOSIS — M54.50 ACUTE BILATERAL LOW BACK PAIN WITHOUT SCIATICA: ICD-10-CM

## 2023-11-06 DIAGNOSIS — N39.0 URINARY TRACT INFECTION WITHOUT HEMATURIA, SITE UNSPECIFIED: ICD-10-CM

## 2023-11-06 DIAGNOSIS — Z00.00 MEDICARE ANNUAL WELLNESS VISIT, SUBSEQUENT: Primary | ICD-10-CM

## 2023-11-06 DIAGNOSIS — I10 ESSENTIAL HYPERTENSION: ICD-10-CM

## 2023-11-06 DIAGNOSIS — Z23 NEED FOR INFLUENZA VACCINATION: ICD-10-CM

## 2023-11-06 LAB
BILIRUBIN, POC: NORMAL
BLOOD URINE, POC: NORMAL
CLARITY, POC: NORMAL
COLOR, POC: YELLOW
GLUCOSE URINE, POC: NORMAL
KETONES, POC: NORMAL
LEUKOCYTE EST, POC: NORMAL
NITRITE, POC: NORMAL
PH, POC: 6
PROTEIN, POC: NORMAL
SPECIFIC GRAVITY, POC: 1.01
UROBILINOGEN, POC: 0.2

## 2023-11-06 RX ORDER — LIDOCAINE 50 MG/G
1 PATCH TOPICAL DAILY
Qty: 30 PATCH | Refills: 1 | Status: SHIPPED | OUTPATIENT
Start: 2023-11-06 | End: 2023-12-06

## 2023-11-06 ASSESSMENT — PATIENT HEALTH QUESTIONNAIRE - PHQ9
SUM OF ALL RESPONSES TO PHQ QUESTIONS 1-9: 0
3. TROUBLE FALLING OR STAYING ASLEEP: 0
SUM OF ALL RESPONSES TO PHQ QUESTIONS 1-9: 1
1. LITTLE INTEREST OR PLEASURE IN DOING THINGS: 0
SUM OF ALL RESPONSES TO PHQ QUESTIONS 1-9: 1
4. FEELING TIRED OR HAVING LITTLE ENERGY: 1
SUM OF ALL RESPONSES TO PHQ QUESTIONS 1-9: 1
2. FEELING DOWN, DEPRESSED OR HOPELESS: 0
6. FEELING BAD ABOUT YOURSELF - OR THAT YOU ARE A FAILURE OR HAVE LET YOURSELF OR YOUR FAMILY DOWN: 0
1. LITTLE INTEREST OR PLEASURE IN DOING THINGS: 0
SUM OF ALL RESPONSES TO PHQ QUESTIONS 1-9: 1
2. FEELING DOWN, DEPRESSED OR HOPELESS: 0
SUM OF ALL RESPONSES TO PHQ9 QUESTIONS 1 & 2: 0
10. IF YOU CHECKED OFF ANY PROBLEMS, HOW DIFFICULT HAVE THESE PROBLEMS MADE IT FOR YOU TO DO YOUR WORK, TAKE CARE OF THINGS AT HOME, OR GET ALONG WITH OTHER PEOPLE: 0
SUM OF ALL RESPONSES TO PHQ QUESTIONS 1-9: 0
9. THOUGHTS THAT YOU WOULD BE BETTER OFF DEAD, OR OF HURTING YOURSELF: 0
SUM OF ALL RESPONSES TO PHQ9 QUESTIONS 1 & 2: 0
SUM OF ALL RESPONSES TO PHQ QUESTIONS 1-9: 0
7. TROUBLE CONCENTRATING ON THINGS, SUCH AS READING THE NEWSPAPER OR WATCHING TELEVISION: 0
SUM OF ALL RESPONSES TO PHQ QUESTIONS 1-9: 0
8. MOVING OR SPEAKING SO SLOWLY THAT OTHER PEOPLE COULD HAVE NOTICED. OR THE OPPOSITE, BEING SO FIGETY OR RESTLESS THAT YOU HAVE BEEN MOVING AROUND A LOT MORE THAN USUAL: 0
5. POOR APPETITE OR OVEREATING: 0

## 2023-11-06 ASSESSMENT — LIFESTYLE VARIABLES
HOW MANY STANDARD DRINKS CONTAINING ALCOHOL DO YOU HAVE ON A TYPICAL DAY: PATIENT DOES NOT DRINK
HOW OFTEN DO YOU HAVE A DRINK CONTAINING ALCOHOL: NEVER

## 2023-11-06 ASSESSMENT — COLUMBIA-SUICIDE SEVERITY RATING SCALE - C-SSRS
5. HAVE YOU STARTED TO WORK OUT OR WORKED OUT THE DETAILS OF HOW TO KILL YOURSELF? DO YOU INTEND TO CARRY OUT THIS PLAN?: NO
7. DID THIS OCCUR IN THE LAST THREE MONTHS: NO
4. HAVE YOU HAD THESE THOUGHTS AND HAD SOME INTENTION OF ACTING ON THEM?: NO
3. HAVE YOU BEEN THINKING ABOUT HOW YOU MIGHT KILL YOURSELF?: NO

## 2023-11-06 NOTE — PROGRESS NOTES
MD Lalito   pantoprazole (PROTONIX) 40 MG tablet Take 1 tablet by mouth once daily Yes Nida Redman MD   vitamin D (ERGOCALCIFEROL) 1.25 MG (26888 UT) CAPS capsule Take 1 capsule by mouth once a week Yes Nida Redman MD   acetaminophen (TYLENOL) 325 MG tablet every 6 hours as needed Yes Fiordaliza Slade MD   pregabalin (LYRICA) 75 MG capsule Take 1 capsule by mouth 2 times daily for 90 days.  Max Daily Amount: 150 mg Yes Nida Redman MD   vitamin C (ASCORBIC ACID) 500 MG tablet Take 2 tablets by mouth daily Yes Fiordaliza Slade MD   meclizine (ANTIVERT) 25 MG tablet Take 1 tablet by mouth daily (with breakfast) Yes Fiordaliza Slade MD   vitamin B-12 (CYANOCOBALAMIN) 1000 MCG tablet Take 1 tablet by mouth daily Yes Fiordaliza Slade MD   Multiple Vitamins-Minerals (EYE VITAMINS PO) Take by mouth Yes Fiordaliza Slade MD   fluticasone (FLONASE) 50 MCG/ACT nasal spray USE 2 SPRAYS NASALLY DAILY  Patient taking differently: PRN Yes Nida Redman MD   carbidopa-levodopa (SINEMET)  MG per tablet Take 2.5 tablets by mouth 3 times daily Yes Fiordaliza Slade MD   vitamin B-6 (PYRIDOXINE) 100 MG tablet Take 1 tablet by mouth daily Yes Fiordaliza Slade MD   vitamin E 400 UNIT capsule Take 1 capsule by mouth daily Yes Fiordaliza Slade MD   traMADol (ULTRAM) 50 MG tablet   Provider, Historical, MD       CareTeam (Including outside providers/suppliers regularly involved in providing care):   Patient Care Team:  Nida Redman MD as PCP - General (Family Medicine)  Nida Redman MD as PCP - Empaneled Provider  Andrea Mayer MD as Consulting Physician (Sleep Medicine [facility])     Reviewed and updated this visit:  Tobacco  Allergies  Meds  Med Hx  Surg Hx  Soc Hx  Fam Hx      Detail discussion with the son about their care  We will send the chart over care container  He is meeting with some type of home care company

## 2023-11-07 ENCOUNTER — CARE COORDINATION (OUTPATIENT)
Dept: CARE COORDINATION | Age: 88
End: 2023-11-07

## 2023-11-07 ENCOUNTER — TELEPHONE (OUTPATIENT)
Dept: PRIMARY CARE CLINIC | Age: 88
End: 2023-11-07

## 2023-11-07 NOTE — TELEPHONE ENCOUNTER
Dede Hoover from [facility] called to get verbal orders for OT plan of care for 4 weeks     # [phone]

## 2023-11-08 LAB
BACTERIA UR CULT: ABNORMAL
ORGANISM: ABNORMAL

## 2023-11-09 RX ORDER — NITROFURANTOIN 25; 75 MG/1; MG/1
100 CAPSULE ORAL 2 TIMES DAILY
Qty: 20 CAPSULE | Refills: 0 | Status: SHIPPED | OUTPATIENT
Start: 2023-11-09 | End: 2023-11-19

## 2023-11-10 ENCOUNTER — CARE COORDINATION (OUTPATIENT)
Dept: CARE COORDINATION | Age: 88
End: 2023-11-10

## 2023-11-10 NOTE — CARE COORDINATION
Ambulatory Care Coordination Note  11/10/2023    Patient Current Location:  Home: [address] [facility]      ACM contacted the patient by telephone. Verified name and  with family as identifiers. Provided introduction to self, and explanation of the ACM role. Challenges to be reviewed by the provider   Additional needs identified to be addressed with provider: No  none               Method of communication with provider: chart routing. ACM: Julianne Lee, RN     Acm spoke to patient son Pavan Mcclain who is helping care for his parents. Pavan Mcclain came to last office appt with patient. There was discussion regarding getting help for patient in the home d/t care deficit needs. Pavan Mcclain has called an agency that will help with light house cleaning and tasks around the home. Pavan Mcclain said he has also looked into assisted living but patient and spouse are not on board yet. Pavan Mcclain said there would be a waiting list for this facility if patient and spouse agree. ACM had discussion about palliative care services as another resource. Pavan Mcclain was provided phone number to do further research. ACM also informed Pavan Mcclain that SW could be consulted. Shakira said he is overwhelmed with the amount of calls he is receiving and would like to wait for a referral a this time. Pavan Mcclain is agreeable for ACM to call him back after Thanksgiving to touch base. Plan:    F/U call after Thanksgivign. Offered patient enrollment in the Remote Patient Monitoring (RPM) program for in-home monitoring:  N/A- Did not discuss . Lab Results       None            Care Coordination Interventions    Referral from Primary Care Provider: Yes  Suggested Interventions and [address]: In Process  Palliative Care:  In Process (Comment: Had discussion with patient son Pavan Mcclain)  Social Work: Not Started (Comment: Would like to discuss this referral during next call he is overwhelmed with the amoutn of calls he is receiving)

## 2023-11-14 ENCOUNTER — OFFICE VISIT (OUTPATIENT)
Dept: PULMONOLOGY | Age: 88
End: 2023-11-14
Payer: MEDICARE

## 2023-11-14 VITALS
WEIGHT: 192 LBS | SYSTOLIC BLOOD PRESSURE: 124 MMHG | OXYGEN SATURATION: 94 % | HEART RATE: 76 BPM | HEIGHT: 65 IN | DIASTOLIC BLOOD PRESSURE: 62 MMHG | BODY MASS INDEX: 31.99 KG/M2

## 2023-11-14 DIAGNOSIS — K21.9 GASTROESOPHAGEAL REFLUX DISEASE, UNSPECIFIED WHETHER ESOPHAGITIS PRESENT: ICD-10-CM

## 2023-11-14 DIAGNOSIS — E66.9 NON MORBID OBESITY, UNSPECIFIED OBESITY TYPE: ICD-10-CM

## 2023-11-14 DIAGNOSIS — I10 ESSENTIAL HYPERTENSION: ICD-10-CM

## 2023-11-14 DIAGNOSIS — G47.33 OBSTRUCTIVE SLEEP APNEA: Primary | ICD-10-CM

## 2023-11-14 PROCEDURE — 99214 OFFICE O/P EST MOD 30 MIN: CPT | Performed by: NURSE PRACTITIONER

## 2023-11-14 PROCEDURE — 1123F ACP DISCUSS/DSCN MKR DOCD: CPT | Performed by: NURSE PRACTITIONER

## 2023-11-14 RX ORDER — MODAFINIL 200 MG/1
200 TABLET ORAL DAILY
COMMUNITY

## 2023-11-14 RX ORDER — MODAFINIL 200 MG/1
200 TABLET ORAL DAILY
Status: CANCELLED | OUTPATIENT
Start: 2023-11-14

## 2023-11-14 ASSESSMENT — SLEEP AND FATIGUE QUESTIONNAIRES
HOW LIKELY ARE YOU TO NOD OFF OR FALL ASLEEP IN A CAR, WHILE STOPPED FOR A FEW MINUTES IN TRAFFIC: 0
HOW LIKELY ARE YOU TO NOD OFF OR FALL ASLEEP WHILE SITTING AND READING: 1
ESS TOTAL SCORE: 5
HOW LIKELY ARE YOU TO NOD OFF OR FALL ASLEEP WHILE WATCHING TV: 1
HOW LIKELY ARE YOU TO NOD OFF OR FALL ASLEEP WHILE SITTING QUIETLY AFTER LUNCH WITHOUT ALCOHOL: 1
HOW LIKELY ARE YOU TO NOD OFF OR FALL ASLEEP WHILE LYING DOWN TO REST IN THE AFTERNOON WHEN CIRCUMSTANCES PERMIT: 1
HOW LIKELY ARE YOU TO NOD OFF OR FALL ASLEEP WHILE SITTING INACTIVE IN A PUBLIC PLACE: 0
HOW LIKELY ARE YOU TO NOD OFF OR FALL ASLEEP WHILE SITTING AND TALKING TO SOMEONE: 0
HOW LIKELY ARE YOU TO NOD OFF OR FALL ASLEEP WHEN YOU ARE A PASSENGER IN A CAR FOR AN HOUR WITHOUT A BREAK: 1

## 2023-11-14 NOTE — ASSESSMENT & PLAN NOTE
Chronic - Not stable: Reviewed and analyzed results of physiologic download from patient's machine and reviewed with patients. Supplies and parts as needed for the machine. These are medically necessary. Limit caffeine use after 3 PM. Based on the analyzed data, we will continue with current settings. At this time, she needs to start using her machine and needs to continue to use it. Discussed in detail why she needs treatment for sleep apnea. Reminded her that compliance with her PAP therapy is also a part of the medication contract she signed prior to starting modafinil. Discussed trial of different styles of masks/headgear for comfort and for mask leak. Reviewed a few options and resources were provided. Also discussed in office mask fitting at the equipment company to ensure proper fit of the mask. Discussed adjusting humidity settings on the machine for oral dryness, and showed her and her  how to adjust them during the visit. Once she has better control over the mask and use the machine consistently, we will need to evaluate whether her sleep apnea is adequately controlled. I will not send refills for modafinil until she meets compliance with the PAP therapy, and she verbalized understanding. Would like her to return sooner but she agrees to return in March for follow up. Encouraged her to start using her machine each night, all night. Encouraged the patient to contact the office with any questions or concerns. Discussed the importance of consistence use of the machine and encouraged consistent use of the machine each night. Also discussed the importance of treating Obstructive Sleep Apnea from a physiological standpoint. Instructed not to drive unless had 4 hours of effective therapy for NELLY the night before. Did review the risks of under or untreated NELLY including, but not limited to, higher risks of motor vehicle accidents, stroke, heart attacks, and death.  Patients verbalized understanding and

## 2023-11-14 NOTE — PROGRESS NOTES
concerns. Discussed the importance of consistence use of the machine and encouraged consistent use of the machine each night. Also discussed the importance of treating Obstructive Sleep Apnea from a physiological standpoint. Instructed not to drive unless had 4 hours of effective therapy for NELLY the night before. Did review the risks of under or untreated NELLY including, but not limited to, higher risks of motor vehicle accidents, stroke, heart attacks, and death. Patients verbalized understanding and accepts all these risks. 2. Essential hypertension  Assessment & Plan:  Chronic- Stable. Discussed the importance of treating sleep apnea as part of the management of this disorder. Cont any meds per PCP and other physicians. 3. Gastroesophageal reflux disease, unspecified whether esophagitis present  Assessment & Plan:   Chronic- Stable. Discussed the importance of treating sleep apnea as part of the management of this disorder. Cont any meds per PCP and other physicians. 4. Non morbid obesity, unspecified obesity type  Assessment & Plan:  Chronic-not stable:  Discussed importance of treating obstructive sleep apnea and getting sufficient sleep to assist with weight control. Encouraged her to work on weight loss through diet and exercise. Recommended DASH or Mediterranean diets. Reviewed, analyzed, and documented physiologic data from patient's PAP machine. This information was analyzed to assess complexity and medical decision making in regards to further testing and management. The primary encounter diagnosis was Obstructive sleep apnea. Diagnoses of Essential hypertension, Gastroesophageal reflux disease, unspecified whether esophagitis present, and Non morbid obesity, unspecified obesity type were also pertinent to this visit. The chronic medical conditions listed are directly related to the primary diagnosis listed above.   The management of the primary diagnosis affects the secondary

## 2023-11-20 ENCOUNTER — TELEPHONE (OUTPATIENT)
Dept: PRIMARY CARE CLINIC | Age: 88
End: 2023-11-20

## 2023-11-20 DIAGNOSIS — I10 ESSENTIAL HYPERTENSION: ICD-10-CM

## 2023-11-20 RX ORDER — FUROSEMIDE 20 MG/1
20 TABLET ORAL DAILY
Qty: 90 TABLET | Refills: 0 | Status: SHIPPED | OUTPATIENT
Start: 2023-11-20

## 2023-11-20 RX ORDER — FUROSEMIDE 20 MG/1
20 TABLET ORAL DAILY
Qty: 90 TABLET | Refills: 0 | OUTPATIENT
Start: 2023-11-20

## 2023-11-20 NOTE — TELEPHONE ENCOUNTER
Pt states she needs a refill on   furosemide (LASIX) 20 MG tablet [[id number]]    [facility] [address] [place] [address] [place] - P [phone] - F [phone] [[id number]]

## 2023-11-20 NOTE — TELEPHONE ENCOUNTER
Pt needs to know if she is still supposed to be taking the meds   furosemide (LASIX) 20 MG tablet [[id number]]   Pls call pt back.     [facility] [address] [facility], [address] [place] [address] [[id number]]

## 2023-11-20 NOTE — TELEPHONE ENCOUNTER
Medication:   Requested Prescriptions     Pending Prescriptions Disp Refills    furosemide (LASIX) 20 MG tablet 90 tablet 0     Sig: Take 1 tablet by mouth daily     Last Filled:  10/3/2023    Last appt: 11/6/2023   Next appt: Visit date not found    Last OARRS:       6/3/2019    11:41 AM   RX Monitoring   Attestation The Prescription Monitoring Report for this patient was reviewed today.

## 2023-11-27 DIAGNOSIS — I10 ESSENTIAL HYPERTENSION: ICD-10-CM

## 2023-11-27 DIAGNOSIS — R53.83 FATIGUE, UNSPECIFIED TYPE: ICD-10-CM

## 2023-11-28 NOTE — TELEPHONE ENCOUNTER
Medication:   Requested Prescriptions     Pending Prescriptions Disp Refills    metoprolol tartrate (LOPRESSOR) 25 MG tablet [Pharmacy Med Name: Metoprolol Tartrate 25 MG Oral Tablet] 30 tablet 0     Sig: TAKE 1 TABLET BY MOUTH AT BEDTIME     Last Filled:  10/3/2023    Last appt: 11/6/2023   Next appt: Visit date not found    Last OARRS:       6/3/2019    11:41 AM   RX Monitoring   Attestation The Prescription Monitoring Report for this patient was reviewed today.

## 2023-12-01 ENCOUNTER — CARE COORDINATION (OUTPATIENT)
Dept: CARE COORDINATION | Age: 88
End: 2023-12-01

## 2023-12-01 SDOH — ECONOMIC STABILITY: FOOD INSECURITY: WITHIN THE PAST 12 MONTHS, THE FOOD YOU BOUGHT JUST DIDN'T LAST AND YOU DIDN'T HAVE MONEY TO GET MORE.: NEVER TRUE

## 2023-12-01 SDOH — ECONOMIC STABILITY: INCOME INSECURITY: IN THE LAST 12 MONTHS, WAS THERE A TIME WHEN YOU WERE NOT ABLE TO PAY THE MORTGAGE OR RENT ON TIME?: NO

## 2023-12-01 SDOH — ECONOMIC STABILITY: HOUSING INSECURITY: IN THE LAST 12 MONTHS, HOW MANY PLACES HAVE YOU LIVED?: 1

## 2023-12-01 SDOH — ECONOMIC STABILITY: FOOD INSECURITY: WITHIN THE PAST 12 MONTHS, YOU WORRIED THAT YOUR FOOD WOULD RUN OUT BEFORE YOU GOT MONEY TO BUY MORE.: NEVER TRUE

## 2023-12-01 NOTE — CARE COORDINATION
Ambulatory Care Coordination Note  2023    Patient Current Location:  Home: [address] [address]     ACM contacted the family by telephone. Verified name and  with family as identifiers. Provided introduction to self, and explanation of the ACM role. Challenges to be reviewed by the provider   Additional needs identified to be addressed with provider: No  none               Method of communication with provider: chart routing. ACM: Davin Vann RN     Acm completed follow up call with son Anthony Wiley who said at this time there is nothing additional needed for patient. Anthony Wiley said he has a new system for managing patient medications that is working out well. Anthony Wiley said he is looking into patients LTC policy and will work through that. ACM did provide [employer] phone number to help if needed. Anthony Wiley at this time declined any further calls and will reach out to [facility] if there is any additional need. Offered patient enrollment in the Remote Patient Monitoring (RPM) program for in-home monitoring: Patient declined.     Lab Results       None            Care Coordination Interventions    Referral from Primary Care Provider: Yes  Suggested Interventions and [address]: Completed  Palliative Care: Declined (Comment: Had discussion with patient son Anthony Wiley)  Social Work: Declined (Comment: Would like to discuss this referral during next call he is overwhelmed with the amoutn of calls he is receiving)          Goals Addressed    None         Future Appointments   Date Time Provider [address]   3/14/2024 11:40 AM Sherley Asif APRN - CNP [facility]    and   General Assessment    Do you have any symptoms that are causing concern?: No

## 2023-12-27 DIAGNOSIS — G89.29 CHRONIC BILATERAL LOW BACK PAIN WITHOUT SCIATICA: ICD-10-CM

## 2023-12-27 DIAGNOSIS — M54.50 CHRONIC BILATERAL LOW BACK PAIN WITHOUT SCIATICA: ICD-10-CM

## 2023-12-27 NOTE — TELEPHONE ENCOUNTER
Medication:   Requested Prescriptions     Pending Prescriptions Disp Refills    pregabalin (LYRICA) 75 MG capsule 240 capsule 0     Sig: Take 1 capsule by mouth 2 times daily for 90 days. Max Daily Amount: 150 mg     Last Filled:  2/21/2023    Last appt: 11/6/2023   Next appt: Visit date not found    Last OARRS:       6/3/2019    11:41 AM   RX Monitoring   Attestation The Prescription Monitoring Report for this patient was reviewed today.

## 2023-12-28 ENCOUNTER — TELEPHONE (OUTPATIENT)
Dept: PRIMARY CARE CLINIC | Age: 88
End: 2023-12-28

## 2023-12-28 DIAGNOSIS — N39.0 URINARY TRACT INFECTION WITHOUT HEMATURIA, SITE UNSPECIFIED: Primary | ICD-10-CM

## 2023-12-28 RX ORDER — PREGABALIN 75 MG/1
75 CAPSULE ORAL 2 TIMES DAILY
Qty: 240 CAPSULE | Refills: 0 | Status: SHIPPED | OUTPATIENT
Start: 2023-12-28 | End: 2024-03-27

## 2024-01-05 NOTE — PROGRESS NOTES
SUBJECTIVE:  Patient ID: Pan Young is a 80 y.o. y.o. female     HPI   Urinary Tract Infection: Patient complains of dysuria, frequency, urgency She has had symptoms for 3 days. Patient also complains of stomach ache. Patient denies back pain, congestion, cough, fever, headache and sorethroat. Patient does not have a history of recurrent UTI. Patient does not have a history of pyelonephritis. Hypertension: Patient here for follow-up of elevated blood pressure. She is not exercising and is adherent to low salt diet. Blood pressure is well controlled at home. Cardiac symptoms none. Patient denies chest pain, chest pressure/discomfort, claudication, dyspnea, exertional chest pressure/discomfort, fatigue, irregular heart beat, lower extremity edema, near-syncope, orthopnea, palpitations, paroxysmal nocturnal dyspnea and syncope. Cardiovascular risk factors: advanced age (older than 54 for men, 72 for women), dyslipidemia, hypertension and obesity (BMI >= 30 kg/m2). Use of agents associated with hypertension: none. History of target organ damage: none. Edema: Patient complains of edema. The location of the edema is ankle(s) right. The edema has been mild. Onset of symptoms was several days ago, gradually worsening since that time. The edema is present all day. The patient states never. The swelling has been aggravated by dependency of involved area and Patient with fracture on the right foot she is followed by podiatry, relieved by nothing, and been associated with nothing. Cardiac risk factors include advanced age (older than 54 for men, 72 for women), obesity (BMI >= 30 kg/m2) and sedentary lifestyle.       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 Urinary tract infection with hematuria, site unspecified  sulfamethoxazole-trimethoprim (BACTRIM DS;SEPTRA DS) 800-160 MG per tablet    POCT Urinalysis no Micro   3.  Essential hypertension           PLAN:  See orders  Increase hydration antibiotics sent to the pharmacy  Stat ultrasound rule out DVT Yes

## 2024-01-09 ENCOUNTER — TELEPHONE (OUTPATIENT)
Dept: PRIMARY CARE CLINIC | Age: 89
End: 2024-01-09

## 2024-01-18 DIAGNOSIS — K21.9 GASTROESOPHAGEAL REFLUX DISEASE WITHOUT ESOPHAGITIS: Chronic | ICD-10-CM

## 2024-01-18 NOTE — TELEPHONE ENCOUNTER
Medication:   Requested Prescriptions     Pending Prescriptions Disp Refills    tiZANidine (ZANAFLEX) 4 MG tablet [Pharmacy Med Name: tiZANidine HCl 4 MG Oral Tablet] 60 tablet 0     Sig: TAKE 1 TABLET BY MOUTH TWICE DAILY AS NEEDED FOR MUSCLE SPASM    pantoprazole (PROTONIX) 40 MG tablet [Pharmacy Med Name: Pantoprazole Sodium 40 MG Oral Tablet Delayed Release] 90 tablet 0     Sig: Take 1 tablet by mouth once daily     Last Filled:  tizanidine - 12/21/2023  Pantoprazole - 10/3/2023    Last appt: 11/6/2023   Next appt: Visit date not found    Last OARRS:       6/3/2019    11:41 AM   RX Monitoring   Attestation The Prescription Monitoring Report for this patient was reviewed today.

## 2024-01-19 RX ORDER — TIZANIDINE 4 MG/1
TABLET ORAL
Qty: 60 TABLET | Refills: 0 | Status: SHIPPED | OUTPATIENT
Start: 2024-01-19

## 2024-01-19 RX ORDER — PANTOPRAZOLE SODIUM 40 MG/1
TABLET, DELAYED RELEASE ORAL
Qty: 90 TABLET | Refills: 0 | Status: SHIPPED | OUTPATIENT
Start: 2024-01-19

## 2024-01-24 NOTE — TELEPHONE ENCOUNTER
Medication:   Requested Prescriptions     Pending Prescriptions Disp Refills    vitamin D (ERGOCALCIFEROL) 1.25 MG (30595 UT) CAPS capsule 24 capsule 0     Sig: Take 1 capsule by mouth once a week     Last Filled:  8/21/2023    Last appt: 11/6/2023   Next appt: Visit date not found    Last OARRS:       6/3/2019    11:41 AM   RX Monitoring   Attestation The Prescription Monitoring Report for this patient was reviewed today.

## 2024-01-24 NOTE — TELEPHONE ENCOUNTER
Pt requesting medication refill  pregabalin (LYRICA) 75 MG capsule [[id number]  wants 50mg instead 75mg       vitamin D (ERGOCALCIFEROL) 1.25 MG (46403 UT) CAPS capsule [[id number]]      [facility] [address] [place], [place] - [address] [facility] - P [phone] - F [phone] [address] [facility] [address]  Phone: [phone]  Fax: [phone]

## 2024-01-25 RX ORDER — ERGOCALCIFEROL 1.25 MG/1
CAPSULE ORAL
Qty: 24 CAPSULE | Refills: 0 | Status: SHIPPED | OUTPATIENT
Start: 2024-01-25

## 2024-01-29 RX ORDER — ERGOCALCIFEROL 1.25 MG/1
CAPSULE ORAL
Qty: 24 CAPSULE | Refills: 0 | OUTPATIENT
Start: 2024-01-29

## 2024-01-29 NOTE — TELEPHONE ENCOUNTER
Medication:   Requested Prescriptions     Pending Prescriptions Disp Refills    vitamin D (ERGOCALCIFEROL) 1.25 MG (97740 UT) CAPS capsule [Pharmacy Med Name: Vitamin D (Ergocalciferol) 1.25 MG (49708 UT) Oral Capsule] 24 capsule 0     Sig: Take 1 capsule by mouth once a week     Last filled: 1.25.24  Last appt: 11/6/2023   Next appt: Visit date not found    Last OARRS:       6/3/2019    11:41 AM   RX Monitoring   Attestation The Prescription Monitoring Report for this patient was reviewed today.

## 2024-01-31 ENCOUNTER — TELEPHONE (OUTPATIENT)
Dept: PRIMARY CARE CLINIC | Age: 89
End: 2024-01-31

## 2024-01-31 NOTE — TELEPHONE ENCOUNTER
Patient in bed alert and oriented x 4, call light within reach. Fall preventions in place. Assisted by staff with ADL's. Will continue with plan of care.   Pt (Patricia) son called (Fer), stated that her Vit D needs a prior auth done for the mail order pharmacy.    He also needs to know what meds his mother should be taking.  There is a little confusion since the hospital visit.

## 2024-02-01 NOTE — TELEPHONE ENCOUNTER
She should be taking vitamin D, needing prior authorization is unusual for this medication  Can you please call and clarify?

## 2024-02-07 ENCOUNTER — TELEPHONE (OUTPATIENT)
Dept: PRIMARY CARE CLINIC | Age: 89
End: 2024-02-07

## 2024-02-07 NOTE — TELEPHONE ENCOUNTER
Patient walked in and drop the form for renewal of [facility] placard to get signed by Dr Corrales.    Please review .    She also requested to send her by mail when it is ready    Thanks

## 2024-02-12 RX ORDER — PRAVASTATIN SODIUM 20 MG
TABLET ORAL
Qty: 90 TABLET | Refills: 0 | Status: SHIPPED | OUTPATIENT
Start: 2024-02-12

## 2024-02-12 RX ORDER — TIZANIDINE 4 MG/1
TABLET ORAL
Qty: 60 TABLET | Refills: 0 | Status: SHIPPED | OUTPATIENT
Start: 2024-02-12

## 2024-02-12 NOTE — TELEPHONE ENCOUNTER
Medication:   Requested Prescriptions     Pending Prescriptions Disp Refills    pravastatin (PRAVACHOL) 20 MG tablet [Pharmacy Med Name: Pravastatin Sodium 20 MG Oral Tablet] 90 tablet 0     Sig: Take 1 tablet by mouth once daily    tiZANidine (ZANAFLEX) 4 MG tablet [Pharmacy Med Name: tiZANidine HCl 4 MG Oral Tablet] 60 tablet 0     Sig: TAKE 1 TABLET BY MOUTH TWICE DAILY AS NEEDED FOR MUSCLE SPASM     Last Filled:  10.3.23 1.19.24    Last appt: 11/6/2023   Next appt: 4/2/2024    Last Lipid:   Lab Results   Component Value Date/Time    CHOL 167 08/12/2019 03:50 PM    TRIG 111 08/12/2019 03:50 PM    HDL 64 08/12/2019 03:50 PM    HDL 63 05/30/2012 11:19 AM    LDLCALC 81 08/12/2019 03:50 PM

## 2024-03-20 ENCOUNTER — TELEPHONE (OUTPATIENT)
Dept: PRIMARY CARE CLINIC | Age: 89
End: 2024-03-20

## 2024-03-20 ENCOUNTER — OFFICE VISIT (OUTPATIENT)
Dept: PRIMARY CARE CLINIC | Age: 89
End: 2024-03-20
Payer: MEDICARE

## 2024-03-20 VITALS
TEMPERATURE: 97.5 F | HEART RATE: 70 BPM | BODY MASS INDEX: 31.12 KG/M2 | DIASTOLIC BLOOD PRESSURE: 60 MMHG | SYSTOLIC BLOOD PRESSURE: 124 MMHG | WEIGHT: 187 LBS | OXYGEN SATURATION: 96 %

## 2024-03-20 DIAGNOSIS — G47.33 OBSTRUCTIVE SLEEP APNEA: Chronic | ICD-10-CM

## 2024-03-20 DIAGNOSIS — R42 LIGHTHEADEDNESS: Primary | ICD-10-CM

## 2024-03-20 DIAGNOSIS — R82.998 LEUKOCYTES IN URINE: ICD-10-CM

## 2024-03-20 DIAGNOSIS — R30.0 DYSURIA: ICD-10-CM

## 2024-03-20 DIAGNOSIS — R42 LIGHTHEADEDNESS: ICD-10-CM

## 2024-03-20 LAB
ANION GAP SERPL CALCULATED.3IONS-SCNC: 12 MMOL/L (ref 3–16)
BILIRUBIN, POC: NEGATIVE
BLOOD URINE, POC: NORMAL
BUN SERPL-MCNC: 17 MG/DL (ref 7–20)
CALCIUM SERPL-MCNC: 9 MG/DL (ref 8.3–10.6)
CHLORIDE SERPL-SCNC: 101 MMOL/L (ref 99–110)
CLARITY, POC: CLEAR
CO2 SERPL-SCNC: 30 MMOL/L (ref 21–32)
COLOR, POC: YELLOW
CREAT SERPL-MCNC: 0.9 MG/DL (ref 0.6–1.2)
DEPRECATED RDW RBC AUTO: 14.1 % (ref 12.4–15.4)
GFR SERPLBLD CREATININE-BSD FMLA CKD-EPI: >60 ML/MIN/{1.73_M2}
GLUCOSE SERPL-MCNC: 142 MG/DL (ref 70–99)
GLUCOSE URINE, POC: NEGATIVE
HCT VFR BLD AUTO: 37.8 % (ref 36–48)
HGB BLD-MCNC: 13.1 G/DL (ref 12–16)
KETONES, POC: NEGATIVE
LEUKOCYTE EST, POC: NORMAL
MAGNESIUM SERPL-MCNC: 1.8 MG/DL (ref 1.8–2.4)
MCH RBC QN AUTO: 32.3 PG (ref 26–34)
MCHC RBC AUTO-ENTMCNC: 34.7 G/DL (ref 31–36)
MCV RBC AUTO: 93 FL (ref 80–100)
NITRITE, POC: NEGATIVE
PH, POC: 5.5
PLATELET # BLD AUTO: 185 K/UL (ref 135–450)
PMV BLD AUTO: 10.7 FL (ref 5–10.5)
POTASSIUM SERPL-SCNC: 3.4 MMOL/L (ref 3.5–5.1)
PROTEIN, POC: NEGATIVE
RBC # BLD AUTO: 4.07 M/UL (ref 4–5.2)
SODIUM SERPL-SCNC: 143 MMOL/L (ref 136–145)
SPECIFIC GRAVITY, POC: 1.02
UROBILINOGEN, POC: 0.2
WBC # BLD AUTO: 8.3 K/UL (ref 4–11)

## 2024-03-20 PROCEDURE — 99214 OFFICE O/P EST MOD 30 MIN: CPT | Performed by: NURSE PRACTITIONER

## 2024-03-20 PROCEDURE — 81002 URINALYSIS NONAUTO W/O SCOPE: CPT | Performed by: NURSE PRACTITIONER

## 2024-03-20 PROCEDURE — 1123F ACP DISCUSS/DSCN MKR DOCD: CPT | Performed by: NURSE PRACTITIONER

## 2024-03-20 SDOH — ECONOMIC STABILITY: FOOD INSECURITY: WITHIN THE PAST 12 MONTHS, THE FOOD YOU BOUGHT JUST DIDN'T LAST AND YOU DIDN'T HAVE MONEY TO GET MORE.: NEVER TRUE

## 2024-03-20 SDOH — ECONOMIC STABILITY: FOOD INSECURITY: WITHIN THE PAST 12 MONTHS, YOU WORRIED THAT YOUR FOOD WOULD RUN OUT BEFORE YOU GOT MONEY TO BUY MORE.: NEVER TRUE

## 2024-03-20 SDOH — ECONOMIC STABILITY: INCOME INSECURITY: HOW HARD IS IT FOR YOU TO PAY FOR THE VERY BASICS LIKE FOOD, HOUSING, MEDICAL CARE, AND HEATING?: NOT HARD AT ALL

## 2024-03-20 ASSESSMENT — PATIENT HEALTH QUESTIONNAIRE - PHQ9
SUM OF ALL RESPONSES TO PHQ QUESTIONS 1-9: 0
SUM OF ALL RESPONSES TO PHQ QUESTIONS 1-9: 0
2. FEELING DOWN, DEPRESSED OR HOPELESS: NOT AT ALL
SUM OF ALL RESPONSES TO PHQ QUESTIONS 1-9: 0
SUM OF ALL RESPONSES TO PHQ9 QUESTIONS 1 & 2: 0
1. LITTLE INTEREST OR PLEASURE IN DOING THINGS: NOT AT ALL
SUM OF ALL RESPONSES TO PHQ QUESTIONS 1-9: 0

## 2024-03-20 ASSESSMENT — ENCOUNTER SYMPTOMS
VOMITING: 0
NAUSEA: 0
DIARRHEA: 0
SHORTNESS OF BREATH: 0
WHEEZING: 0
COUGH: 0

## 2024-03-20 NOTE — TELEPHONE ENCOUNTER
Spoke to patient  she did change the battery and it now reads 95 oxygen.   Pt also complain of dizziness, schedule her appointment to see Geeta VÁSQUEZ this afternoon

## 2024-03-20 NOTE — PROGRESS NOTES
ENCOUNTER DATE: 3/20/2024     NAME: Patricia Wilkinson   AGE: 89 y.o.   GENDER: female   YOB: 1934    Chief Complaint   Patient presents with    Other     Breathing deep this morning then go better Oxygen 90-94    Blurred Vision       ASSESSMENT/PLAN:  1. Lightheadedness  Discussed differentials  Vitals stable.  Check labs today.  Send urine for culture.  Proceed pending results  Encouraged to increase water intake  Advised if symptoms worsen, should go to ED  - CBC; Future  - Basic Metabolic Panel; Future  - Magnesium; Future    2. Dysuria  - POCT Urinalysis no Micro  - Culture, Urine    3. Leukocytes in urine  - Culture, Urine    4. Obstructive sleep apnea  Patient not using CPAP machine at this time.  The irregular breathing that her  noticed while she was sleeping was likely due to the sleep apnea.  O2 sat is stable      Return for Appointment with PCP as scheduled.     HPI:   Patient is here for problem visit    Noticed yesterday vision is a little blurred. Both eyes.    felt that her breathing was off this am.   She felt tired, but she was up late packing. They are getting ready to move.   She was napping and that's when her  said she was breathing weird.   States she checked her O2 sat and it was low. She changed the batteries and since then have all been in the 90s.   H/o NELLY. Hasn't tolerated CPAP.     Currently she denies any shortness of breath or wheezing. No difficulty breathing. No chest pains. No palpitations. No racing HR.   Had some dysuria yesterday. No odor to urine.   Feels like she \"is on the verge of being dizzy\", maybe a little light headed. Not with position changes  No numbness or tingling in extremities. No weakness   No n/v/d. No congestion. No headache. Appetite lower than usual, but this has been going on for some time.   Drank water with meds this am. No other water intake today.     ROS:  Review of Systems   Constitutional:  Negative for chills,

## 2024-03-20 NOTE — TELEPHONE ENCOUNTER
Pt called and said that she checked her pulse ox this morning and it buffers and doesn't register or it registers in the \"80\"s\". Said it doesn't do that for my  when he checks his.    Pt denies any difficulty breathing or SOB. Said she feels fine. Asked her to recheck her pulse Ox while on phone. She stated they misplaced it, but when she finds it she will recheck.   Suggested changing batteries and checking finger is not too cold. Also suggested trying different finger.     Offered appt- declined at this time  Pt will call back

## 2024-03-22 LAB — BACTERIA UR CULT: NORMAL

## 2024-03-28 DIAGNOSIS — I10 ESSENTIAL HYPERTENSION: ICD-10-CM

## 2024-03-28 DIAGNOSIS — R53.83 FATIGUE, UNSPECIFIED TYPE: ICD-10-CM

## 2024-03-29 RX ORDER — TIZANIDINE 4 MG/1
TABLET ORAL
Qty: 60 TABLET | Refills: 0 | Status: SHIPPED | OUTPATIENT
Start: 2024-03-29

## 2024-03-29 NOTE — TELEPHONE ENCOUNTER
Medication:   Requested Prescriptions     Pending Prescriptions Disp Refills    tiZANidine (ZANAFLEX) 4 MG tablet [Pharmacy Med Name: tiZANidine HCl 4 MG Oral Tablet] 60 tablet 0     Sig: TAKE 1 TABLET BY MOUTH TWICE DAILY AS NEEDED FOR MUSCLE SPASM    metoprolol tartrate (LOPRESSOR) 25 MG tablet [Pharmacy Med Name: Metoprolol Tartrate 25 MG Oral Tablet] 60 tablet 0     Sig: Take 2 tablets by mouth twice daily     Last Filled:  12.18.23    Last appt: 3/20/2024   Next appt: 4/2/2024    Last OARRS:       6/3/2019    11:41 AM   RX Monitoring   Attestation The Prescription Monitoring Report for this patient was reviewed today.

## 2024-04-02 ENCOUNTER — OFFICE VISIT (OUTPATIENT)
Dept: PRIMARY CARE CLINIC | Age: 89
End: 2024-04-02

## 2024-04-02 VITALS
SYSTOLIC BLOOD PRESSURE: 140 MMHG | WEIGHT: 185 LBS | DIASTOLIC BLOOD PRESSURE: 82 MMHG | HEIGHT: 65 IN | BODY MASS INDEX: 30.82 KG/M2 | HEART RATE: 82 BPM | RESPIRATION RATE: 16 BRPM | TEMPERATURE: 96.9 F | OXYGEN SATURATION: 96 %

## 2024-04-02 DIAGNOSIS — Z98.890 H/O ORAL SURGERY: ICD-10-CM

## 2024-04-02 DIAGNOSIS — Z00.00 MEDICARE ANNUAL WELLNESS VISIT, SUBSEQUENT: Primary | ICD-10-CM

## 2024-04-02 DIAGNOSIS — I10 ESSENTIAL HYPERTENSION: ICD-10-CM

## 2024-04-02 DIAGNOSIS — G89.29 CHRONIC BILATERAL LOW BACK PAIN WITHOUT SCIATICA: ICD-10-CM

## 2024-04-02 DIAGNOSIS — K21.9 GASTROESOPHAGEAL REFLUX DISEASE, UNSPECIFIED WHETHER ESOPHAGITIS PRESENT: Chronic | ICD-10-CM

## 2024-04-02 DIAGNOSIS — D05.91 CARCINOMA IN SITU OF RIGHT BREAST, UNSPECIFIED TYPE: Chronic | ICD-10-CM

## 2024-04-02 DIAGNOSIS — C02.9 TONGUE CANCER (HCC): ICD-10-CM

## 2024-04-02 DIAGNOSIS — G20.A1 PARKINSON'S DISEASE WITHOUT FLUCTUATING MANIFESTATIONS, UNSPECIFIED WHETHER DYSKINESIA PRESENT (HCC): ICD-10-CM

## 2024-04-02 DIAGNOSIS — M54.50 CHRONIC BILATERAL LOW BACK PAIN WITHOUT SCIATICA: ICD-10-CM

## 2024-04-02 DIAGNOSIS — M48.07 SPINAL STENOSIS OF LUMBOSACRAL REGION: ICD-10-CM

## 2024-04-02 DIAGNOSIS — M19.90 ARTHRITIS: ICD-10-CM

## 2024-04-02 DIAGNOSIS — E78.2 MIXED HYPERLIPIDEMIA: ICD-10-CM

## 2024-04-02 ASSESSMENT — PATIENT HEALTH QUESTIONNAIRE - PHQ9
1. LITTLE INTEREST OR PLEASURE IN DOING THINGS: NOT AT ALL
2. FEELING DOWN, DEPRESSED OR HOPELESS: NOT AT ALL
SUM OF ALL RESPONSES TO PHQ QUESTIONS 1-9: 0
SUM OF ALL RESPONSES TO PHQ9 QUESTIONS 1 & 2: 0

## 2024-04-02 ASSESSMENT — LIFESTYLE VARIABLES
HOW OFTEN DO YOU HAVE A DRINK CONTAINING ALCOHOL: NEVER
HOW MANY STANDARD DRINKS CONTAINING ALCOHOL DO YOU HAVE ON A TYPICAL DAY: PATIENT DOES NOT DRINK

## 2024-04-02 NOTE — PROGRESS NOTES
Interpretation: Abnormal Mini-Cog    Interventions:  Patient declines any further evaluation or treatment        Controlled Medication Review:      Today's Pain Level: No data recorded   Opioid Risk: (Low risk score <55) Opioid risk score: 19    Patient is low risk for opioid use disorder or overdose.    Last PDMP [place] as Reviewed:  Review User Review Instant Review Result   CHANDNI FOSTER 11/14/2023 12:42 PM     Reviewed PDMP [1]     Last Controlled Substance Monitoring Documentation      Flowsheet Row Office Visit from 6/3/2019 in [facility]   Attestation The Prescription Monitoring Report for this patient was reviewed today. filed at 06/03/2019 1141               Activity, Diet, and Weight:  On average, how many days per week do you engage in moderate to strenuous exercise (like a brisk walk)?: 0 days  On average, how many minutes do you engage in exercise at this level?: 0 min    Do you eat balanced/healthy meals regularly?: (!) No    Body mass index is 30.79 kg/m². (!) Abnormal        Inactivity Interventions:  Patient advised to follow up in the office for further evaluation and treatment  Do you eat balanced/healthy meals regularly Interventions:  Patient advised to follow-up in this office for further evaluation and treatment  Obesity Interventions:  Pt is stable            Vision Screen:  Do you have difficulty driving, watching TV, or doing any of your daily activities because of your eyesight?: No  Have you had an eye exam within the past year?: (!) No  No results found.    Interventions:   Patient encouraged to make appointment with their eye specialist    Safety:  Do you have any tripping hazards - loose or unsecured carpets or rugs?: (!) Yes    Interventions:  Patient advised to follow up in the office for further evaluation and treatment    ADL's:   Patient reports needing help with:  Select all that apply: (!) Dressing, Bathing, Walking/Balance  Select all that apply: (!)

## 2024-10-23 NOTE — TELEPHONE ENCOUNTER
----- Message from Mike Kelley sent at 1/20/2023  4:24 PM EST -----  Subject: Message to Provider    QUESTIONS  Information for Provider? Pt calling in to report the UTI went away and   came back. Would like Bactrim sent to [facility]. ---------------------------------------------------------------------------  --------------  Babs Cueto IB  [phone]; OK to leave message on voicemail  ---------------------------------------------------------------------------  --------------  SCRIPT ANSWERS  Relationship to Patient?  Self
[phone] ([place])   Pt informed
LOV 12/29  
Sent antibiotic to her pharmacy  She is not better over the weekend to go to the emergency room  Follow-up next
(4) no impairment

## 2025-07-02 ENCOUNTER — APPOINTMENT (OUTPATIENT)
Age: 89
End: 2025-07-02
Payer: MEDICARE

## 2025-07-02 ENCOUNTER — HOSPITAL ENCOUNTER (EMERGENCY)
Age: 89
Discharge: HOME | End: 2025-07-02
Attending: EMERGENCY MEDICINE
Payer: MEDICARE

## 2025-07-02 VITALS
RESPIRATION RATE: 16 BRPM | SYSTOLIC BLOOD PRESSURE: 160 MMHG | BODY MASS INDEX: 31.65 KG/M2 | DIASTOLIC BLOOD PRESSURE: 98 MMHG | HEIGHT: 62 IN | TEMPERATURE: 98.1 F | WEIGHT: 172 LBS | OXYGEN SATURATION: 99 % | HEART RATE: 51 BPM

## 2025-07-02 DIAGNOSIS — N39.0 URINARY TRACT INFECTION WITHOUT HEMATURIA, SITE UNSPECIFIED: Primary | ICD-10-CM

## 2025-07-02 DIAGNOSIS — R10.30 LOWER ABDOMINAL PAIN: ICD-10-CM

## 2025-07-02 DIAGNOSIS — E87.6 HYPOKALEMIA: ICD-10-CM

## 2025-07-02 LAB
ALBUMIN SERPL-MCNC: 4.1 G/DL (ref 3.4–5)
ALBUMIN/GLOB SERPL: 1.9 {RATIO}
ALP SERPL-CCNC: 66 U/L (ref 40–129)
ALT SERPL-CCNC: <5 U/L (ref 10–40)
ANION GAP SERPL CALCULATED.3IONS-SCNC: 16 MMOL/L (ref 3–16)
AST SERPL-CCNC: 17 U/L (ref 15–37)
BACTERIA URNS QL MICRO: ABNORMAL
BASOPHILS # BLD: 0.01 K/UL (ref 0–0.2)
BASOPHILS NFR BLD: 0 %
BILIRUB SERPL-MCNC: 1.2 MG/DL (ref 0–1)
BILIRUB UR QL STRIP: NEGATIVE
BUN SERPL-MCNC: 8 MG/DL (ref 7–20)
CALCIUM SERPL-MCNC: 9.1 MG/DL (ref 8.3–10.6)
CHARACTER UR: ABNORMAL
CHLORIDE SERPL-SCNC: 92 MMOL/L (ref 99–110)
CLARITY UR: CLEAR
CO2 SERPL-SCNC: 27 MMOL/L (ref 21–32)
COLOR UR: YELLOW
CREAT SERPL-MCNC: 0.8 MG/DL (ref 0.6–1.2)
EOSINOPHIL # BLD: 0.07 K/UL (ref 0–0.6)
EOSINOPHILS RELATIVE PERCENT: 1 %
EPI CELLS #/AREA URNS HPF: ABNORMAL /HPF
ERYTHROCYTE [DISTWIDTH] IN BLOOD BY AUTOMATED COUNT: 12.5 % (ref 12.4–15.4)
GFR, ESTIMATED: 73 ML/MIN/1.73M2
GLUCOSE SERPL-MCNC: 125 MG/DL (ref 70–99)
GLUCOSE UR STRIP-MCNC: NEGATIVE MG/DL
HCT VFR BLD AUTO: 42.2 % (ref 36–48)
HGB BLD-MCNC: 14.2 G/DL (ref 12–16)
HGB UR QL STRIP.AUTO: NEGATIVE
IMM GRANULOCYTES # BLD AUTO: 0.01 K/UL (ref 0–0.5)
IMM GRANULOCYTES NFR BLD: 0 %
KETONES UR STRIP-MCNC: ABNORMAL MG/DL
LACTATE BLDV-SCNC: 1.7 MMOL/L (ref 0.4–1.9)
LACTATE BLDV-SCNC: 2.1 MMOL/L (ref 0.4–1.9)
LEUKOCYTE ESTERASE UR QL STRIP: ABNORMAL
LIPASE SERPL-CCNC: 49 U/L (ref 13–60)
LYMPHOCYTES NFR BLD: 1.16 K/UL (ref 1–5.1)
LYMPHOCYTES RELATIVE PERCENT: 18 %
MAGNESIUM SERPL-MCNC: 1.6 MG/DL (ref 1.8–2.4)
MCH RBC QN AUTO: 30.5 PG (ref 26–34)
MCHC RBC AUTO-ENTMCNC: 33.6 G/DL (ref 31–36)
MCV RBC AUTO: 90.8 FL (ref 80–100)
MONOCYTES NFR BLD: 0.48 K/UL (ref 0–1.3)
MONOCYTES NFR BLD: 7 %
NEUTROPHILS NFR BLD: 74 %
NEUTS SEG NFR BLD: 4.85 K/UL (ref 1.7–7.7)
NITRITE UR QL STRIP: POSITIVE
PH UR STRIP: 6 [PH] (ref 5–8)
PLATELET # BLD AUTO: 189 K/UL (ref 135–450)
PMV BLD AUTO: 11.5 FL
POTASSIUM SERPL-SCNC: 2.8 MMOL/L (ref 3.5–5.1)
PROT SERPL-MCNC: 6.3 G/DL (ref 6.4–8.2)
PROT UR STRIP-MCNC: NEGATIVE MG/DL
RBC # BLD AUTO: 4.65 M/UL (ref 4–5.2)
RBC #/AREA URNS HPF: ABNORMAL /HPF
SODIUM SERPL-SCNC: 135 MMOL/L (ref 136–145)
SP GR UR STRIP: <1.005 (ref 1–1.03)
UROBILINOGEN UR STRIP-ACNC: 0.2 EU/DL (ref 0–1)
WBC #/AREA URNS HPF: ABNORMAL /HPF
WBC OTHER # BLD: 6.6 K/UL (ref 4–11)

## 2025-07-02 PROCEDURE — 2580000003 HC RX 258: Performed by: EMERGENCY MEDICINE

## 2025-07-02 PROCEDURE — 81001 URINALYSIS AUTO W/SCOPE: CPT

## 2025-07-02 PROCEDURE — 6370000000 HC RX 637 (ALT 250 FOR IP): Performed by: EMERGENCY MEDICINE

## 2025-07-02 PROCEDURE — 96374 THER/PROPH/DIAG INJ IV PUSH: CPT

## 2025-07-02 PROCEDURE — 99285 EMERGENCY DEPT VISIT HI MDM: CPT

## 2025-07-02 PROCEDURE — 83735 ASSAY OF MAGNESIUM: CPT

## 2025-07-02 PROCEDURE — 74174 CTA ABD&PLVS W/CONTRAST: CPT

## 2025-07-02 PROCEDURE — 83690 ASSAY OF LIPASE: CPT

## 2025-07-02 PROCEDURE — 85025 COMPLETE CBC W/AUTO DIFF WBC: CPT

## 2025-07-02 PROCEDURE — 6360000002 HC RX W HCPCS: Performed by: EMERGENCY MEDICINE

## 2025-07-02 PROCEDURE — 6360000004 HC RX CONTRAST MEDICATION: Performed by: EMERGENCY MEDICINE

## 2025-07-02 PROCEDURE — 80053 COMPREHEN METABOLIC PANEL: CPT

## 2025-07-02 PROCEDURE — 83605 ASSAY OF LACTIC ACID: CPT

## 2025-07-02 RX ORDER — 0.9 % SODIUM CHLORIDE 0.9 %
500 INTRAVENOUS SOLUTION INTRAVENOUS ONCE
Status: COMPLETED | OUTPATIENT
Start: 2025-07-02 | End: 2025-07-02

## 2025-07-02 RX ORDER — ONDANSETRON 2 MG/ML
4 INJECTION INTRAMUSCULAR; INTRAVENOUS ONCE
Status: COMPLETED | OUTPATIENT
Start: 2025-07-02 | End: 2025-07-02

## 2025-07-02 RX ORDER — IOPAMIDOL 755 MG/ML
75 INJECTION, SOLUTION INTRAVASCULAR
Status: COMPLETED | OUTPATIENT
Start: 2025-07-02 | End: 2025-07-02

## 2025-07-02 RX ORDER — POTASSIUM CHLORIDE 1500 MG/1
40 TABLET, EXTENDED RELEASE ORAL ONCE
Status: COMPLETED | OUTPATIENT
Start: 2025-07-02 | End: 2025-07-02

## 2025-07-02 RX ADMIN — IOPAMIDOL 75 ML: 755 INJECTION, SOLUTION INTRAVENOUS at 15:20

## 2025-07-02 RX ADMIN — ONDANSETRON 4 MG: 2 INJECTION, SOLUTION INTRAMUSCULAR; INTRAVENOUS at 14:10

## 2025-07-02 RX ADMIN — POTASSIUM CHLORIDE 40 MEQ: 1500 TABLET, EXTENDED RELEASE ORAL at 15:36

## 2025-07-02 RX ADMIN — SODIUM CHLORIDE 500 ML: 0.9 INJECTION, SOLUTION INTRAVENOUS at 15:35

## 2025-07-02 ASSESSMENT — PAIN - FUNCTIONAL ASSESSMENT: PAIN_FUNCTIONAL_ASSESSMENT: 0-10

## 2025-07-02 ASSESSMENT — PAIN DESCRIPTION - ORIENTATION: ORIENTATION: MID;LOWER

## 2025-07-02 ASSESSMENT — PAIN DESCRIPTION - LOCATION: LOCATION: ABDOMEN

## 2025-07-02 ASSESSMENT — PAIN SCALES - GENERAL: PAINLEVEL_OUTOF10: 5

## 2025-07-02 NOTE — ED PROVIDER NOTES
Tobacco Use    Smoking status: Never     Passive exposure: Never    Smokeless tobacco: Never   Vaping Use    Vaping status: Never Used   Substance Use Topics    Alcohol use: Yes     Comment: very rare occasions    Drug use: No       SCREENINGS        Boston Coma Scale  Eye Opening: Spontaneous  Best Verbal Response: Oriented  Best Motor Response: Obeys commands  Boston Coma Scale Score: 15                CIWA Assessment  BP: (!) 160/98  Pulse: 51           PHYSICAL EXAM  1 or more Elements     ED Triage Vitals   BP Systolic BP Percentile Diastolic BP Percentile Temp Temp src Pulse Resp SpO2   -- -- -- -- -- -- -- --      Height Weight         -- --             Physical Exam    My pulse oximetry interpretation is which is within the normal range    GENERAL APPEARANCE: Awake and alert. Cooperative. No acute distress.  HEAD: Normocephalic. Atraumatic.  EYES: EOM's grossly intact. Sclera anicteric.  ENT: Mucous membranes are moist. Tolerates saliva. No trismus.  NECK: Supple. No meningismus. Trachea midline.  HEART: RRR. Radial pulses 2+.  LUNGS: Respirations unlabored. CTAB  ABDOMEN: Soft.  Lower diffuse abdominal tenderness palpation without guarding or rebound.  Abdomen nondistended  EXTREMITIES: No acute deformities.  SKIN: Warm and dry.  NEUROLOGICAL: No gross facial drooping. Moves all 4 extremities spontaneously.  PSYCHIATRIC: Normal mood.          DIAGNOSTIC RESULTS   LABS:    Labs Reviewed   URINALYSIS WITH REFLEX TO CULTURE - Abnormal; Notable for the following components:       Result Value    Ketones, Urine TRACE (*)     Specific Gravity, UA <1.005 (*)     Nitrite, Urine POSITIVE (*)     Leukocyte Esterase, Urine TRACE (*)     All other components within normal limits   COMPREHENSIVE METABOLIC PANEL W/ REFLEX TO MG FOR LOW K - Abnormal; Notable for the following components:    Sodium 135 (*)     Potassium 2.8 (*)     Chloride 92 (*)     Glucose 125 (*)     Total Protein 6.3 (*)     Total Bilirubin 1.2

## 2025-07-12 ENCOUNTER — APPOINTMENT (OUTPATIENT)
Age: 89
End: 2025-07-12
Payer: MEDICARE

## 2025-07-12 ENCOUNTER — HOSPITAL ENCOUNTER (EMERGENCY)
Age: 89
Discharge: NURSING, REHAB OR LONG-TERM CARE | End: 2025-07-12
Attending: EMERGENCY MEDICINE
Payer: MEDICARE

## 2025-07-12 DIAGNOSIS — R62.7 FAILURE TO THRIVE IN ADULT: Primary | ICD-10-CM

## 2025-07-12 DIAGNOSIS — N39.0 URINARY TRACT INFECTION WITHOUT HEMATURIA, SITE UNSPECIFIED: ICD-10-CM

## 2025-07-12 LAB
ALBUMIN SERPL-MCNC: 3.9 G/DL (ref 3.4–5)
ALBUMIN/GLOB SERPL: 1.7 {RATIO}
ALP SERPL-CCNC: 74 U/L (ref 40–129)
ALT SERPL-CCNC: 9 U/L (ref 10–40)
ANION GAP SERPL CALCULATED.3IONS-SCNC: 17 MMOL/L (ref 3–16)
AST SERPL-CCNC: 22 U/L (ref 15–37)
BASOPHILS # BLD: 0.01 K/UL (ref 0–0.2)
BASOPHILS NFR BLD: 0 %
BILIRUB SERPL-MCNC: 1.5 MG/DL (ref 0–1)
BILIRUB UR QL STRIP: ABNORMAL
BUN SERPL-MCNC: 13 MG/DL (ref 7–20)
CALCIUM SERPL-MCNC: 9.4 MG/DL (ref 8.3–10.6)
CHARACTER UR: ABNORMAL
CHLORIDE SERPL-SCNC: 96 MMOL/L (ref 99–110)
CLARITY UR: CLEAR
CO2 SERPL-SCNC: 20 MMOL/L (ref 21–32)
COLOR UR: ABNORMAL
CREAT SERPL-MCNC: 0.8 MG/DL (ref 0.6–1.2)
EOSINOPHIL # BLD: 0.03 K/UL (ref 0–0.6)
EOSINOPHILS RELATIVE PERCENT: 0 %
ERYTHROCYTE [DISTWIDTH] IN BLOOD BY AUTOMATED COUNT: 12.8 % (ref 12.4–15.4)
GFR, ESTIMATED: 70 ML/MIN/1.73M2
GLUCOSE SERPL-MCNC: 111 MG/DL (ref 70–99)
GLUCOSE UR STRIP-MCNC: NEGATIVE MG/DL
HCT VFR BLD AUTO: 45.2 % (ref 36–48)
HGB BLD-MCNC: 15 G/DL (ref 12–16)
HGB UR QL STRIP.AUTO: NEGATIVE
IMM GRANULOCYTES # BLD AUTO: 0.03 K/UL (ref 0–0.5)
IMM GRANULOCYTES NFR BLD: 0 %
KETONES UR STRIP-MCNC: 15 MG/DL
LEUKOCYTE ESTERASE UR QL STRIP: NEGATIVE
LYMPHOCYTES NFR BLD: 1.2 K/UL (ref 1–5.1)
LYMPHOCYTES RELATIVE PERCENT: 17 %
MCH RBC QN AUTO: 30.2 PG (ref 26–34)
MCHC RBC AUTO-ENTMCNC: 33.2 G/DL (ref 31–36)
MCV RBC AUTO: 90.9 FL (ref 80–100)
MONOCYTES NFR BLD: 0.46 K/UL (ref 0–1.3)
MONOCYTES NFR BLD: 7 %
NEUTROPHILS NFR BLD: 75 %
NEUTS SEG NFR BLD: 5.25 K/UL (ref 1.7–7.7)
NITRITE UR QL STRIP: POSITIVE
PH UR STRIP: 6.5 [PH] (ref 5–8)
PLATELET # BLD AUTO: 200 K/UL (ref 135–450)
PMV BLD AUTO: 12 FL
POTASSIUM SERPL-SCNC: 3.9 MMOL/L (ref 3.5–5.1)
POTASSIUM SERPL-SCNC: ABNORMAL MMOL/L (ref 3.5–5.1)
PROT SERPL-MCNC: 6.3 G/DL (ref 6.4–8.2)
PROT UR STRIP-MCNC: 30 MG/DL
RBC # BLD AUTO: 4.97 M/UL (ref 4–5.2)
RBC #/AREA URNS HPF: ABNORMAL /HPF
SODIUM SERPL-SCNC: 132 MMOL/L (ref 136–145)
SP GR UR STRIP: 1.01 (ref 1–1.03)
TROPONIN I SERPL HS-MCNC: 18 NG/L (ref 0–14)
UROBILINOGEN UR STRIP-ACNC: 1 EU/DL (ref 0–1)
WBC #/AREA URNS HPF: ABNORMAL /HPF
WBC OTHER # BLD: 7 K/UL (ref 4–11)

## 2025-07-12 PROCEDURE — 81001 URINALYSIS AUTO W/SCOPE: CPT

## 2025-07-12 PROCEDURE — 84132 ASSAY OF SERUM POTASSIUM: CPT

## 2025-07-12 PROCEDURE — 80053 COMPREHEN METABOLIC PANEL: CPT

## 2025-07-12 PROCEDURE — 85025 COMPLETE CBC W/AUTO DIFF WBC: CPT

## 2025-07-12 PROCEDURE — 93005 ELECTROCARDIOGRAM TRACING: CPT | Performed by: EMERGENCY MEDICINE

## 2025-07-12 PROCEDURE — 96372 THER/PROPH/DIAG INJ SC/IM: CPT

## 2025-07-12 PROCEDURE — 99285 EMERGENCY DEPT VISIT HI MDM: CPT

## 2025-07-12 PROCEDURE — 84484 ASSAY OF TROPONIN QUANT: CPT

## 2025-07-12 PROCEDURE — 6360000002 HC RX W HCPCS: Performed by: EMERGENCY MEDICINE

## 2025-07-12 PROCEDURE — 71045 X-RAY EXAM CHEST 1 VIEW: CPT

## 2025-07-12 RX ADMIN — LIDOCAINE HYDROCHLORIDE 1000 MG: 10 INJECTION, SOLUTION EPIDURAL; INFILTRATION; INTRACAUDAL; PERINEURAL at 20:08

## 2025-07-12 ASSESSMENT — PAIN - FUNCTIONAL ASSESSMENT: PAIN_FUNCTIONAL_ASSESSMENT: NONE - DENIES PAIN

## 2025-07-12 NOTE — DISCHARGE INSTRUCTIONS
Return to emergency department if change in mental status worse in any way or any problems.    I spoke with patient, son Fer and his wife.  Patient is currently DNR comfort care and assisted living at [facility].  Family would like to proceed with hospice care at the nursing home.  While patient and son were in the emergency department I spoke with charge nurse Maribell and hospice administrator Glendy.  Patient not competent to give consent at this time.  Son will sign consent for hospice.    Charge nurse, Maribell, to please place order for hospice consult in a.m.    Patient may return to assisted living apartment at [facility] this evening.    Patient does have urinary tract infection.  She received 1 g of ceftriaxone IM while in the emergency department.  Patient is not taking anything by mouth.  I defer further treatment for UTI to PCP in conjunction with hospice.  Patient may return to the emergency department at any time for further evaluation and treatment should they change direction at any time and decide not to pursue hospice.  I discussed this with the family and they verbalized understanding.

## 2025-07-12 NOTE — ED NOTES
Dr. Tucker requesting to speak with nurse at facility. Called [facility], no answer. Left voicemail to call back when possible. Dr. Tucker aware.

## 2025-07-13 VITALS
DIASTOLIC BLOOD PRESSURE: 80 MMHG | SYSTOLIC BLOOD PRESSURE: 176 MMHG | HEIGHT: 62 IN | BODY MASS INDEX: 30.57 KG/M2 | RESPIRATION RATE: 15 BRPM | WEIGHT: 166.1 LBS | TEMPERATURE: 97.6 F | HEART RATE: 59 BPM | OXYGEN SATURATION: 100 %

## 2025-07-13 LAB
EKG ATRIAL RATE: 53 BPM
EKG DIAGNOSIS: NORMAL
EKG P AXIS: -18 DEGREES
EKG P-R INTERVAL: 168 MS
EKG Q-T INTERVAL: 468 MS
EKG QRS DURATION: 78 MS
EKG QTC CALCULATION (BAZETT): 439 MS
EKG R AXIS: -44 DEGREES
EKG T AXIS: 27 DEGREES
EKG VENTRICULAR RATE: 53 BPM

## 2025-07-13 PROCEDURE — 93010 ELECTROCARDIOGRAM REPORT: CPT | Performed by: INTERNAL MEDICINE

## 2025-07-13 NOTE — ED PROVIDER NOTES
returns to nursing home.  Patient will return to her apartment in assisted living at [facility].  Maribell assures me that although patient is unable to care for herself at this time she will have all assistance that she needs around-the-clock until hospice care is initiated.  Patent's son was witness to conversation with Maribell and Glendy which were both on speaker when his conversation ensued.    Prior to transfer back to nursing home urinalysis was resulted positive nitrites 0-2 WBCs positive ketones.  Urine culture was obtained.  Patient received empiric antibiotic therapy with ceftriaxone 1000 mg IM prior to discharge.  Further treatment for urinary tract infection has been deferred to nursing home doctor in conjunction with hospice care.  Urine culture pending at the time of transfer.  Son and daughter-in-law are also aware of this.    Patient felt to be safe for discharge back to nursing home.       Patient was given the following medications:   Medications   cefTRIAXone (ROCEPHIN) 1,000 mg in lidocaine 1 % 2.86 mL IM Injection (1,000 mg IntraMUSCular Given 7/12/25 2008)        CONSULTS:   None   Discussion with Other Professionals: None   {Social Determinants: Assisted living [facility]  Chronic Conditions: Hypertension hyperlipidemia lumbar stenosis depression history of breast cancer history of basal cell carcinoma  Records Reviewed: Outpatient records from 7/2/2025 9/16/2024 5/20/2024      Disposition Considerations:    I am the Primary Clinician of Record.        FINAL IMPRESSION    1. Failure to thrive in adult    2. Urinary tract infection without hematuria, site unspecified           DISPOSITION/PLAN:   Pt discharged home in stable condition.     PATIENT REFERRED TO:   Svetlana Barraza MD  [facility] [place] [address]  [phone]    Follow up         DISCHARGE MEDICATIONS:   Discharge Medication List as of 7/12/2025  8:16 PM           DISCONTINUED MEDICATIONS: